# Patient Record
Sex: FEMALE | Race: WHITE | NOT HISPANIC OR LATINO | Employment: FULL TIME | URBAN - METROPOLITAN AREA
[De-identification: names, ages, dates, MRNs, and addresses within clinical notes are randomized per-mention and may not be internally consistent; named-entity substitution may affect disease eponyms.]

---

## 2017-02-15 ENCOUNTER — TELEPHONE (OUTPATIENT)
Dept: OBSTETRICS AND GYNECOLOGY | Facility: CLINIC | Age: 22
End: 2017-02-15

## 2017-02-15 NOTE — TELEPHONE ENCOUNTER
----- Message from Alycia Cervantes sent at 2/15/2017 10:04 AM CST -----  Patient states she would liked to be worked in for an appt on today for pregnancy testing. Patient was advised of availability. Please adv/call [phone].//thanks. cw

## 2017-02-21 ENCOUNTER — TELEPHONE (OUTPATIENT)
Dept: OBSTETRICS AND GYNECOLOGY | Facility: CLINIC | Age: 22
End: 2017-02-21

## 2017-02-21 NOTE — TELEPHONE ENCOUNTER
----- Message from Mason Cervantes sent at 2/21/2017  1:51 PM CST -----  Contact: Pt   Pt is returning a missed call./ States she needs to know can she have her an ultrasound when she comes in for her office visit./ She can be reached at [phone]

## 2017-02-21 NOTE — TELEPHONE ENCOUNTER
----- Message from Ragini Naranjo sent at 2/21/2017  1:03 PM CST -----  Contact: Patient  Patient is asking if she is going to have an ultrasound done on her first appointment, please call her back at [phone]. Thank you

## 2017-02-21 NOTE — TELEPHONE ENCOUNTER
Spoke with patient, she is scheduled for a Risk Assessment.  Patient informed that the sonogram is usually done at the Initial OB appointment.  Patient voices understanding.

## 2017-03-07 ENCOUNTER — OFFICE VISIT (OUTPATIENT)
Dept: OBSTETRICS AND GYNECOLOGY | Facility: CLINIC | Age: 22
End: 2017-03-07
Payer: MEDICAID

## 2017-03-07 ENCOUNTER — PROCEDURE VISIT (OUTPATIENT)
Dept: OBSTETRICS AND GYNECOLOGY | Facility: CLINIC | Age: 22
End: 2017-03-07
Payer: MEDICAID

## 2017-03-07 VITALS
BODY MASS INDEX: 24.06 KG/M2 | HEIGHT: 63 IN | SYSTOLIC BLOOD PRESSURE: 121 MMHG | DIASTOLIC BLOOD PRESSURE: 70 MMHG | WEIGHT: 135.81 LBS

## 2017-03-07 DIAGNOSIS — Z32.01 PREGNANCY EXAMINATION OR TEST, POSITIVE RESULT: Primary | ICD-10-CM

## 2017-03-07 DIAGNOSIS — Z32.01 PREGNANCY EXAMINATION OR TEST, POSITIVE RESULT: ICD-10-CM

## 2017-03-07 PROCEDURE — 76801 OB US < 14 WKS SINGLE FETUS: CPT | Mod: PBBFAC | Performed by: OBSTETRICS & GYNECOLOGY

## 2017-03-07 PROCEDURE — 76801 OB US < 14 WKS SINGLE FETUS: CPT | Mod: 26,S$PBB,, | Performed by: OBSTETRICS & GYNECOLOGY

## 2017-03-07 PROCEDURE — 99213 OFFICE O/P EST LOW 20 MIN: CPT | Mod: S$PBB,,, | Performed by: OBSTETRICS & GYNECOLOGY

## 2017-03-07 PROCEDURE — 99999 PR PBB SHADOW E&M-EST. PATIENT-LVL III: CPT | Mod: PBBFAC,,, | Performed by: OBSTETRICS & GYNECOLOGY

## 2017-03-07 NOTE — MR AVS SNAPSHOT
"    [facility]  [address] [facility] [address]  [place] [address]  Phone: [phone]  Fax: [phone]                  April Pearl Khoury   3/7/2017 5:15 PM   Office Visit    Description:  Female : 1995   Provider:  ROSHAN Whalen MD   Department:  [facility]           Reason for Visit     Possible Pregnancy           Diagnoses this Visit        Comments    Pregnancy examination or test, positive result    -  Primary            To Do List           Future Appointments        Provider Department Dept Phone    3/28/2017 1:00 PM Danielle Arguello CNM [facility]/ -[phone]      Goals (5 Years of Data)     None      [facility]     [facility] Line -  Assistance  Registered nurses in the [facility] On Call Center provide clinical advisement, health education, appointment booking, and other advisory services.  Call for this free service at [phone].             Medications           Message regarding Medications     Verify the changes and/or additions to your medication regime listed below are the same as discussed with your clinician today.  If any of these changes or additions are incorrect, please notify your healthcare provider.             Verify that the below list of medications is an accurate representation of the medications you are currently taking.  If none reported, the list may be blank. If incorrect, please contact your healthcare provider. Carry this list with you in case of emergency.           Current Medications     fluticasone (FLONASE) 50 mcg/actuation nasal spray 2 sprays by Each Nare route 2 (two) times daily as needed for Rhinitis.           Clinical Reference Information           Your Vitals Were     BP Height Weight Last Period BMI    121/70 5' 3" (1.6 m) 61.6 kg (135 lb 12.9 oz) 2017 24.06 kg/m2      Blood Pressure          Most Recent Value    BP  121/70      Allergies as of 3/7/2017     No Known Allergies      Immunizations " Administered on Date of Encounter - 3/7/2017     None      Orders Placed During Today's Visit     Future Labs/Procedures Expected by Expires    US OB/GYN Procedure ([facility])  As directed 3/7/2018      Smoking Cessation     If you would like to quit smoking:   You may be eligible for free services if you are a [place] resident and started smoking cigarettes before September 1, 1988.  Call the Smoking Cessation Trust ([facility]) toll free at (240) 053-9057 or (623) 926-3768.   Call 1-800-QUIT-NOW if you do not meet the above criteria.            Language Assistance Services     ATTENTION: Language assistance services are available, free of charge. Please call [phone].      ATENCIÓN: Si habla español, tiene a garcía disposición servicios gratuitos de asistencia lingüística. Llame al [phone].     CHÚ Ý: N?u b?n nói Ti?ng Vi?t, có các d?ch v? h? tr? ngôn ng? mi?n phí dành cho b?n. G?i s? [phone].         [facility] complies with applicable Federal civil rights laws and does not discriminate on the basis of race, color, national origin, age, disability, or sex.

## 2017-03-08 NOTE — PROGRESS NOTES
Subjective:       Patient ID: April Pearl Khoury is a 21 y.o. female.    Chief Complaint:  Possible Pregnancy      History of Present Illness  HPI  Presents today for risk assessment for possible pregnancy.  UPT is positive in office.   Medical history reviewed with patient.   Risk evaluation discussed with patient  Reviewed plan of care and [facility] practice style.   Low risk  Followed by CNM with last pregnancy     GYN & OB History  Patient's last menstrual period was 2017.   Date of Last Pap: 2015    OB History    Para Term  AB SAB TAB Ectopic Multiple Living   1 1 1       1      # Outcome Date GA Lbr Naseem/2nd Weight Sex Delivery Anes PTL Lv   1 Term 14 40w0d  3.43 kg (7 lb 9 oz) M Vag-Spont   Y          Review of Systems  Review of Systems   Constitutional: Negative for appetite change, chills, fatigue, fever and unexpected weight change.   HENT: Negative.    Eyes: Negative for visual disturbance.   Respiratory: Negative for shortness of breath and wheezing.    Cardiovascular: Negative for chest pain, palpitations and leg swelling.   Gastrointestinal: Negative for abdominal pain, bloating, blood in stool, constipation, diarrhea, nausea and vomiting.   Endocrine: Negative for hair loss, hot flashes and hypothyroidism.   Genitourinary: Negative for dyspareunia, dysuria, flank pain, frequency, genital sores, hematuria, pelvic pain, urgency, vaginal bleeding, vaginal discharge, dysmenorrhea, urinary incontinence and vaginal odor.   Musculoskeletal: Negative for back pain, joint swelling and myalgias.   Skin:  Negative for rash and hair changes.   Neurological: Negative for syncope and headaches.   Hematological: Negative for adenopathy. Does not bruise/bleed easily.   Psychiatric/Behavioral: Negative for depression and sleep disturbance. The patient is not nervous/anxious.    Breast: Negative for breast mass, breast pain, nipple discharge and skin changes         Ultrasound:  IUP 7w 3 d with positive FHR, bobby.     Objective:    Physical Exam       Assessment:        1. Pregnancy examination or test, positive result                Plan:      New Ob appt in 3 weeks

## 2017-03-28 ENCOUNTER — INITIAL PRENATAL (OUTPATIENT)
Dept: OBSTETRICS AND GYNECOLOGY | Facility: CLINIC | Age: 22
End: 2017-03-28
Payer: MEDICAID

## 2017-03-28 ENCOUNTER — LAB VISIT (OUTPATIENT)
Dept: LAB | Facility: HOSPITAL | Age: 22
End: 2017-03-28
Attending: ADVANCED PRACTICE MIDWIFE
Payer: MEDICAID

## 2017-03-28 VITALS
WEIGHT: 137.56 LBS | BODY MASS INDEX: 24.37 KG/M2 | SYSTOLIC BLOOD PRESSURE: 106 MMHG | DIASTOLIC BLOOD PRESSURE: 72 MMHG

## 2017-03-28 DIAGNOSIS — Z34.80 SUPERVISION OF OTHER NORMAL PREGNANCY: Primary | ICD-10-CM

## 2017-03-28 DIAGNOSIS — Z34.80 SUPERVISION OF OTHER NORMAL PREGNANCY: ICD-10-CM

## 2017-03-28 LAB
BASOPHILS # BLD AUTO: 0.02 K/UL
BASOPHILS NFR BLD: 0.2 %
DIFFERENTIAL METHOD: ABNORMAL
EOSINOPHIL # BLD AUTO: 0.1 K/UL
EOSINOPHIL NFR BLD: 0.6 %
ERYTHROCYTE [DISTWIDTH] IN BLOOD BY AUTOMATED COUNT: 12.9 %
HCT VFR BLD AUTO: 34.9 %
HGB BLD-MCNC: 12 G/DL
LYMPHOCYTES # BLD AUTO: 2.1 K/UL
LYMPHOCYTES NFR BLD: 24.2 %
MCH RBC QN AUTO: 30.1 PG
MCHC RBC AUTO-ENTMCNC: 34.4 %
MCV RBC AUTO: 88 FL
MONOCYTES # BLD AUTO: 0.7 K/UL
MONOCYTES NFR BLD: 7.9 %
NEUTROPHILS # BLD AUTO: 5.7 K/UL
NEUTROPHILS NFR BLD: 66.9 %
PLATELET # BLD AUTO: 262 K/UL
PMV BLD AUTO: 10.3 FL
RBC # BLD AUTO: 3.99 M/UL
WBC # BLD AUTO: 8.46 K/UL

## 2017-03-28 PROCEDURE — 99999 PR PBB SHADOW E&M-EST. PATIENT-LVL II: CPT | Mod: PBBFAC,,, | Performed by: ADVANCED PRACTICE MIDWIFE

## 2017-03-28 PROCEDURE — 36415 COLL VENOUS BLD VENIPUNCTURE: CPT

## 2017-03-28 PROCEDURE — 86703 HIV-1/HIV-2 1 RESULT ANTBDY: CPT

## 2017-03-28 PROCEDURE — 86592 SYPHILIS TEST NON-TREP QUAL: CPT

## 2017-03-28 PROCEDURE — 85025 COMPLETE CBC W/AUTO DIFF WBC: CPT

## 2017-03-28 PROCEDURE — 86901 BLOOD TYPING SEROLOGIC RH(D): CPT

## 2017-03-28 PROCEDURE — 99203 OFFICE O/P NEW LOW 30 MIN: CPT | Mod: TH,S$PBB,, | Performed by: ADVANCED PRACTICE MIDWIFE

## 2017-03-28 PROCEDURE — 87340 HEPATITIS B SURFACE AG IA: CPT

## 2017-03-28 PROCEDURE — 86762 RUBELLA ANTIBODY: CPT

## 2017-03-28 PROCEDURE — 86900 BLOOD TYPING SEROLOGIC ABO: CPT

## 2017-03-28 NOTE — MR AVS SNAPSHOT
[facility]  [address] [facility]  [place] [address]  Phone: [phone]  Fax: [phone]                  April Pearl Khoury   3/28/2017 1:00 PM   Initial Prenatal    Description:  Female : 1995   Provider:  Danielle Arguello CNM   Department:  [facility]           Reason for Visit     Initial Prenatal Visit           Diagnoses this Visit        Comments    Supervision of other normal pregnancy    -  Primary            To Do List           Goals (5 Years of Data)     None      [facility]     [facility] Call Nurse Care Line -  Assistance  Registered nurses in the [facility] On Call Center provide clinical advisement, health education, appointment booking, and other advisory services.  Call for this free service at [phone].             Medications           Message regarding Medications     Verify the changes and/or additions to your medication regime listed below are the same as discussed with your clinician today.  If any of these changes or additions are incorrect, please notify your healthcare provider.             Verify that the below list of medications is an accurate representation of the medications you are currently taking.  If none reported, the list may be blank. If incorrect, please contact your healthcare provider. Carry this list with you in case of emergency.           Current Medications     fluticasone (FLONASE) 50 mcg/actuation nasal spray 2 sprays by Each Nare route 2 (two) times daily as needed for Rhinitis.           Clinical Reference Information           Prenatal Vitals     Enc. Date GA Prenatal Vitals Prenatal Pulse Pain Level Urine Albumin/Glucose Edema Presentation Dilation/Effacement/Station    3/28/17 10w3d 106/72 / 62.4 kg (137 lb 9.1 oz)  / 162 / Absent  0          Your Vitals Were     BP Weight Last Period BMI       106/72 62.4 kg (137 lb 9.1 oz) 2017 24.37 kg/m2       Allergies as of 3/28/2017     No Known Allergies       Immunizations Administered on Date of Encounter - 3/28/2017     None      Orders Placed During Today's Visit      Normal Orders This Visit    C. trachomatis/N. gonorrhoeae by AMP DNA Vagina     Future Labs/Procedures Expected by Expires    CBC auto differential  3/28/2017 3/28/2018    Hepatitis B surface antigen  3/28/2017 3/28/2018    HIV-1 and HIV-2 antibodies  3/28/2017 3/28/2018    RPR  3/28/2017 3/28/2018    Rubella antibody, IgG  3/28/2017 3/28/2018    Type & Screen - Ob Profile  3/28/2017 3/28/2018      Smoking Cessation     If you would like to quit smoking:   You may be eligible for free services if you are a [place] resident and started smoking cigarettes before September 1, 1988.  Call the Smoking Cessation Trust ([employer]) toll free at (074) 068-4612 or (520) 086-4069.   Call 7-795-QUIT-NOW if you do not meet the above criteria.            Language Assistance Services     ATTENTION: Language assistance services are available, free of charge. Please call [phone].      ATENCIÓN: Si habla español, tiene a garcía disposición servicios gratuitos de asistencia lingüística. Llame al [phone].     CHÚ Ý: N?u b?n nói Ti?ng Vi?t, có các d?ch v? h? tr? ngôn ng? mi?n phí dành cho b?n. G?i s? [phone].         [facility] complies with applicable Federal civil rights laws and does not discriminate on the basis of race, color, national origin, age, disability, or sex.

## 2017-03-28 NOTE — PROGRESS NOTES
Familiar with our practice, delivered last child here. Reoriented, blue bag info reviewed. No [place] books in office, will provide next OV. Consent signed. No c/o nausea. Discussed zika and dietary recommendations. Labs today, culture sent, wet prep + BV exp will treat after 14 wks. al

## 2017-03-29 LAB
ABO + RH BLD: NORMAL
BLD GP AB SCN CELLS X3 SERPL QL: NORMAL
C TRACH DNA SPEC QL NAA+PROBE: NOT DETECTED
HBV SURFACE AG SERPL QL IA: NEGATIVE
HIV 1+2 AB+HIV1 P24 AG SERPL QL IA: NEGATIVE
N GONORRHOEA DNA SPEC QL NAA+PROBE: NOT DETECTED
RPR SER QL: NORMAL
RUBV IGG SER-ACNC: 30.4 IU/ML
RUBV IGG SER-IMP: REACTIVE

## 2017-04-05 ENCOUNTER — TELEPHONE (OUTPATIENT)
Dept: OBSTETRICS AND GYNECOLOGY | Facility: CLINIC | Age: 22
End: 2017-04-05

## 2017-04-05 ENCOUNTER — HOSPITAL ENCOUNTER (EMERGENCY)
Facility: HOSPITAL | Age: 22
Discharge: HOME | End: 2017-04-05
Payer: MEDICAID

## 2017-04-05 VITALS
HEART RATE: 101 BPM | BODY MASS INDEX: 24.1 KG/M2 | SYSTOLIC BLOOD PRESSURE: 143 MMHG | DIASTOLIC BLOOD PRESSURE: 66 MMHG | RESPIRATION RATE: 16 BRPM | OXYGEN SATURATION: 97 % | TEMPERATURE: 98 F | WEIGHT: 136 LBS | HEIGHT: 63 IN

## 2017-04-05 DIAGNOSIS — N39.0 URINARY TRACT INFECTION WITHOUT HEMATURIA, SITE UNSPECIFIED: ICD-10-CM

## 2017-04-05 DIAGNOSIS — O21.9 NAUSEA/VOMITING IN PREGNANCY: Primary | ICD-10-CM

## 2017-04-05 DIAGNOSIS — R03.0 ELEVATED BLOOD PRESSURE READING: ICD-10-CM

## 2017-04-05 DIAGNOSIS — O26.891 HEADACHE IN PREGNANCY, FIRST TRIMESTER: ICD-10-CM

## 2017-04-05 DIAGNOSIS — O12.11 GESTATIONAL PROTEINURIA IN FIRST TRIMESTER: ICD-10-CM

## 2017-04-05 DIAGNOSIS — R51.9 HEADACHE IN PREGNANCY, FIRST TRIMESTER: ICD-10-CM

## 2017-04-05 LAB
BACTERIA #/AREA URNS HPF: ABNORMAL /HPF
BILIRUB UR QL STRIP: NEGATIVE
CLARITY UR: CLEAR
COLOR UR: YELLOW
GLUCOSE UR QL STRIP: NEGATIVE
HGB UR QL STRIP: ABNORMAL
HYALINE CASTS #/AREA URNS LPF: 0 /LPF
KETONES UR QL STRIP: NEGATIVE
LEUKOCYTE ESTERASE UR QL STRIP: ABNORMAL
MICROSCOPIC COMMENT: ABNORMAL
NITRITE UR QL STRIP: POSITIVE
PH UR STRIP: 7 [PH] (ref 5–8)
PROT UR QL STRIP: ABNORMAL
RBC #/AREA URNS HPF: 5 /HPF (ref 0–4)
SP GR UR STRIP: 1.02 (ref 1–1.03)
URN SPEC COLLECT METH UR: ABNORMAL
UROBILINOGEN UR STRIP-ACNC: 1 EU/DL
WBC #/AREA URNS HPF: 50 /HPF (ref 0–5)
WBC CLUMPS URNS QL MICRO: ABNORMAL

## 2017-04-05 PROCEDURE — 81000 URINALYSIS NONAUTO W/SCOPE: CPT

## 2017-04-05 PROCEDURE — 25000003 PHARM REV CODE 250: Performed by: PHYSICIAN ASSISTANT

## 2017-04-05 PROCEDURE — 99284 EMERGENCY DEPT VISIT MOD MDM: CPT

## 2017-04-05 RX ORDER — PROMETHAZINE HYDROCHLORIDE 25 MG/1
12.5 TABLET ORAL EVERY 6 HOURS PRN
Qty: 15 TABLET | Refills: 0 | Status: SHIPPED | OUTPATIENT
Start: 2017-04-05 | End: 2019-02-21

## 2017-04-05 RX ORDER — ACETAMINOPHEN 500 MG
1000 TABLET ORAL
Status: COMPLETED | OUTPATIENT
Start: 2017-04-05 | End: 2017-04-05

## 2017-04-05 RX ORDER — NITROFURANTOIN 25; 75 MG/1; MG/1
100 CAPSULE ORAL 2 TIMES DAILY
Qty: 14 CAPSULE | Refills: 0 | Status: SHIPPED | OUTPATIENT
Start: 2017-04-05 | End: 2017-04-09

## 2017-04-05 RX ORDER — NITROFURANTOIN 25; 75 MG/1; MG/1
100 CAPSULE ORAL
Status: COMPLETED | OUTPATIENT
Start: 2017-04-05 | End: 2017-04-05

## 2017-04-05 RX ADMIN — ACETAMINOPHEN 1000 MG: 500 TABLET ORAL at 03:04

## 2017-04-05 RX ADMIN — NITROFURANTOIN MONOHYDRATE/MACROCRYSTALLINE 100 MG: 25; 75 CAPSULE ORAL at 04:04

## 2017-04-05 NOTE — TELEPHONE ENCOUNTER
----- Message from Malika Fernandez sent at 4/5/2017  3:31 PM CDT -----  Contact: pt   Pt called this morning and spoke with nurse and explained hwo she was feeling and was told to go to ER because it could be dehydration,, pt is at ER now and they are telling her that there is nothing wrong with her because she can uriinate and drink,, and really want to know what to tell them to test her,,, please call pt back at [phone]

## 2017-04-05 NOTE — DISCHARGE INSTRUCTIONS
"  Self-Care for Headaches  Most headaches aren't serious and can be relieved with self-care. But some headaches may be a sign of another health problem like eye trouble or high blood pressure. To find the best treatment, learn what kind of headaches you get. For tension headaches, self-care will usually help. To treat migraines, ask your healthcare provider for advice. It is also possible to get both tension and migraine headaches. Self-care involves relieving the pain and avoiding headache triggers if you can.    Ways to reduce pain and tension  Try these steps:  · Apply a cold compress or ice pack to the pain site.  · Drink fluids. If nausea makes it hard to drink, try sucking on ice.  · Rest. Protect yourself from bright light and loud noises.  · Calm your emotions by imagining a peaceful scene.  · Massage tight neck, shoulder, and head muscles.  · To relax muscles, soak in a hot bath or use a hot shower.  Use medicines  Aspirin or aspirin substitutes, such as ibuprofen and acetaminophen, can relieve headache. Remember: Never give aspirin to anyone 18 years old or younger because of the risk of developing Reye syndrome. Use pain medicines only when necessary.  Track your headaches  Keeping a headache diary can help you and your healthcare provider identify what's causing your headaches:  · Note when each headache happens.  · Identify your activities and the foods you've eaten 6 to 8 hours before the headache began.  · Look for any trends or "triggers."  Signs of tension headache  Any of the following can be signs:  · Dull pain or feeling of pressure in a tight band around your head  · Pain in your neck or shoulders  · Headache without a definite beginning or end  · Headache after an activity such as driving or working on a computer  Signs of migraine  Any of the following can be signs:  · Throbbing pain on one or both sides of your head  · Nausea or vomiting  · Extreme sensitivity to light, sound, and " "smells  · Bright spots, flashes, or other visual changes  · Pain or nausea so severe that you can't continue your daily activities  Call your healthcare provider   If you have any of the following symptoms, contact your healthcare provider:  · A headache that lingers after a recent injury or bump to the head.  · A fever with a stiff neck or pain when you bend your head toward your chest.  · A headache along with slurred speech, changes in your vision, or numbness or weakness in your arms or legs.  · A headache for longer than 3 days.  · Frequent headaches, especially in the morning.  · Headaches with seizures   · Seek immediate medical attention if you have a headache that you would call "the worst headache you have ever had."   Date Last Reviewed: 10/4/2015  © 5391-9593 [employer]. [address] [place] [address], [place] [address]. All rights reserved. This information is not intended as a substitute for professional medical care. Always follow your healthcare professional's instructions.          How to Control Nausea and Vomiting     Taken before meals, medicines can help ease nausea.    Nausea is feeling that you need to throw up. Throwing up occurs when your body forces food that is in your stomach out through your mouth. Nausea and vomiting are symptoms that are caused by many things. They can happen when a condition or disease, medicine, medical treatment, or a poisonous substance affects the area in your brain that controls vomiting. Some conditions or diseases can cause nausea, abdominal pain or cramps, and vomiting. The symptoms can be mild and go away by themselves. Other symptoms can be serious. You will need to see your healthcare provider for these.  Nausea and vomiting are common. They can be caused by many things. These include:  · "Stomach flu" (gastroenteritis)  · Food poisoning  · Stomach pain (gastritis)  · Blockages  They can also be caused by a head injury, an infection in the brain or " inside the ear, or migraines. Other common causes of nausea and vomiting include:  · Brain tumor  · Brain bruise  · Motion sickness  · Drugs. These include alcohol, pain medicines such as morphine, and cancer medicines.  · Toxins. These are poisonous things like plants or liquids that are swallowed by accident.  · Advanced types of cancer  · Movement problems (psychogenic problems)  · Extra pressure in the fluid that surrounds the brain and spinal cord (elevated intracranial pressure)     Nausea and vomiting are also common side effects of chemotherapy and radiation therapy. Side effects happen when treatment changes some normal cells as well as cancer cells. In this case, the cells lining your stomach and the part of your brain that controls vomiting are affected. Other more serious causes of vomiting may be hard to find early in the illness.     When to seek medical advice  Call your healthcare provider right away if you have the following:  · Nausea or vomiting that lasts 24 hours or more  · Trouble keeping fluids down   Medicines can help  Nausea or vomiting can often be prevented or controlled with medicines (antiemetics). Your doctor may give you antiemetics before or after treatment if you are getting chemotherapy or other medical treatments that cause nausea or vomiting.  Eating tips  · If you have medicines to control nausea, take them before meals as directed.  · Avoid fatty or greasy foods while nauseated.  · Eat small meals slowly throughout the day.  · Ask someone to sit with you while you eat to keep you from thinking about feeling nauseated.  · Eat foods at room temperature or colder to avoid strong smells.  · Eat dry foods, such as toast, crackers, or pretzels. Also eat cool, light foods, such as applesauce, and bland foods, such as oatmeal or skinned chicken.   Other ways to feel better  · Get a little fresh air. Take a short walk.  · Talk to a friend, listen to music, or watch TV.  · Take a few  deep, slow breaths.  · Eat by candlelight or in surroundings that you find relaxing.  · Use a technique, such as guided imagery, to help you relax. Imagine yourself in a beautiful, restful scene. Or daydream about the place youd most like to be.  Date Last Reviewed: 1/6/2016 © 2000-2016 [employer]. [address] [place] [address], [place] [address]. All rights reserved. This information is not intended as a substitute for professional medical care. Always follow your healthcare professional's instructions.          Protein in the Urine (Proteinuria)  The kidney filters waste products and unwanted substances from the blood. These waste products end up in the urine. Protein is an important part of the blood and is not filtered out. Normally, there is no protein in the urine.  Proteinuria occurs when some of the normal protein in the bloodstream ends up in the urine. Protein in the urine will show up on a standard urine test.   Protein in the urine can be a sign of serious disease or a harmless temporary condition. For example, heavy exercise or fever can cause temporary proteinuria. This is normal and will go away if the urine test is repeated.  If urine tests continue to show protein in the urine, chronic kidney disease may be present. Common causes of kidney disease include diabetes, high blood pressure, and conditions that cause inflammation in the kidneys.  Protein in the blood helps keep fluids from leaking out of the blood vessels and into the surrounding tissues. Mild or temporary proteinuria doesn't cause any symptoms. However, if too much protein is lost from the body, there may be swelling as fluid leaks from the blood vessels. Swelling may appear in legs, feet, and ankles or elsewhere such as lower back, face and eyelids.  If proteinuria persists on repeat urine testing, more tests will be needed to figure out the cause. If the cause is still unclear, you may be told to see a kidney  specialist.  Home care  No special home care is needed until the cause of your proteinuria is known.  Follow-up care  Follow up with your doctor, or as advised.  Call 911   Call 911 or get immediate medical care if any of the following occur:   · Severe weakness, dizziness, fainting, drowsiness, or confusion  · Chest pain or shortness of breath   When to seek medical advice  Call your healthcare provider right away if any of these occur:   · Nausea or vomiting  · Unexpected weight gain or swelling in the legs, ankles, or around the eyes  · Dark colored urine  · Decreased or absent urine output  Date Last Reviewed: 6/1/2016 © 2000-2016 [employer]. [address] [place] [address], [place] [address]. All rights reserved. This information is not intended as a substitute for professional medical care. Always follow your healthcare professional's instructions.          Urinary Tract Infections in Women    Urinary tract infections (UTIs) are most often caused by bacteria (germs). These bacteria enter the urinary tract. The bacteria may come from outside the body. Or they may travel from the skin outside the rectum or vagina into the urethra. Female anatomy makes it easier for bacteria from the bowel to enter a womans urinary tract, which is the most common source of UTI. This means women develop UTIs more often than men. Pain in or around the urinary tract is a common UTI symptom. But the only way to know for sure if you have a UTI for the health care provider to test your urine. The two tests that may be done are the urinalysis and urine culture.  Types of UTIs  · Cystitis: A bladder infection (cystitis) is the most common UTI in women. You may have urgent or frequent urination. You may also have pain, burning when you urinate, and bloody urine.  · Urethritis: This is an inflamed urethra, which is the tube that carries urine from the bladder to outside the body. You may have lower stomach or back pain. You may  also have urgent or frequent urination.  · Pyelonephritis: This is a kidney infection. If not treated, it can be serious and damage your kidneys. In severe cases, you may be hospitalized. You may have a fever and lower back pain.  Medications to treat a UTI  Most UTIs are treated with antibiotics. These kill the bacteria. The length of time you need to take them depends on the type of infection. It may be as short as 3 days. If you have repeated UTIs, a low-dose antibiotic may be needed for several months. Take antibiotics exactly as directed. Dont stop taking them until all of the medication is gone. If you stop taking the antibiotic too soon, the infection may not go away, and you may develop a resistance to the antibiotic. This can make it much harder to treat.  Lifestyle changes to treat and prevent UTIs  The lifestyle changes below will help get rid of your UTI. They may also help prevent future UTIs.  · Drink plenty of fluids. This includes water, juice, or other caffeine-free drinks. Fluids help flush bacteria out of your body.  · Empty your bladder. Always empty your bladder when you feel the urge to urinate. And always urinate before going to sleep. Urine that stays in your bladder can lead to infection. Try to urinate before and after sex as well.  · Practice good personal hygiene. Wipe yourself from front to back after using the toilet. This helps keep bacteria from getting into the urethra.  · Use condoms during sex. These help prevent UTIs caused by sexually transmitted bacteria. Also, avoid using spermicides during sex. These can increase the risk of UTIs. Choose other forms of birth control instead. For women who tend to get UTIs after sex, a low-dose of a preventive antibiotic may be used. Be sure to discuss this option with your health care provider.  · Follow up with your health care provider as directed. He or she may test to make sure the infection has cleared. If necessary, additional treatment  may be started.  Date Last Reviewed: 9/8/2014  © 6461-1551 [employer]. [address] [place] [address], [place] [address]. All rights reserved. This information is not intended as a substitute for professional medical care. Always follow your healthcare professional's instructions.

## 2017-04-05 NOTE — TELEPHONE ENCOUNTER
Spoke with patient, she is currently being treated @ [facility] ED.  Patient expressed concerns that her complaints were not being addressed.  She was advised to follow the orders of the ED physician, and that we would not do anything different in the hospital.  Tylenol 1gram has been advised Q6-8 hours prn for headaches.

## 2017-04-05 NOTE — ED AVS SNAPSHOT
[facility]  [id number] [place] [id number]               April Pearl Khoury   2017  2:38 PM   ED    Description:  Female : 1995   Department:  [facility]           Your Care was Coordinated By:     Provider Role From To    Daniel Blanchard PA-C Physician Assistant 17 [phone] --      Reason for Visit     Emesis During Pregnancy           Diagnoses this Visit        Comments    Nausea/vomiting in pregnancy    -  Primary     Headache in pregnancy, first trimester         Elevated blood pressure reading         Gestational proteinuria in first trimester         Urinary tract infection without hematuria, site unspecified           ED Disposition     ED Disposition Condition Comment    Discharge             To Do List           Follow-up Information     Schedule an appointment as soon as possible for a visit with [facility] - OB/ [facility]YN.    Specialty:  Obstetrics and Gynecology    Why:  Follow up with [facility] in the next 2-3 days for re-evaluation and further management.     Contact information:    [address] [facility] [address] [phone]    Additional information:    (off [facility]) 4th floor, Please check in for your appointment in the Women's Services Department located through the doorway to the left of the elevators.        Follow up with [facility].    Specialty:  Emergency Medicine    Why:  If symptoms worsen in any way.     Contact information:    [address] [place] [id number] [phone]       These Medications        Disp Refills Start End    nitrofurantoin, macrocrystal-monohydrate, (MACROBID) 100 MG capsule 14 capsule 0 2017    Take 1 capsule (100 mg total) by mouth 2 (two) times daily. - Oral    promethazine (PHENERGAN) 25 MG tablet 15 tablet 0 2017     Take 0.5 tablets (12.5 mg total) by mouth every 6 (six) hours as  needed for Nausea. - Oral      [facility]     [facility] On Call Nurse Care Line - 24/7 Assistance  Unless otherwise directed by your provider, please contact [facility], our nurse care line that is available for 24/7 assistance.     Registered nurses in the [facility] On Call Center provide: appointment scheduling, clinical advisement, health education, and other advisory services.  Call: [phone] (toll free)               Medications           Message regarding Medications     Verify the changes and/or additions to your medication regime listed below are the same as discussed with your clinician today.  If any of these changes or additions are incorrect, please notify your healthcare provider.        START taking these NEW medications        Refills    nitrofurantoin, macrocrystal-monohydrate, (MACROBID) 100 MG capsule 0    Sig: Take 1 capsule (100 mg total) by mouth 2 (two) times daily.    Class: Print    Route: Oral    promethazine (PHENERGAN) 25 MG tablet 0    Sig: Take 0.5 tablets (12.5 mg total) by mouth every 6 (six) hours as needed for Nausea.    Class: Print    Route: Oral      These medications were administered today        Dose Freq    acetaminophen tablet 1,000 mg 1,000 mg ED 1 Time    Sig: Take 2 tablets (1,000 mg total) by mouth ED 1 Time.    Class: Normal    Route: Oral    Cosign for Ordering: Accepted by Lis Mcgraw MD on 4/5/2017  3:43 PM    nitrofurantoin (macrocrystal-monohydrate) 100 MG capsule 100 mg 100 mg ED 1 Time    Sig: Take 1 capsule (100 mg total) by mouth ED 1 Time.    Class: Normal    Route: Oral    Cosign for Ordering: Accepted by Lis Mcgraw MD on 4/5/2017  4:11 PM      STOP taking these medications     fluticasone (FLONASE) 50 mcg/actuation nasal spray 2 sprays by Each Nare route 2 (two) times daily as needed for Rhinitis.           Verify that the below list of medications is an accurate representation of the medications you are currently taking.  If  "none reported, the list may be blank. If incorrect, please contact your healthcare provider. Carry this list with you in case of emergency.           Current Medications     nitrofurantoin (macrocrystal-monohydrate) 100 MG capsule 100 mg Take 1 capsule (100 mg total) by mouth ED 1 Time.    nitrofurantoin, macrocrystal-monohydrate, (MACROBID) 100 MG capsule Take 1 capsule (100 mg total) by mouth 2 (two) times daily.    promethazine (PHENERGAN) 25 MG tablet Take 0.5 tablets (12.5 mg total) by mouth every 6 (six) hours as needed for Nausea.           Clinical Reference Information           Your Vitals Were     BP Pulse Temp Resp Height Weight    143/66 (BP Location: Right arm, Patient Position: Sitting) 101 98.4 °F (36.9 °C) 16 5' 3" (1.6 m) 61.7 kg (136 lb)    Last Period SpO2 BMI          01/14/2017 97% 24.09 kg/m2        Allergies as of 4/5/2017     No Known Allergies      Immunizations Administered on Date of Encounter - 4/5/2017     None      ED Micro, Lab, POCT     Start Ordered       Status Ordering Provider    04/05/17 1602 04/05/17 1601  Urine culture **CANNOT BE ORDERED STAT**  Once      Acknowledged     04/05/17 1448 04/05/17 1447  Urinalysis  STAT      Final result     04/05/17 1447 04/05/17 1447  Urinalysis Microscopic  Once      Final result       ED Imaging Orders     None        Discharge Instructions         Self-Care for Headaches  Most headaches aren't serious and can be relieved with self-care. But some headaches may be a sign of another health problem like eye trouble or high blood pressure. To find the best treatment, learn what kind of headaches you get. For tension headaches, self-care will usually help. To treat migraines, ask your healthcare provider for advice. It is also possible to get both tension and migraine headaches. Self-care involves relieving the pain and avoiding headache triggers if you can.    Ways to reduce pain and tension  Try these steps:  · Apply a cold compress or ice " "pack to the pain site.  · Drink fluids. If nausea makes it hard to drink, try sucking on ice.  · Rest. Protect yourself from bright light and loud noises.  · Calm your emotions by imagining a peaceful scene.  · Massage tight neck, shoulder, and head muscles.  · To relax muscles, soak in a hot bath or use a hot shower.  Use medicines  Aspirin or aspirin substitutes, such as ibuprofen and acetaminophen, can relieve headache. Remember: Never give aspirin to anyone 18 years old or younger because of the risk of developing Reye syndrome. Use pain medicines only when necessary.  Track your headaches  Keeping a headache diary can help you and your healthcare provider identify what's causing your headaches:  · Note when each headache happens.  · Identify your activities and the foods you've eaten 6 to 8 hours before the headache began.  · Look for any trends or "triggers."  Signs of tension headache  Any of the following can be signs:  · Dull pain or feeling of pressure in a tight band around your head  · Pain in your neck or shoulders  · Headache without a definite beginning or end  · Headache after an activity such as driving or working on a computer  Signs of migraine  Any of the following can be signs:  · Throbbing pain on one or both sides of your head  · Nausea or vomiting  · Extreme sensitivity to light, sound, and smells  · Bright spots, flashes, or other visual changes  · Pain or nausea so severe that you can't continue your daily activities  Call your healthcare provider   If you have any of the following symptoms, contact your healthcare provider:  · A headache that lingers after a recent injury or bump to the head.  · A fever with a stiff neck or pain when you bend your head toward your chest.  · A headache along with slurred speech, changes in your vision, or numbness or weakness in your arms or legs.  · A headache for longer than 3 days.  · Frequent headaches, especially in the morning.  · Headaches with " "seizures   · Seek immediate medical attention if you have a headache that you would call "the worst headache you have ever had."   Date Last Reviewed: 10/4/2015  © 7489-1918 [employer]. [address] [place] [address], [place] [address]. All rights reserved. This information is not intended as a substitute for professional medical care. Always follow your healthcare professional's instructions.          How to Control Nausea and Vomiting     Taken before meals, medicines can help ease nausea.    Nausea is feeling that you need to throw up. Throwing up occurs when your body forces food that is in your stomach out through your mouth. Nausea and vomiting are symptoms that are caused by many things. They can happen when a condition or disease, medicine, medical treatment, or a poisonous substance affects the area in your brain that controls vomiting. Some conditions or diseases can cause nausea, abdominal pain or cramps, and vomiting. The symptoms can be mild and go away by themselves. Other symptoms can be serious. You will need to see your healthcare provider for these.  Nausea and vomiting are common. They can be caused by many things. These include:  · "Stomach flu" (gastroenteritis)  · Food poisoning  · Stomach pain (gastritis)  · Blockages  They can also be caused by a head injury, an infection in the brain or inside the ear, or migraines. Other common causes of nausea and vomiting include:  · Brain tumor  · Brain bruise  · Motion sickness  · Drugs. These include alcohol, pain medicines such as morphine, and cancer medicines.  · Toxins. These are poisonous things like plants or liquids that are swallowed by accident.  · Advanced types of cancer  · Movement problems (psychogenic problems)  · Extra pressure in the fluid that surrounds the brain and spinal cord (elevated intracranial pressure)     Nausea and vomiting are also common side effects of chemotherapy and radiation therapy. Side effects happen when " treatment changes some normal cells as well as cancer cells. In this case, the cells lining your stomach and the part of your brain that controls vomiting are affected. Other more serious causes of vomiting may be hard to find early in the illness.     When to seek medical advice  Call your healthcare provider right away if you have the following:  · Nausea or vomiting that lasts 24 hours or more  · Trouble keeping fluids down   Medicines can help  Nausea or vomiting can often be prevented or controlled with medicines (antiemetics). Your doctor may give you antiemetics before or after treatment if you are getting chemotherapy or other medical treatments that cause nausea or vomiting.  Eating tips  · If you have medicines to control nausea, take them before meals as directed.  · Avoid fatty or greasy foods while nauseated.  · Eat small meals slowly throughout the day.  · Ask someone to sit with you while you eat to keep you from thinking about feeling nauseated.  · Eat foods at room temperature or colder to avoid strong smells.  · Eat dry foods, such as toast, crackers, or pretzels. Also eat cool, light foods, such as applesauce, and bland foods, such as oatmeal or skinned chicken.   Other ways to feel better  · Get a little fresh air. Take a short walk.  · Talk to a friend, listen to music, or watch TV.  · Take a few deep, slow breaths.  · Eat by candlelight or in surroundings that you find relaxing.  · Use a technique, such as guided imagery, to help you relax. Imagine yourself in a beautiful, restful scene. Or daydream about the place youd most like to be.  Date Last Reviewed: 1/6/2016  © 5096-8376 [employer]. [address] [place] [address]. All rights reserved. This information is not intended as a substitute for professional medical care. Always follow your healthcare professional's instructions.          Protein in the Urine (Proteinuria)  The kidney filters waste products and  unwanted substances from the blood. These waste products end up in the urine. Protein is an important part of the blood and is not filtered out. Normally, there is no protein in the urine.  Proteinuria occurs when some of the normal protein in the bloodstream ends up in the urine. Protein in the urine will show up on a standard urine test.   Protein in the urine can be a sign of serious disease or a harmless temporary condition. For example, heavy exercise or fever can cause temporary proteinuria. This is normal and will go away if the urine test is repeated.  If urine tests continue to show protein in the urine, chronic kidney disease may be present. Common causes of kidney disease include diabetes, high blood pressure, and conditions that cause inflammation in the kidneys.  Protein in the blood helps keep fluids from leaking out of the blood vessels and into the surrounding tissues. Mild or temporary proteinuria doesn't cause any symptoms. However, if too much protein is lost from the body, there may be swelling as fluid leaks from the blood vessels. Swelling may appear in legs, feet, and ankles or elsewhere such as lower back, face and eyelids.  If proteinuria persists on repeat urine testing, more tests will be needed to figure out the cause. If the cause is still unclear, you may be told to see a kidney specialist.  Home care  No special home care is needed until the cause of your proteinuria is known.  Follow-up care  Follow up with your doctor, or as advised.  Call 911   Call 911 or get immediate medical care if any of the following occur:   · Severe weakness, dizziness, fainting, drowsiness, or confusion  · Chest pain or shortness of breath   When to seek medical advice  Call your healthcare provider right away if any of these occur:   · Nausea or vomiting  · Unexpected weight gain or swelling in the legs, ankles, or around the eyes  · Dark colored urine  · Decreased or absent urine output  Date Last  Reviewed: 6/1/2016 © 2000-2016 [employer]. [address] [place] [address], [place] [address]. All rights reserved. This information is not intended as a substitute for professional medical care. Always follow your healthcare professional's instructions.          Urinary Tract Infections in Women    Urinary tract infections (UTIs) are most often caused by bacteria (germs). These bacteria enter the urinary tract. The bacteria may come from outside the body. Or they may travel from the skin outside the rectum or vagina into the urethra. Female anatomy makes it easier for bacteria from the bowel to enter a womans urinary tract, which is the most common source of UTI. This means women develop UTIs more often than men. Pain in or around the urinary tract is a common UTI symptom. But the only way to know for sure if you have a UTI for the health care provider to test your urine. The two tests that may be done are the urinalysis and urine culture.  Types of UTIs  · Cystitis: A bladder infection (cystitis) is the most common UTI in women. You may have urgent or frequent urination. You may also have pain, burning when you urinate, and bloody urine.  · Urethritis: This is an inflamed urethra, which is the tube that carries urine from the bladder to outside the body. You may have lower stomach or back pain. You may also have urgent or frequent urination.  · Pyelonephritis: This is a kidney infection. If not treated, it can be serious and damage your kidneys. In severe cases, you may be hospitalized. You may have a fever and lower back pain.  Medications to treat a UTI  Most UTIs are treated with antibiotics. These kill the bacteria. The length of time you need to take them depends on the type of infection. It may be as short as 3 days. If you have repeated UTIs, a low-dose antibiotic may be needed for several months. Take antibiotics exactly as directed. Dont stop taking them until all of the medication is gone. If  you stop taking the antibiotic too soon, the infection may not go away, and you may develop a resistance to the antibiotic. This can make it much harder to treat.  Lifestyle changes to treat and prevent UTIs  The lifestyle changes below will help get rid of your UTI. They may also help prevent future UTIs.  · Drink plenty of fluids. This includes water, juice, or other caffeine-free drinks. Fluids help flush bacteria out of your body.  · Empty your bladder. Always empty your bladder when you feel the urge to urinate. And always urinate before going to sleep. Urine that stays in your bladder can lead to infection. Try to urinate before and after sex as well.  · Practice good personal hygiene. Wipe yourself from front to back after using the toilet. This helps keep bacteria from getting into the urethra.  · Use condoms during sex. These help prevent UTIs caused by sexually transmitted bacteria. Also, avoid using spermicides during sex. These can increase the risk of UTIs. Choose other forms of birth control instead. For women who tend to get UTIs after sex, a low-dose of a preventive antibiotic may be used. Be sure to discuss this option with your health care provider.  · Follow up with your health care provider as directed. He or she may test to make sure the infection has cleared. If necessary, additional treatment may be started.  Date Last Reviewed: 9/8/2014 © 2000-2016 [employer]. [address] [place] [address]. All rights reserved. This information is not intended as a substitute for professional medical care. Always follow your healthcare professional's instructions.          Discharge References/Attachments     HYPERTENSION, TO BE CONFIRMED (ENGLISH)      Smoking Cessation     If you would like to quit smoking:   You may be eligible for free services if you are a [place] resident and started smoking cigarettes before September 1, 1988.  Call the Smoking Cessation Trust (SCT) toll  free at (102) 171-2901 or (491) 622-6859.   Call 1-800-QUIT-NOW if you do not meet the above criteria.   Contact us via email: [email or web address]   View our website for more information: [email or web address]         [facility] complies with applicable Federal civil rights laws and does not discriminate on the basis of race, color, national origin, age, disability, or sex.        Language Assistance Services     ATTENTION: Language assistance services are available, free of charge. Please call [phone].      ATENCIÓN: Si habla español, tiene a garcía disposición servicios gratuitos de asistencia lingüística. Llame al [phone].     CHÚ Ý: N?u b?n nói Ti?ng Vi?t, có các d?ch v? h? tr? ngôn ng? mi?n phí dành cho b?n. G?i s? [phone].

## 2017-04-05 NOTE — TELEPHONE ENCOUNTER
----- Message from Alycia Cervantes sent at 4/5/2017  1:30 PM CDT -----  States she is having some kidney pain and she have some other things going on and need to know if she needs to go the ER. Please adv/call [phone].//thanks. cw

## 2017-04-05 NOTE — ED PROVIDER NOTES
"Encounter Date: 2017       History     Chief Complaint   Patient presents with    Emesis During Pregnancy     headaches for past few weeks, vomiting yesterday, and bilateral kidney pain; pt is 11 wks pregnant     Review of patient's allergies indicates:  No Known Allergies  HPI Comments: The patient is a  who is currently 11 weeks pregnant. She has had pre- care, including an US showing a viable IUP during this gestation.     She presents to the ER with multiple complaints.     She states that she has episodes of headaches. She states that she is having 3-4 headache per week on average, for the past 3 weeks. She describes the headaches as gradual onset, frontal, bilateral, dull and throbbing. She she has tried Tylenol for the headaches, taking 325 mg at a time, but denies much relief. She denies hitting her head. She denies any related symptoms.     She also reports having episodes of nausea for the past week. She states that she vomited once yesterday, none today. She states that she is not prescribed anything for prn nausea. She states that the nausea is better so far today. She states that she just ate Can[employer] for lunch with no difficulty.     She is also c/o bilateral flank pain. She states "my kidneys hurt". She states that the flank pain is equal bilaterally, moderate in degree and waxing and waning in course. She denies any additional associated symptoms.           Past Medical History:   Diagnosis Date    ADD (attention deficit disorder)     OCD (obsessive compulsive disorder)     Vaginal laceration 2014     History reviewed. No pertinent surgical history.  Family History   Problem Relation Age of Onset    Diabetes Paternal Grandmother     Hypertension Paternal Grandmother     Diabetes Maternal Grandmother     Hypertension Maternal Grandmother      Social History   Substance Use Topics    Smoking status: Current Every Day Smoker     Packs/day: 0.25    Smokeless tobacco: Never Used "      Comment: 6-10 cigarettes    Alcohol use No     Review of Systems   Constitutional: Negative for activity change, chills, diaphoresis and fever.   HENT: Negative for congestion and sore throat.    Eyes: Negative for photophobia, pain, redness and visual disturbance.   Respiratory: Negative for cough, chest tightness and shortness of breath.    Cardiovascular: Negative for chest pain, palpitations and leg swelling.   Gastrointestinal: Positive for nausea and vomiting. Negative for abdominal pain, constipation and diarrhea.   Genitourinary: Positive for flank pain. Negative for decreased urine volume, difficulty urinating, dysuria, frequency, menstrual problem, pelvic pain, urgency, vaginal bleeding and vaginal discharge.   Musculoskeletal: Positive for back pain. Negative for arthralgias, gait problem and myalgias.   Skin: Negative for rash.   Neurological: Positive for headaches. Negative for dizziness, tremors, seizures, syncope, facial asymmetry, speech difficulty, weakness, light-headedness and numbness.   Psychiatric/Behavioral: Negative for confusion.       Physical Exam   Initial Vitals   BP Pulse Resp Temp SpO2   04/05/17 1350 04/05/17 1350 04/05/17 1350 04/05/17 1350 04/05/17 1350   143/66 101 16 98.4 °F (36.9 °C) 97 %     Physical Exam    Nursing note and vitals reviewed.  Constitutional: She appears well-developed and well-nourished. She is not diaphoretic.   Eating french fries and drinking Lemonade. NAD presently.    HENT:   Mouth/Throat: Oropharynx is clear and moist.   Eyes: Conjunctivae are normal. Pupils are equal, round, and reactive to light.   Neck: Normal range of motion. Neck supple.   Non-tender.    Cardiovascular: Normal rate and intact distal pulses.   Pulmonary/Chest: Breath sounds normal. No respiratory distress.   Abdominal: Soft. There is no tenderness. There is no guarding.   Genitourinary:   Genitourinary Comments: Declined.    Musculoskeletal: Normal range of motion. She exhibits  no edema.   Neurological: She is alert and oriented to person, place, and time. She has normal strength. No cranial nerve deficit or sensory deficit.   Normal speech. Normal gait. No focal deficit.    Skin: Skin is warm and dry. No rash noted.   Psychiatric: She has a normal mood and affect. Her behavior is normal.         ED Course   Procedures  Labs Reviewed   URINALYSIS - Abnormal; Notable for the following:        Result Value    Protein, UA 1+ (*)     Occult Blood UA Trace (*)     Nitrite, UA Positive (*)     Leukocytes, UA 3+ (*)     All other components within normal limits   URINALYSIS MICROSCOPIC - Abnormal; Notable for the following:     RBC, UA 5 (*)     WBC, UA 50 (*)     WBC Clumps, UA Few (*)     Bacteria, UA Many (*)     All other components within normal limits   CULTURE, URINE     Results for orders placed or performed during the hospital encounter of 17   Urinalysis   Result Value Ref Range    Specimen UA Urine, Clean Catch     Color, UA Yellow Yellow, Straw, Gavi    Appearance, UA Clear Clear    pH, UA 7.0 5.0 - 8.0    Specific Gravity, UA 1.025 1.005 - 1.030    Protein, UA 1+ (A) Negative    Glucose, UA Negative Negative    Ketones, UA Negative Negative    Bilirubin (UA) Negative Negative    Occult Blood UA Trace (A) Negative    Nitrite, UA Positive (A) Negative    Urobilinogen, UA 1.0 <2.0 EU/dL    Leukocytes, UA 3+ (A) Negative   Urinalysis Microscopic   Result Value Ref Range    RBC, UA 5 (H) 0 - 4 /hpf    WBC, UA 50 (H) 0 - 5 /hpf    WBC Clumps, UA Few (A) None-Rare    Bacteria, UA Many (A) None-Occ /hpf    Hyaline Casts, UA 0 0-1/lpf /lpf    Microscopic Comment SEE COMMENT                Medical Decision Making:   Initial Assessment:    currently 11 weeks pregnant, here c/o episodes of headaches, episodic nausea, for the past 2-3 weeks. Now with bilateral flank pain.   Clinical Tests:   Lab Tests: Ordered and Reviewed  ED Management:  Elevated blood pressure and 1+ proteinuria  noted.     No true CVA tenderness on exam. No dysuria complaints. She does have bilateral flank pain subjectively and UA shows significant UTI. Culture sent. Macrobid started in the ER.     Tylenol dose explained to the patient - she was only taking 325. Return if headache worse in any way.     I discussed the case in detail with Dr Montaño, on-call OB/GYN. He states to give Rx for Phenergan prn and Macrobid. He requests outpatient follow up.                    ED Course     Clinical Impression:   The primary encounter diagnosis was Nausea/vomiting in pregnancy. Diagnoses of Headache in pregnancy, first trimester, Elevated blood pressure reading, Gestational proteinuria in first trimester, and Urinary tract infection without hematuria, site unspecified were also pertinent to this visit.    Disposition:   Disposition: Discharged  Condition: Stable       Daniel Blanchard PA-C  04/05/17 3507

## 2017-04-05 NOTE — TELEPHONE ENCOUNTER
Spoke with patient, she will go to the ER for observation.  She has c/o constant N/V.  Can't tolerate food or liquids.  She states that she has constant headaches and dizziness.  Patient voices understanding.

## 2017-04-17 ENCOUNTER — ROUTINE PRENATAL (OUTPATIENT)
Dept: OBSTETRICS AND GYNECOLOGY | Facility: CLINIC | Age: 22
End: 2017-04-17
Payer: MEDICAID

## 2017-04-17 ENCOUNTER — PROCEDURE VISIT (OUTPATIENT)
Dept: OBSTETRICS AND GYNECOLOGY | Facility: CLINIC | Age: 22
End: 2017-04-17
Payer: MEDICAID

## 2017-04-17 VITALS
WEIGHT: 138.44 LBS | BODY MASS INDEX: 24.53 KG/M2 | SYSTOLIC BLOOD PRESSURE: 108 MMHG | DIASTOLIC BLOOD PRESSURE: 64 MMHG

## 2017-04-17 DIAGNOSIS — O20.9 VAGINAL BLEEDING BEFORE 22 WEEKS GESTATION: ICD-10-CM

## 2017-04-17 DIAGNOSIS — O20.9 VAGINAL BLEEDING BEFORE 22 WEEKS GESTATION: Primary | ICD-10-CM

## 2017-04-17 PROCEDURE — 99999 PR PBB SHADOW E&M-EST. PATIENT-LVL II: CPT | Mod: PBBFAC,,, | Performed by: NURSE PRACTITIONER

## 2017-04-17 PROCEDURE — 76801 OB US < 14 WKS SINGLE FETUS: CPT | Mod: PBBFAC | Performed by: OBSTETRICS & GYNECOLOGY

## 2017-04-17 PROCEDURE — 76801 OB US < 14 WKS SINGLE FETUS: CPT | Mod: 26,S$PBB,, | Performed by: OBSTETRICS & GYNECOLOGY

## 2017-04-17 PROCEDURE — 99213 OFFICE O/P EST LOW 20 MIN: CPT | Mod: S$PBB,TH,, | Performed by: NURSE PRACTITIONER

## 2017-04-17 RX ORDER — BUTALBITAL, ACETAMINOPHEN AND CAFFEINE 50; 325; 40 MG/1; MG/1; MG/1
1 TABLET ORAL EVERY 6 HOURS PRN
Qty: 30 TABLET | Refills: 0 | Status: SHIPPED | OUTPATIENT
Start: 2017-04-17 | End: 2017-05-17

## 2017-04-17 NOTE — PROGRESS NOTES
Patient is presenting 13 w 2 d. Patient reports ED visit for vaginal bleeding. No ultrasound obtained. Patient reports no pelvic pain and bleeding has resolved. States H/A are not resolved with tylenol. Meds sent in. Ultrasound obtained today, normal.Pelvic rest.

## 2017-04-17 NOTE — MR AVS SNAPSHOT
[facility]  [address]  [place] [address]  Phone: [phone]  Fax: [phone]                  April Pearl Khoury   2017 10:30 AM   Routine Prenatal    Description:  Female : 1995   Provider:  Sharona Perdomo NP   Department:  [facility]           Reason for Visit     Routine Prenatal Visit           Diagnoses this Visit        Comments    Vaginal bleeding before 22 weeks gestation    -  Primary            To Do List           Future Appointments        Provider Department Dept Phone    2017 12:30 PM ULTRASOUND, OB-GYN [facility] -[phone]    2017 9:30 AM Danielle Arguello CNM [facility]/ -[phone]      Goals (5 Years of Data)     None       These Medications        Disp Refills Start End    butalbital-acetaminophen-caffeine -40 mg (FIORICET, ESGIC) -40 mg per tablet 30 tablet 0 2017    Take 1 tablet by mouth every 6 (six) hours as needed for Pain. - Oral    Pharmacy: [address]  #: [phone]         [facility] On [facility]     [facility] Nurse Care Line -  Assistance  Unless otherwise directed by your provider, please contact [facility], our nurse care line that is available for  assistance.     Registered nurses in the [facility] provide: appointment scheduling, clinical advisement, health education, and other advisory services.  Call: [phone] (toll free)               Medications           Message regarding Medications     Verify the changes and/or additions to your medication regime listed below are the same as discussed with your clinician today.  If any of these changes or additions are incorrect, please notify your healthcare provider.        START taking these NEW medications        Refills    butalbital-acetaminophen-caffeine -40 mg (FIORICET, ESGIC) -40 mg per tablet 0    Sig: Take 1 tablet by mouth  every 6 (six) hours as needed for Pain.    Class: Normal    Route: Oral           Verify that the below list of medications is an accurate representation of the medications you are currently taking.  If none reported, the list may be blank. If incorrect, please contact your healthcare provider. Carry this list with you in case of emergency.           Current Medications     PNV62/FA/OM3/DHA/EPA/FISH OIL (PRENATAL GUMMY ORAL) Take by mouth once daily.    promethazine (PHENERGAN) 25 MG tablet Take 0.5 tablets (12.5 mg total) by mouth every 6 (six) hours as needed for Nausea.    butalbital-acetaminophen-caffeine -40 mg (FIORICET, ESGIC) -40 mg per tablet Take 1 tablet by mouth every 6 (six) hours as needed for Pain.           Clinical Reference Information           Prenatal Vitals     Enc. Date GA Prenatal Vitals Prenatal Pulse Pain Level Urine Albumin/Glucose Edema Presentation Dilation/Effacement/Station    4/17/17 13w2d 108/64 / 62.8 kg (138 lb 7.2 oz)  / 158 / Absent  0        3/28/17 10w3d 106/72 / 62.4 kg (137 lb 9.1 oz)  / 162 / Absent  0          Your Vitals Were     BP Weight Last Period BMI       108/64 62.8 kg (138 lb 7.2 oz) 01/14/2017 24.53 kg/m2       Allergies as of 4/17/2017     No Known Allergies      Immunizations Administered on Date of Encounter - 4/17/2017     None      Orders Placed During Today's Visit     Future Labs/Procedures Expected by Expires    US OB/GYN Procedure ([facility])  As directed 4/17/2018      Smoking Cessation     If you would like to quit smoking:   You may be eligible for free services if you are a [place] resident and started smoking cigarettes before September 1, 1988.  Call the Smoking Cessation Trust (SCT) toll free at (212) 045-2673 or (344) 398-6504.   Call -800-QUIT-NOW if you do not meet the above criteria.   Contact us via email: [email or web address]   View our website for more information: [email or web address]        Language Assistance  Services     ATTENTION: Language assistance services are available, free of charge. Please call [phone].      ATENCIÓN: Si habla ema, tiene a garcía disposición servicios gratuitos de asistencia lingüística. Llame al [phone].     CHÚ Ý: N?u b?n nói Ti?ng Vi?t, có các d?ch v? h? tr? ngôn ng? mi?n phí dành cho b?n. G?i s? [phone].         [facility] complies with applicable Federal civil rights laws and does not discriminate on the basis of race, color, national origin, age, disability, or sex.

## 2017-05-19 ENCOUNTER — HOSPITAL ENCOUNTER (EMERGENCY)
Facility: HOSPITAL | Age: 22
Discharge: HOME | End: 2017-05-19
Attending: EMERGENCY MEDICINE
Payer: MEDICAID

## 2017-05-19 ENCOUNTER — NURSE TRIAGE (OUTPATIENT)
Dept: ADMINISTRATIVE | Facility: CLINIC | Age: 22
End: 2017-05-19

## 2017-05-19 VITALS
HEIGHT: 63 IN | WEIGHT: 136 LBS | TEMPERATURE: 100 F | RESPIRATION RATE: 17 BRPM | HEART RATE: 118 BPM | SYSTOLIC BLOOD PRESSURE: 108 MMHG | OXYGEN SATURATION: 99 % | DIASTOLIC BLOOD PRESSURE: 59 MMHG | BODY MASS INDEX: 24.1 KG/M2

## 2017-05-19 DIAGNOSIS — J06.9 UPPER RESPIRATORY TRACT INFECTION, UNSPECIFIED TYPE: ICD-10-CM

## 2017-05-19 DIAGNOSIS — J32.9 SINUSITIS, UNSPECIFIED CHRONICITY, UNSPECIFIED LOCATION: Primary | ICD-10-CM

## 2017-05-19 DIAGNOSIS — Z3A.18 18 WEEKS GESTATION OF PREGNANCY: ICD-10-CM

## 2017-05-19 PROCEDURE — 63600175 PHARM REV CODE 636 W HCPCS: Performed by: EMERGENCY MEDICINE

## 2017-05-19 PROCEDURE — 99283 EMERGENCY DEPT VISIT LOW MDM: CPT | Mod: 25

## 2017-05-19 PROCEDURE — 96374 THER/PROPH/DIAG INJ IV PUSH: CPT

## 2017-05-19 PROCEDURE — 25000003 PHARM REV CODE 250: Performed by: EMERGENCY MEDICINE

## 2017-05-19 RX ORDER — ACETAMINOPHEN 500 MG
1000 TABLET ORAL
Status: COMPLETED | OUTPATIENT
Start: 2017-05-19 | End: 2017-05-19

## 2017-05-19 RX ORDER — DEXAMETHASONE SODIUM PHOSPHATE 4 MG/ML
4 INJECTION, SOLUTION INTRA-ARTICULAR; INTRALESIONAL; INTRAMUSCULAR; INTRAVENOUS; SOFT TISSUE
Status: COMPLETED | OUTPATIENT
Start: 2017-05-19 | End: 2017-05-19

## 2017-05-19 RX ORDER — AMOXICILLIN 875 MG/1
875 TABLET, FILM COATED ORAL 2 TIMES DAILY
Qty: 14 TABLET | Refills: 0 | Status: SHIPPED | OUTPATIENT
Start: 2017-05-19 | End: 2017-07-24

## 2017-05-19 RX ORDER — SODIUM CHLORIDE 9 MG/ML
1000 INJECTION, SOLUTION INTRAVENOUS
Status: COMPLETED | OUTPATIENT
Start: 2017-05-19 | End: 2017-05-19

## 2017-05-19 RX ADMIN — SODIUM CHLORIDE 1000 ML: 0.9 INJECTION, SOLUTION INTRAVENOUS at 11:05

## 2017-05-19 RX ADMIN — DEXAMETHASONE SODIUM PHOSPHATE 4 MG: 4 INJECTION, SOLUTION INTRAMUSCULAR; INTRAVENOUS at 11:05

## 2017-05-19 RX ADMIN — ACETAMINOPHEN 1000 MG: 500 TABLET ORAL at 11:05

## 2017-05-19 NOTE — ED AVS SNAPSHOT
[facility]  [address] [facility] [address]               April Pearl Khoury   2017 10:20 PM   ED    Description:  Female : 1995   Department:  [facility]            Your Care was Coordinated By:     Provider Role From To    Herrera Oneil Jr., MD Attending Provider 17 [phone] --      Reason for Visit     Fever     Nasal Congestion           Diagnoses this Visit        Comments    Sinusitis, unspecified chronicity, unspecified location    -  Primary     Upper respiratory tract infection, unspecified type         18 weeks gestation of pregnancy           ED Disposition     None           To Do List           Follow-up Information     Follow up with NORMA Lane. Schedule an appointment as soon as possible for a visit in 3 days.    Specialty:  Family Medicine    Why:  As needed    Contact information:    [id number] [facility]  [facility] [address] [phone]         These Medications        Disp Refills Start End    amoxicillin (AMOXIL) 875 MG tablet 14 tablet 0 2017     Take 1 tablet (875 mg total) by mouth 2 (two) times daily. - Oral    Pharmacy: [place] [id number] Ph #: [phone]         [facility]     [facility] Care [facility] -  Assistance  Unless otherwise directed by your provider, please contact [facility], our nurse care line that is available for  assistance.     Registered nurses in the [facility] provide: appointment scheduling, clinical advisement, health education, and other advisory services.  Call: [phone] (toll free)               Medications           Message regarding Medications     Verify the changes and/or additions to your medication regime listed below are the same as discussed with your clinician today.  If any of these changes or additions are incorrect, please notify  "your healthcare provider.        START taking these NEW medications        Refills    amoxicillin (AMOXIL) 875 MG tablet 0    Sig: Take 1 tablet (875 mg total) by mouth 2 (two) times daily.    Class: Normal    Route: Oral      These medications were administered today        Dose Freq    0.9%  NaCl infusion 1,000 mL ED 1 Time    Sig: Inject 1,000 mLs into the vein ED 1 Time.    Class: Normal    Route: Intravenous    dexamethasone injection 4 mg 4 mg ED 1 Time    Sig: Inject 1 mL (4 mg total) into the vein ED 1 Time.    Class: Normal    Route: Intravenous    acetaminophen tablet 1,000 mg 1,000 mg ED 1 Time    Sig: Take 2 tablets (1,000 mg total) by mouth ED 1 Time.    Class: Normal    Route: Oral           Verify that the below list of medications is an accurate representation of the medications you are currently taking.  If none reported, the list may be blank. If incorrect, please contact your healthcare provider. Carry this list with you in case of emergency.           Current Medications     PNV62/FA/OM3/DHA/EPA/FISH OIL (PRENATAL GUMMY ORAL) Take by mouth once daily.    promethazine (PHENERGAN) 25 MG tablet Take 0.5 tablets (12.5 mg total) by mouth every 6 (six) hours as needed for Nausea.    amoxicillin (AMOXIL) 875 MG tablet Take 1 tablet (875 mg total) by mouth 2 (two) times daily.           Clinical Reference Information           Your Vitals Were     BP Pulse Temp Resp Height Weight    108/59 118 100.3 °F (37.9 °C) (Oral) 17 5' 3" (1.6 m) 61.7 kg (136 lb)    Last Period SpO2 BMI          01/14/2017 99% 24.09 kg/m2        Allergies as of 5/19/2017     No Known Allergies      Immunizations Administered on Date of Encounter - 5/19/2017     None      ED Micro, Lab, POCT     None      ED Imaging Orders     None        Discharge Instructions         Pregnancy: Your Second Trimester Changes    Each day, you and your baby are changing and growing together. Heres a quick look at whats happening to both of " you.  How You Are Changing  Even when you dont notice it, your body is adapting to meet the needs of your growing baby. The changes in your body might also affect your moods.  Your body  Your uterus expands as baby grows. As the weeks go by, you will feel more pressure on your bladder, stomach, and other organs. You may notice some skin color changes on your forehead, nose, or cheeks. Freckles may darken, and moles may grow. You may notice a darker line on your abdomen between your belly button and pubic bone in the midline.  Your moods  The second trimester is often easier than the first. Still, be prepared for mood swings. These are due to the increase in hormones (chemicals that affect the way organs work) produced by your body. These mood swings are a normal part of pregnancy.  How your baby is growing       Month 4  Babys heartbeat may be heard with a Doppler (hand-held ultrasound device) by 9 to 10 weeks. Eyebrows, eyelashes and fingernails begin to form.  Month 5  You may feel your baby move. After a growth spurt, your baby nears 10 inches. Month 6  Babys fingerprints have formed. Your baby weighs about 1  to 2 pounds and is about 12 inches long.   Date Last Reviewed: 8/16/2015 © 2000-2016 [employer]. [address] [place] [address], [place] [address]. All rights reserved. This information is not intended as a substitute for professional medical care. Always follow your healthcare professional's instructions.          Sinusitis (Antibiotic Treatment)    The sinuses are air-filled spaces within the bones of the face. They connect to the inside of the nose. Sinusitis is an inflammation of the tissue lining the sinus cavity. Sinus inflammation can occur during a cold. It can also be due to allergies to pollens and other particles in the air. Sinusitis can cause symptoms of sinus congestion and fullness. A sinus infection causes fever, headache and facial pain. There is often green or yellow drainage  from the nose or into the back of the throat (post-nasal drip). You have been given antibiotics to treat this condition.  Home care:  · Take the full course of antibiotics as instructed. Do not stop taking them, even if you feel better.  · Drink plenty of water, hot tea, and other liquids. This may help thin mucus. It also may promote sinus drainage.  · Heat may help soothe painful areas of the face. Use a towel soaked in hot water. Or,  the shower and direct the hot spray onto your face. Using a vaporizer along with a menthol rub at night may also help.   · An expectorant containing guaifenesin may help thin the mucus and promote drainage from the sinuses.  · Over-the-counter decongestants may be used unless a similar medicine was prescribed. Nasal sprays work the fastest. Use one that contains phenylephrine or oxymetazoline. First blow the nose gently. Then use the spray. Do not use these medicines more often than directed on the label or symptoms may get worse. You may also use tablets containing pseudoephedrine. Avoid products that combine ingredients, because side effects may be increased. Read labels. You can also ask the pharmacist for help. (NOTE: Persons with high blood pressure should not use decongestants. They can raise blood pressure.)  · Over-the-counter antihistamines may help if allergies contributed to your sinusitis.    · Do not use nasal rinses or irrigation during an acute sinus infection, unless told to by your health care provider. Rinsing may spread the infection to other sinuses.  · Use acetaminophen or ibuprofen to control pain, unless another pain medicine was prescribed. (If you have chronic liver or kidney disease or ever had a stomach ulcer, talk with your doctor before using these medicines. Aspirin should never be used in anyone under 18 years of age who is ill with a fever. It may cause severe liver damage.)  · Don't smoke. This can worsen symptoms.  Follow-up care  Follow up  with your healthcare provider or our staff if you are not improving within the next week.  When to seek medical advice  Call your healthcare provider if any of these occur:  · Facial pain or headache becoming more severe  · Stiff neck  · Unusual drowsiness or confusion  · Swelling of the forehead or eyelids  · Vision problems, including blurred or double vision  · Fever of 100.4ºF (38ºC) or higher, or as directed by your healthcare provider  · Seizure  · Breathing problems  · Symptoms not resolving within 10 days  Date Last Reviewed: 4/13/2015 © 2000-2016 [employer]. [address] [place] [address]. All rights reserved. This information is not intended as a substitute for professional medical care. Always follow your healthcare professional's instructions.          Smoking Cessation     If you would like to quit smoking:   You may be eligible for free services if you are a [place] resident and started smoking cigarettes before September 1, 1988.  Call the Smoking Cessation Trust (SCT) toll free at (300) 876-7454 or (573) 006-8464.   Call 1-800-QUIT-NOW if you do not meet the above criteria.   Contact us via email: [email or web address]   View our website for more information: [email or web address]         [facility]  complies with applicable Federal civil rights laws and does not discriminate on the basis of race, color, national origin, age, disability, or sex.        Language Assistance Services     ATTENTION: Language assistance services are available, free of charge. Please call [phone].      ATENCIÓN: Si habla español, tiene a garcía disposición servicios gratuitos de asistencia lingüística. Llame al [phone].     CHÚ Ý: N?u b?n nói Ti?ng Vi?t, có các d?ch v? h? tr? ngôn ng? mi?n phí dành cho b?n. G?i s? [phone].

## 2017-05-20 NOTE — TELEPHONE ENCOUNTER
"  Reason for Disposition   [1] Fever > 100.5 F (38.1 C) with cold symptoms AND [2] lasts > 3 days    Answer Assessment - Initial Assessment Questions  1. TEMPERATURE: "What is the most recent temperature?"  "How was it measured?"       100. oral  2. ONSET: "When did the fever start?"       today  3. SYMPTOMS: "Do you have any other symptoms besides the fever?"   (e.g., cough, cold symptoms, sore throat, rash, diarrhea, vomiting, abdominal pain, urine problems)      Headache,dizzy,nasal stuffiness  4. CAUSE: If there are no symptoms, ask: "What do you think is causing the fever?"       unsure  5. CONTACTS: "Does anyone else in the family have an infection?"      no  6. TREATMENT: "What have you done so far to treat this fever?" (e.g., medications)      Tylenol 2 hours  7. IMMUNOCOMPROMISE: "Do you have of the following: diabetes, HIV positive, splenectomy, chronic steroid treatment, transplant patient, etc."      no  8. OTHER SYMPTOMS: "Do you have any other symptoms?" (e.g., abdominal pain, fever, vaginal   bleeding, decreased fetal movement)      100.0  9. CAIN: "What date are you expecting to deliver?      10/21/2017  10. TRAVEL: "Have you traveled out of the country in the last month?" (e.g., travel history, exposures)        no    Protocols used: ST PREGNANCY - FEVER-A-AH    "

## 2017-05-20 NOTE — DISCHARGE INSTRUCTIONS
Pregnancy: Your Second Trimester Changes    Each day, you and your baby are changing and growing together. Heres a quick look at whats happening to both of you.  How You Are Changing  Even when you dont notice it, your body is adapting to meet the needs of your growing baby. The changes in your body might also affect your moods.  Your body  Your uterus expands as baby grows. As the weeks go by, you will feel more pressure on your bladder, stomach, and other organs. You may notice some skin color changes on your forehead, nose, or cheeks. Freckles may darken, and moles may grow. You may notice a darker line on your abdomen between your belly button and pubic bone in the midline.  Your moods  The second trimester is often easier than the first. Still, be prepared for mood swings. These are due to the increase in hormones (chemicals that affect the way organs work) produced by your body. These mood swings are a normal part of pregnancy.  How your baby is growing       Month 4  Babys heartbeat may be heard with a Doppler (hand-held ultrasound device) by 9 to 10 weeks. Eyebrows, eyelashes and fingernails begin to form.  Month 5  You may feel your baby move. After a growth spurt, your baby nears 10 inches. Month 6  Babys fingerprints have formed. Your baby weighs about 1  to 2 pounds and is about 12 inches long.   Date Last Reviewed: 8/16/2015  © 9749-1815 [employer]. [address] [place] [address]. All rights reserved. This information is not intended as a substitute for professional medical care. Always follow your healthcare professional's instructions.          Sinusitis (Antibiotic Treatment)    The sinuses are air-filled spaces within the bones of the face. They connect to the inside of the nose. Sinusitis is an inflammation of the tissue lining the sinus cavity. Sinus inflammation can occur during a cold. It can also be due to allergies to pollens and other particles in the air.  Sinusitis can cause symptoms of sinus congestion and fullness. A sinus infection causes fever, headache and facial pain. There is often green or yellow drainage from the nose or into the back of the throat (post-nasal drip). You have been given antibiotics to treat this condition.  Home care:  · Take the full course of antibiotics as instructed. Do not stop taking them, even if you feel better.  · Drink plenty of water, hot tea, and other liquids. This may help thin mucus. It also may promote sinus drainage.  · Heat may help soothe painful areas of the face. Use a towel soaked in hot water. Or,  the shower and direct the hot spray onto your face. Using a vaporizer along with a menthol rub at night may also help.   · An expectorant containing guaifenesin may help thin the mucus and promote drainage from the sinuses.  · Over-the-counter decongestants may be used unless a similar medicine was prescribed. Nasal sprays work the fastest. Use one that contains phenylephrine or oxymetazoline. First blow the nose gently. Then use the spray. Do not use these medicines more often than directed on the label or symptoms may get worse. You may also use tablets containing pseudoephedrine. Avoid products that combine ingredients, because side effects may be increased. Read labels. You can also ask the pharmacist for help. (NOTE: Persons with high blood pressure should not use decongestants. They can raise blood pressure.)  · Over-the-counter antihistamines may help if allergies contributed to your sinusitis.    · Do not use nasal rinses or irrigation during an acute sinus infection, unless told to by your health care provider. Rinsing may spread the infection to other sinuses.  · Use acetaminophen or ibuprofen to control pain, unless another pain medicine was prescribed. (If you have chronic liver or kidney disease or ever had a stomach ulcer, talk with your doctor before using these medicines. Aspirin should never be used  in anyone under 18 years of age who is ill with a fever. It may cause severe liver damage.)  · Don't smoke. This can worsen symptoms.  Follow-up care  Follow up with your healthcare provider or our staff if you are not improving within the next week.  When to seek medical advice  Call your healthcare provider if any of these occur:  · Facial pain or headache becoming more severe  · Stiff neck  · Unusual drowsiness or confusion  · Swelling of the forehead or eyelids  · Vision problems, including blurred or double vision  · Fever of 100.4ºF (38ºC) or higher, or as directed by your healthcare provider  · Seizure  · Breathing problems  · Symptoms not resolving within 10 days  Date Last Reviewed: 4/13/2015  © 3891-8765 [employer]. [address] [place] [address], [place] [address]. All rights reserved. This information is not intended as a substitute for professional medical care. Always follow your healthcare professional's instructions.

## 2017-05-20 NOTE — ED PROVIDER NOTES
SCRIBE #1 NOTE: I, Zaid Villasenor, am scribing for, and in the presence of, Herrera Oneil Jr., MD. I have scribed the entire note.      History      Chief Complaint   Patient presents with    Fever     pt is 18weeks pregnant.  fever, chills, nasal congestion, runny nose for few days.      Nasal Congestion       Review of patient's allergies indicates:  No Known Allergies     HPI   HPI    5/19/2017, 10:35 PM   History obtained from the patient      History of Present Illness: April Pearl Khoury is a 22 y.o. female patient, who is 18 weeks pregnant, presents to the Emergency Department for nasal congestion which onset gradually yesterday. Symptoms are constant and moderate in severity.  No mitigating or exacerbating factors reported. Associated sxs include subjective fever, chills, lightheadedness, and vaginal discharge. Patient denies any CP, SOB, HA, vaginal bleeding, pelvic pain, abdominal pain, dysuria, and all other sxs at this time. No further complaints or concerns at this time.       Arrival mode: Personal vehicle      PCP: NORMA Lane       Past Medical History:  Past Medical History:   Diagnosis Date    ADD (attention deficit disorder)     OCD (obsessive compulsive disorder)     Vaginal laceration 12/23/2014       Past Surgical History:  History reviewed. No pertinent surgical history.    Family History:  Family History   Problem Relation Age of Onset    Diabetes Paternal Grandmother     Hypertension Paternal Grandmother     Diabetes Maternal Grandmother     Hypertension Maternal Grandmother        Social History:  Social History     Social History Main Topics    Smoking status: Current Every Day Smoker     Packs/day: 0.25    Smokeless tobacco: Never Used      Comment: 6-10 cigarettes    Alcohol use No    Drug use: No    Sexual activity: Yes     Partners: Male       ROS   Review of Systems   Constitutional: Positive for chills and fever (subjective).   HENT: Positive for congestion.  Negative for rhinorrhea and sore throat.    Respiratory: Negative for shortness of breath.    Cardiovascular: Negative for chest pain.   Gastrointestinal: Negative for abdominal pain and nausea.   Genitourinary: Positive for vaginal discharge. Negative for dysuria, pelvic pain and vaginal bleeding.   Musculoskeletal: Negative for back pain.   Skin: Negative for rash.   Neurological: Positive for light-headedness. Negative for weakness and headaches.   Hematological: Does not bruise/bleed easily.   All other systems reviewed and are negative.    Physical Exam    Initial Vitals   BP Pulse Resp Temp SpO2   05/19/17 2149 05/19/17 2149 05/19/17 2149 05/19/17 2149 05/19/17 2149   133/68 120 18 100.3 °F (37.9 °C) 99 %      Physical Exam  Nursing Notes and Vital Signs Reviewed.  Constitutional: Patient is in no acute distress. Well-developed and well-nourished.  Head: Atraumatic. Normocephalic.  Eyes: PERRL. EOM intact. Conjunctivae are not pale. No scleral icterus  Ears: Right TM normal. Left TM normal. No erythema. No bulging. No effusion or air-fluid levels. No perforation.   Nose: Patent nares. Turbinates are normal. No drainage.   Throat: Moist mucous membranes. Posterior oropharynx is symmetric with erythema. Tonsillar exudate is  present. No trismus. Normal handling of secretions. No stridor.  Neck: Supple. Full ROM. No lymphadenopathy.  Cardiovascular: Tachycardic. Regular rhythm. No murmurs, rubs, or gallops. Distal pulses are 2+ and symmetric.  Pulmonary/Chest: No respiratory distress. Bilateral sinus congestion. No wheezing, rales, or rhonchi.  Abdominal: Soft and non-distended.  There is no tenderness.  No rebound, guarding, or rigidity. Good bowel sounds.  Musculoskeletal: Moves all extremities. No obvious deformities. No edema. No calf tenderness.  Skin: Warm and dry.  Neurological:  Alert, awake, and appropriate.  Normal speech.  No acute focal neurological deficits are appreciated.  Psychiatric: Normal  "affect. Good eye contact. Appropriate in content.      ED Course    Procedures  ED Vital Signs:  Vitals:    05/19/17 2149 05/19/17 2308   BP: 133/68 (!) 108/59   Pulse: (!) 120 (!) 118   Resp: 18 17   Temp: 100.3 °F (37.9 °C)    TempSrc: Oral    SpO2: 99%    Weight: 61.7 kg (136 lb)    Height: 5' 3" (1.6 m)              The Emergency Provider reviewed the vital signs and test results, which are outlined above.    ED Discussion     11:58 PM: Reassessed pt at this time. Pt reports she is feeling much better and is ready to go home. Discussed with pt all pertinent ED information and results. Discussed pt dx and plan of tx. Gave pt all f/u and return to the ED instructions. All questions and concerns were addressed at this time. Pt expresses understanding of information and instructions, and is comfortable with plan to discharge. Pt is stable for discharge.    Patient presents with upper respiratory and flulike symptoms. Based on my assessment in the ED, I do not suspect any respiratory, airway, pulmonary, cardiovascular (including myocarditis), metabolic, CNS, medical, or surgical emergency medical condition. I have discussed with the patient and/or caregiver signs and symptoms for secondary bacterial infections, such as pneumonia. I believe that the patient's symptoms are most consistent with a viral illness, possibly influenza. Patient is safe for discharge home with conservative therapy.      ED Medication(s):  Medications   0.9%  NaCl infusion (0 mLs Intravenous Stopped 5/19/17 2326)   dexamethasone injection 4 mg (4 mg Intravenous Given 5/19/17 2300)   acetaminophen tablet 1,000 mg (1,000 mg Oral Given 5/19/17 2300)       New Prescriptions    No medications on file       Follow-up Information     Follow up with NORMA Lane. Schedule an appointment as soon as possible for a visit in 3 days.    Specialty:  Family Medicine    Why:  As needed    Contact information:    [address]  [address]  [facility]  [facility] [address]  [phone]              Medical Decision Making              Scribe Attestation:   Scribe #1: I performed the above scribed service and the documentation accurately describes the services I performed. I attest to the accuracy of the note.    Attending:   Physician Attestation Statement for Scribe #1: I, Herrera Oneil Jr., MD, personally performed the services described in this documentation, as scribed by Zaid Villasenor, in my presence, and it is both accurate and complete.          Clinical Impression       ICD-10-CM ICD-9-CM   1. Sinusitis, unspecified chronicity, unspecified location J32.9 473.9   2. Upper respiratory tract infection, unspecified type J06.9 465.9   3. 18 weeks gestation of pregnancy Z3A.18 V22.2       Disposition:   Disposition: Discharged  Condition: Stable         Herrera Oneil Jr., MD  05/20/17 0007

## 2017-07-20 ENCOUNTER — HOSPITAL ENCOUNTER (EMERGENCY)
Facility: HOSPITAL | Age: 22
Discharge: HOME | End: 2017-07-20
Payer: MEDICAID

## 2017-07-20 ENCOUNTER — TELEPHONE (OUTPATIENT)
Dept: OBSTETRICS AND GYNECOLOGY | Facility: CLINIC | Age: 22
End: 2017-07-20

## 2017-07-20 VITALS
OXYGEN SATURATION: 100 % | BODY MASS INDEX: 24.45 KG/M2 | HEIGHT: 63 IN | TEMPERATURE: 98 F | WEIGHT: 138 LBS | RESPIRATION RATE: 16 BRPM | DIASTOLIC BLOOD PRESSURE: 64 MMHG | SYSTOLIC BLOOD PRESSURE: 114 MMHG | HEART RATE: 87 BPM

## 2017-07-20 DIAGNOSIS — N39.0 URINARY TRACT INFECTION WITHOUT HEMATURIA, SITE UNSPECIFIED: Primary | ICD-10-CM

## 2017-07-20 LAB
BACTERIA #/AREA URNS HPF: ABNORMAL /HPF
BILIRUB UR QL STRIP: NEGATIVE
CLARITY UR: ABNORMAL
COLOR UR: YELLOW
GLUCOSE UR QL STRIP: NEGATIVE
HGB UR QL STRIP: ABNORMAL
HYALINE CASTS #/AREA URNS LPF: 0 /LPF
KETONES UR QL STRIP: NEGATIVE
LEUKOCYTE ESTERASE UR QL STRIP: ABNORMAL
MICROSCOPIC COMMENT: ABNORMAL
NITRITE UR QL STRIP: NEGATIVE
PH UR STRIP: 6 [PH] (ref 5–8)
PROT UR QL STRIP: ABNORMAL
RBC #/AREA URNS HPF: 20 /HPF (ref 0–4)
SP GR UR STRIP: 1.02 (ref 1–1.03)
SQUAMOUS #/AREA URNS HPF: 5 /HPF
URN SPEC COLLECT METH UR: ABNORMAL
UROBILINOGEN UR STRIP-ACNC: NEGATIVE EU/DL
WBC #/AREA URNS HPF: >100 /HPF (ref 0–5)
WBC CLUMPS URNS QL MICRO: ABNORMAL

## 2017-07-20 PROCEDURE — 63600175 PHARM REV CODE 636 W HCPCS: Performed by: PHYSICIAN ASSISTANT

## 2017-07-20 PROCEDURE — 81000 URINALYSIS NONAUTO W/SCOPE: CPT

## 2017-07-20 PROCEDURE — 96372 THER/PROPH/DIAG INJ SC/IM: CPT

## 2017-07-20 PROCEDURE — 99283 EMERGENCY DEPT VISIT LOW MDM: CPT | Mod: 25

## 2017-07-20 RX ORDER — CEFTRIAXONE 250 MG/1
1 INJECTION, POWDER, FOR SOLUTION INTRAMUSCULAR; INTRAVENOUS ONCE
Status: DISCONTINUED | OUTPATIENT
Start: 2017-07-20 | End: 2017-07-20

## 2017-07-20 RX ORDER — NITROFURANTOIN 25; 75 MG/1; MG/1
100 CAPSULE ORAL 2 TIMES DAILY
Qty: 20 CAPSULE | Refills: 0 | Status: SHIPPED | OUTPATIENT
Start: 2017-07-20 | End: 2017-07-30

## 2017-07-20 RX ORDER — CEFTRIAXONE 1 G/1
1 INJECTION, POWDER, FOR SOLUTION INTRAMUSCULAR; INTRAVENOUS ONCE
Status: COMPLETED | OUTPATIENT
Start: 2017-07-20 | End: 2017-07-20

## 2017-07-20 RX ADMIN — CEFTRIAXONE 1 G: 1 INJECTION, POWDER, FOR SOLUTION INTRAMUSCULAR; INTRAVENOUS at 05:07

## 2017-07-20 NOTE — TELEPHONE ENCOUNTER
----- Message from Gill Azar sent at 7/20/2017 11:37 AM CDT -----  Contact: Pt  Pt called and stated she needed to speak to the nurse. She stated she has kidney pain and needs to be seen today and is requesting to be squeezed into the schedule. She can be reached at [phone].    Thanks,  TF

## 2017-07-20 NOTE — ED PROVIDER NOTES
"SCRIBE #1 NOTE: I, Silvia Lana, am scribing for, and in the presence of, PERRY Roberts. I have scribed the entire note.      History      Chief Complaint   Patient presents with    Urinary Pressure     26 weeks preg and "feels like I have another kidney infection"       Review of patient's allergies indicates:  No Known Allergies     HPI   HPI    7/20/2017, 4:02 PM   History obtained from the patient      History of Present Illness: April Pearl Khoury is a 22 y.o. female patient who presents to the Emergency Department for lower back pain which onset gradually yesterday. Symptoms are constant and moderate in severity. Pt reports being 26 weeks pregnant. Pt states, "it feels like I have another kidney infection". No mitigating or exacerbating factors reported. Associated sxs include dysuria  and abd pain. Patient denies any CP, SOB, fever, chills, emesis, diarrhea, and all other sxs at this time. No prior Tx. No further complaints or concerns at this time.         Arrival mode: Personal vehicle    PCP: NORMA Lane       Past Medical History:  Past Medical History:   Diagnosis Date    ADD (attention deficit disorder)     OCD (obsessive compulsive disorder)     Vaginal laceration 12/23/2014       Past Surgical History:  Past surgical history reviewed not relevant      Family History:  Family History   Problem Relation Age of Onset    Diabetes Paternal Grandmother     Hypertension Paternal Grandmother     Diabetes Maternal Grandmother     Hypertension Maternal Grandmother        Social History:  Social History     Social History Main Topics    Smoking status: Current Every Day Smoker     Packs/day: 0.25    Smokeless tobacco: Never Used      Comment: 5-6 cigarettes daily     Alcohol use No    Drug use: No    Sexual activity: Yes     Partners: Male       ROS   Review of Systems   Constitutional: Negative for chills and fever.   HENT: Negative for sore throat.    Respiratory: Negative for " "shortness of breath.    Cardiovascular: Negative for chest pain.   Gastrointestinal: Positive for abdominal pain. Negative for diarrhea, nausea and vomiting.   Genitourinary: Positive for dysuria.   Musculoskeletal: Positive for back pain. Gait problem: lower.   Skin: Negative for rash.   Neurological: Negative for weakness.   Hematological: Does not bruise/bleed easily.   All other systems reviewed and are negative.      Physical Exam      Initial Vitals [07/20/17 1530]   BP Pulse Resp Temp SpO2   111/62 91 16 98.2 °F (36.8 °C) 99 %      MAP       78.33          Physical Exam  Nursing Notes and Vital Signs Reviewed.  Constitutional: Patient is in no acute distress. Well-developed and well-nourished.  Head: Atraumatic. Normocephalic.  Eyes: PERRL. EOM intact. Conjunctivae are not pale. No scleral icterus.  ENT: Mucous membranes are moist. Oropharynx is clear and symmetric.    Neck: Supple. Full ROM. No lymphadenopathy.  Cardiovascular: Regular rate. Regular rhythm. No murmurs, rubs, or gallops. Distal pulses are 2+ and symmetric.  Pulmonary/Chest: No respiratory distress. Clear to auscultation bilaterally. No wheezing, rales, or rhonchi.  Abdominal: Soft and non-distended.  There is no tenderness.  No rebound, guarding, or rigidity. Good bowel sounds.  Genitourinary: CVA tenderness  Musculoskeletal: Moves all extremities. No obvious deformities. No edema. No calf tenderness.  Skin: Warm and dry.  Neurological:  Alert, awake, and appropriate.  Normal speech.  No acute focal neurological deficits are appreciated.  Psychiatric: Normal affect. Good eye contact. Appropriate in content.    ED Course    Procedures  ED Vital Signs:  Vitals:    07/20/17 1530 07/20/17 1808   BP: 111/62 114/64   Pulse: 91 87   Resp: 16 16   Temp: 98.2 °F (36.8 °C) 98 °F (36.7 °C)   TempSrc: Oral Oral   SpO2: 99% 100%   Weight: 62.6 kg (138 lb)    Height: 5' 3" (1.6 m)        Abnormal Lab Results:  Labs Reviewed   URINALYSIS - Abnormal; Notable " for the following:        Result Value    Appearance, UA Cloudy (*)     Protein, UA 2+ (*)     Occult Blood UA 2+ (*)     Leukocytes, UA 2+ (*)     All other components within normal limits   URINALYSIS MICROSCOPIC - Abnormal; Notable for the following:     RBC, UA 20 (*)     WBC, UA >100 (*)     WBC Clumps, UA Occasional (*)     Bacteria, UA Many (*)     All other components within normal limits        All Lab Results:  Results for orders placed or performed during the hospital encounter of 07/20/17   Urinalysis   Result Value Ref Range    Specimen UA Urine, Clean Catch     Color, UA Yellow Yellow, Straw, Gavi    Appearance, UA Cloudy (A) Clear    pH, UA 6.0 5.0 - 8.0    Specific Gravity, UA 1.025 1.005 - 1.030    Protein, UA 2+ (A) Negative    Glucose, UA Negative Negative    Ketones, UA Negative Negative    Bilirubin (UA) Negative Negative    Occult Blood UA 2+ (A) Negative    Nitrite, UA Negative Negative    Urobilinogen, UA Negative <2.0 EU/dL    Leukocytes, UA 2+ (A) Negative   Urinalysis Microscopic   Result Value Ref Range    RBC, UA 20 (H) 0 - 4 /hpf    WBC, UA >100 (H) 0 - 5 /hpf    WBC Clumps, UA Occasional (A) None-Rare    Bacteria, UA Many (A) None-Occ /hpf    Squam Epithel, UA 5 /hpf    Hyaline Casts, UA 0 0-1/lpf /lpf    Microscopic Comment SEE COMMENT                 The Emergency Provider reviewed the vital signs and test results, which are outlined above.    ED Discussion     5:20 PM: Discussed with pt all pertinent ED information and results. Discussed pt dx and plan of tx. Gave pt all f/u and return to the ED instructions. All questions and concerns were addressed at this time. Pt expresses understanding of information and instructions, and is comfortable with plan to discharge. Pt is stable for discharge.        ED Medication(s):  Medications   cefTRIAXone injection 1 g (1 g Intramuscular Given 7/20/17 9189)       Discharge Medication List as of 7/20/2017  5:23 PM      START taking these  medications    Details   nitrofurantoin, macrocrystal-monohydrate, (MACROBID) 100 MG capsule Take 1 capsule (100 mg total) by mouth 2 (two) times daily., Starting u 7/20/2017, Until Sun 7/30/2017, Print             Follow-up Information     OBGYN In 1 day.           [facility]    Specialty:  Emergency Medicine  Why:  If symptoms worsen  Contact information:  [address]  [facility] [address] [phone]                   Medical Decision Making    Medical Decision Making:   Clinical Tests:   Lab Tests: Ordered and Reviewed           Scribe Attestation:   Scribe #1: I performed the above scribed service and the documentation accurately describes the services I performed. I attest to the accuracy of the note.    Attending:   Physician Attestation Statement for Scribe #1: I, PERRY Roberts, personally performed the services described in this documentation, as scribed by Silvia Schwartz, in my presence, and it is both accurate and complete.          Clinical Impression       ICD-10-CM ICD-9-CM   1. Urinary tract infection without hematuria, site unspecified N39.0 599.0       Disposition:   Disposition: Discharged  Condition: Stable         PERRY Green  07/26/17 0822       PERRY Green  07/26/17 0823

## 2017-07-20 NOTE — TELEPHONE ENCOUNTER
Spoke with patient;  She is complaining of kidney pain.  Advised patient to see her primary care doctor or [facility] of evaluation of kidney pain.  Also, advised patient that she is overdue for her OB visit appointments.  She has not been seen in three months.  Patient verbalized understanding and states she will make an appointment.  She is transferred to the appointment desk to make appointment asap.

## 2017-07-20 NOTE — ED NOTES
Pt denies any SOB, breath sounds are equal and unlabored. No redness or swelling noted to injection site. Pt is AAOx3, in NAD, ambulatory with steady gait. Pt ready for dc per provider.

## 2017-07-24 ENCOUNTER — ROUTINE PRENATAL (OUTPATIENT)
Dept: OBSTETRICS AND GYNECOLOGY | Facility: CLINIC | Age: 22
End: 2017-07-24
Payer: MEDICAID

## 2017-07-24 ENCOUNTER — PATIENT MESSAGE (OUTPATIENT)
Dept: OBSTETRICS AND GYNECOLOGY | Facility: CLINIC | Age: 22
End: 2017-07-24

## 2017-07-24 VITALS
WEIGHT: 141.13 LBS | SYSTOLIC BLOOD PRESSURE: 114 MMHG | DIASTOLIC BLOOD PRESSURE: 72 MMHG | BODY MASS INDEX: 24.99 KG/M2

## 2017-07-24 DIAGNOSIS — Z34.80 SUPERVISION OF OTHER NORMAL PREGNANCY: ICD-10-CM

## 2017-07-24 DIAGNOSIS — O09.32 INSUFFICIENT PRENATAL CARE IN SECOND TRIMESTER: ICD-10-CM

## 2017-07-24 DIAGNOSIS — Z36.89 ENCOUNTER FOR FETAL ANATOMIC SURVEY: ICD-10-CM

## 2017-07-24 DIAGNOSIS — O23.43 RECURRENT UTI (URINARY TRACT INFECTION) COMPLICATING PREGNANCY, THIRD TRIMESTER: Primary | ICD-10-CM

## 2017-07-24 PROCEDURE — 99213 OFFICE O/P EST LOW 20 MIN: CPT | Mod: TH,S$PBB,, | Performed by: ADVANCED PRACTICE MIDWIFE

## 2017-07-24 PROCEDURE — 99213 OFFICE O/P EST LOW 20 MIN: CPT | Mod: PBBFAC | Performed by: ADVANCED PRACTICE MIDWIFE

## 2017-07-24 PROCEDURE — 99999 PR PBB SHADOW E&M-EST. PATIENT-LVL III: CPT | Mod: PBBFAC,,, | Performed by: ADVANCED PRACTICE MIDWIFE

## 2017-07-24 RX ORDER — NITROFURANTOIN 25; 75 MG/1; MG/1
100 CAPSULE ORAL DAILY
Qty: 30 CAPSULE | Refills: 0 | Status: SHIPPED | OUTPATIENT
Start: 2017-07-24 | End: 2017-07-31

## 2017-07-24 NOTE — PROGRESS NOTES
Pt missed 3 appts due to moving,  lost job, car troubles. Went to ED X 2 for UTIs, on macrobid presently. Will start suppressive tx. Had gender US it's a girl. Anatomy scan sched, 28 wk labs, tdap handout provided. States good FM, denies UC. [employer] counseling sheet Keep Baby Close discussed with mother. Reinforced rooming in practices, continued skin to skin, and quiet hours as requested by mother.  Encouraged mother to download [employer] mobile vera if she has not already done so. Mother verbalizes understanding. al

## 2017-08-22 ENCOUNTER — PROCEDURE VISIT (OUTPATIENT)
Dept: OBSTETRICS AND GYNECOLOGY | Facility: CLINIC | Age: 22
End: 2017-08-22
Payer: MEDICAID

## 2017-08-22 ENCOUNTER — ROUTINE PRENATAL (OUTPATIENT)
Dept: OBSTETRICS AND GYNECOLOGY | Facility: CLINIC | Age: 22
End: 2017-08-22
Payer: MEDICAID

## 2017-08-22 VITALS — DIASTOLIC BLOOD PRESSURE: 60 MMHG | WEIGHT: 143.5 LBS | SYSTOLIC BLOOD PRESSURE: 100 MMHG | BODY MASS INDEX: 25.42 KG/M2

## 2017-08-22 DIAGNOSIS — Z36.89 ENCOUNTER FOR FETAL ANATOMIC SURVEY: ICD-10-CM

## 2017-08-22 DIAGNOSIS — Z3A.31 31 WEEKS GESTATION OF PREGNANCY: ICD-10-CM

## 2017-08-22 PROCEDURE — 99999 PR PBB SHADOW E&M-EST. PATIENT-LVL III: CPT | Mod: PBBFAC,,, | Performed by: ADVANCED PRACTICE MIDWIFE

## 2017-08-22 PROCEDURE — 76805 OB US >/= 14 WKS SNGL FETUS: CPT | Mod: 26,S$PBB,, | Performed by: OBSTETRICS & GYNECOLOGY

## 2017-08-22 PROCEDURE — 99213 OFFICE O/P EST LOW 20 MIN: CPT | Mod: TH,S$PBB,25, | Performed by: ADVANCED PRACTICE MIDWIFE

## 2017-08-22 PROCEDURE — 3008F BODY MASS INDEX DOCD: CPT | Mod: ,,, | Performed by: ADVANCED PRACTICE MIDWIFE

## 2017-08-22 RX ORDER — NITROFURANTOIN 25; 75 MG/1; MG/1
100 CAPSULE ORAL
Status: ON HOLD | COMMUNITY
End: 2017-08-29 | Stop reason: HOSPADM

## 2017-08-23 ENCOUNTER — ANESTHESIA (OUTPATIENT)
Dept: OBSTETRICS AND GYNECOLOGY | Facility: HOSPITAL | Age: 22
End: 2017-08-23
Payer: MEDICAID

## 2017-08-23 ENCOUNTER — HOSPITAL ENCOUNTER (INPATIENT)
Facility: HOSPITAL | Age: 22
LOS: 6 days | Discharge: HOME | End: 2017-08-29
Attending: OBSTETRICS & GYNECOLOGY | Admitting: OBSTETRICS & GYNECOLOGY
Payer: MEDICAID

## 2017-08-23 ENCOUNTER — ANESTHESIA EVENT (OUTPATIENT)
Dept: OBSTETRICS AND GYNECOLOGY | Facility: HOSPITAL | Age: 22
End: 2017-08-23
Payer: MEDICAID

## 2017-08-23 DIAGNOSIS — O42.90 PREMATURE RUPTURE OF MEMBRANES: ICD-10-CM

## 2017-08-23 LAB
ABO + RH BLD: NORMAL
BASOPHILS # BLD AUTO: ABNORMAL K/UL
BASOPHILS NFR BLD: 0 %
BILIRUB UR QL STRIP: NEGATIVE
BLD GP AB SCN CELLS X3 SERPL QL: NORMAL
CLARITY UR: CLEAR
COLOR UR: YELLOW
DIFFERENTIAL METHOD: ABNORMAL
EOSINOPHIL # BLD AUTO: ABNORMAL K/UL
EOSINOPHIL NFR BLD: 2 %
ERYTHROCYTE [DISTWIDTH] IN BLOOD BY AUTOMATED COUNT: 12.6 %
GLUCOSE UR QL STRIP: NEGATIVE
HCT VFR BLD AUTO: 28 %
HGB BLD-MCNC: 9.5 G/DL
HGB UR QL STRIP: NEGATIVE
KETONES UR QL STRIP: ABNORMAL
LEUKOCYTE ESTERASE UR QL STRIP: NEGATIVE
LYMPHOCYTES # BLD AUTO: ABNORMAL K/UL
LYMPHOCYTES NFR BLD: 11 %
MCH RBC QN AUTO: 31.1 PG
MCHC RBC AUTO-ENTMCNC: 33.9 G/DL
MCV RBC AUTO: 92 FL
MONOCYTES # BLD AUTO: ABNORMAL K/UL
MONOCYTES NFR BLD: 2 %
NEUTROPHILS NFR BLD: 80 %
NEUTS BAND NFR BLD MANUAL: 5 %
NITRITE UR QL STRIP: NEGATIVE
PH UR STRIP: 6 [PH] (ref 5–8)
PLATELET # BLD AUTO: 173 K/UL
PLATELET BLD QL SMEAR: ABNORMAL
PMV BLD AUTO: 10.6 FL
PROT UR QL STRIP: NEGATIVE
RBC # BLD AUTO: 3.05 M/UL
SMUDGE CELLS BLD QL SMEAR: PRESENT
SP GR UR STRIP: 1.02 (ref 1–1.03)
URN SPEC COLLECT METH UR: ABNORMAL
UROBILINOGEN UR STRIP-ACNC: NEGATIVE EU/DL
WBC # BLD AUTO: 16.8 K/UL

## 2017-08-23 PROCEDURE — 63600175 PHARM REV CODE 636 W HCPCS: Performed by: ADVANCED PRACTICE MIDWIFE

## 2017-08-23 PROCEDURE — 11000001 HC ACUTE MED/SURG PRIVATE ROOM

## 2017-08-23 PROCEDURE — 87081 CULTURE SCREEN ONLY: CPT

## 2017-08-23 PROCEDURE — 25000003 PHARM REV CODE 250: Performed by: ADVANCED PRACTICE MIDWIFE

## 2017-08-23 PROCEDURE — 85027 COMPLETE CBC AUTOMATED: CPT

## 2017-08-23 PROCEDURE — 85007 BL SMEAR W/DIFF WBC COUNT: CPT

## 2017-08-23 PROCEDURE — 81003 URINALYSIS AUTO W/O SCOPE: CPT

## 2017-08-23 PROCEDURE — 87086 URINE CULTURE/COLONY COUNT: CPT

## 2017-08-23 PROCEDURE — 86850 RBC ANTIBODY SCREEN: CPT

## 2017-08-23 PROCEDURE — 87591 N.GONORRHOEAE DNA AMP PROB: CPT

## 2017-08-23 PROCEDURE — 59025 FETAL NON-STRESS TEST: CPT

## 2017-08-23 PROCEDURE — 86900 BLOOD TYPING SEROLOGIC ABO: CPT

## 2017-08-23 PROCEDURE — 51702 INSERT TEMP BLADDER CATH: CPT

## 2017-08-23 PROCEDURE — 72100002 HC LABOR CARE, 1ST 8 HOURS

## 2017-08-23 RX ORDER — MAGNESIUM SULFATE HEPTAHYDRATE 40 MG/ML
2 INJECTION, SOLUTION INTRAVENOUS ONCE
Status: COMPLETED | OUTPATIENT
Start: 2017-08-23 | End: 2017-08-23

## 2017-08-23 RX ORDER — AZITHROMYCIN 250 MG/1
1000 TABLET, FILM COATED ORAL ONCE
Status: COMPLETED | OUTPATIENT
Start: 2017-08-23 | End: 2017-08-23

## 2017-08-23 RX ORDER — SODIUM CHLORIDE 0.9 % (FLUSH) 0.9 %
3 SYRINGE (ML) INJECTION EVERY 8 HOURS
Status: DISCONTINUED | OUTPATIENT
Start: 2017-08-23 | End: 2017-08-27

## 2017-08-23 RX ORDER — MAGNESIUM SULFATE HEPTAHYDRATE 40 MG/ML
2 INJECTION, SOLUTION INTRAVENOUS CONTINUOUS
Status: DISCONTINUED | OUTPATIENT
Start: 2017-08-23 | End: 2017-08-25

## 2017-08-23 RX ORDER — AMOXICILLIN 500 MG/1
500 CAPSULE ORAL EVERY 8 HOURS
Status: DISCONTINUED | OUTPATIENT
Start: 2017-08-25 | End: 2017-08-27

## 2017-08-23 RX ORDER — CALCIUM GLUCONATE 98 MG/ML
1 INJECTION, SOLUTION INTRAVENOUS
Status: DISCONTINUED | OUTPATIENT
Start: 2017-08-23 | End: 2017-08-25

## 2017-08-23 RX ORDER — BETAMETHASONE SODIUM PHOSPHATE AND BETAMETHASONE ACETATE 3; 3 MG/ML; MG/ML
12 INJECTION, SUSPENSION INTRA-ARTICULAR; INTRALESIONAL; INTRAMUSCULAR; SOFT TISSUE EVERY 24 HOURS
Status: DISCONTINUED | OUTPATIENT
Start: 2017-08-24 | End: 2017-08-24

## 2017-08-23 RX ORDER — SODIUM CHLORIDE, SODIUM LACTATE, POTASSIUM CHLORIDE, CALCIUM CHLORIDE 600; 310; 30; 20 MG/100ML; MG/100ML; MG/100ML; MG/100ML
1000 INJECTION, SOLUTION INTRAVENOUS CONTINUOUS
Status: ACTIVE | OUTPATIENT
Start: 2017-08-23 | End: 2017-08-24

## 2017-08-23 RX ADMIN — MAGNESIUM SULFATE IN WATER 2 G/HR: 40 INJECTION, SOLUTION INTRAVENOUS at 08:08

## 2017-08-23 RX ADMIN — MAGNESIUM SULFATE IN WATER 2 G: 40 INJECTION, SOLUTION INTRAVENOUS at 08:08

## 2017-08-23 RX ADMIN — BETAMETHASONE SODIUM PHOSPHATE AND BETAMETHASONE ACETATE 12 MG: 3; 3 INJECTION, SUSPENSION INTRA-ARTICULAR; INTRALESIONAL; INTRAMUSCULAR at 07:08

## 2017-08-23 RX ADMIN — SODIUM CHLORIDE, SODIUM LACTATE, POTASSIUM CHLORIDE, AND CALCIUM CHLORIDE 1000 ML: .6; .31; .03; .02 INJECTION, SOLUTION INTRAVENOUS at 09:08

## 2017-08-23 RX ADMIN — AMPICILLIN 2 G: 2 INJECTION, POWDER, FOR SOLUTION INTRAVENOUS at 06:08

## 2017-08-23 RX ADMIN — AZITHROMYCIN 1000 MG: 250 TABLET, FILM COATED ORAL at 06:08

## 2017-08-23 NOTE — H&P
[facility] -   Obstetrics  History & Physical    Patient Name: Kristina Khoury  MRN: [id number]  Admission Date: 2017  Primary Care Provider: NORMA Lane    Subjective:     Principal Problem:Premature rupture of membranes  History of Present Illness:  Pt presents complaining of SROM at 1630    Obstetric HPI:  Patient reports complaining of SROM at 1630, active fetal movement, No vaginal bleeding , Yes loss of fluid.  Denies uc's.     This pregnancy has been complicated by insufficient care, UTI, suppressive therapy    Obstetric History       T1      L1     SAB0   TAB0   Ectopic0   Multiple0   Live Births1       # Outcome Date GA Lbr Naseem/2nd Weight Sex Delivery Anes PTL Lv   2 Current            1 Term 14 40w0d  3.43 kg (7 lb 9 oz) M Vag-Spont   DAKOTA        Past Medical History:   Diagnosis Date    ADD (attention deficit disorder)     OCD (obsessive compulsive disorder)     Vaginal laceration 2014     No past surgical history on file.    PTA Medications   Medication Sig    nitrofurantoin, macrocrystal-monohydrate, (MACROBID) 100 MG capsule Take 100 mg by mouth every 12 (twelve) hours.    PNV62/FA/OM3/DHA/EPA/FISH OIL (PRENATAL GUMMY ORAL) Take by mouth once daily.    promethazine (PHENERGAN) 25 MG tablet Take 0.5 tablets (12.5 mg total) by mouth every 6 (six) hours as needed for Nausea.       Review of patient's allergies indicates:  No Known Allergies     Family History     Problem Relation (Age of Onset)    Diabetes Paternal Grandmother, Maternal Grandmother    Hypertension Paternal Grandmother, Maternal Grandmother        Social History Main Topics    Smoking status: Current Every Day Smoker     Packs/day: 0.25    Smokeless tobacco: Never Used      Comment: 5-6 cigarettes daily     Alcohol use No    Drug use: No    Sexual activity: Yes     Partners: Male     Review of Systems   Constitutional: Negative.    Respiratory: Negative.    Gastrointestinal:  Negative.    Endocrine: Negative.    Skin:  Negative.   Neurological: Negative.       Objective:     Vital Signs (Most Recent):    Vital Signs (24h Range):           There is no height or weight on file to calculate BMI.    FHT: 150's Cat 1 (reassuring)  TOCO:  Q 2-5 minutes    Physical Exam:   Constitutional: She is oriented to person, place, and time. She appears well-developed and well-nourished.      Neck: Normal range of motion.     Pulmonary/Chest: Effort normal.        Abdominal: Soft.     Genitourinary:   Genitourinary Comments: + nitrazene  Grossly ruptured           Musculoskeletal: Normal range of motion.       Neurological: She is alert and oriented to person, place, and time.    Skin: Skin is dry.    Psychiatric: She has a normal mood and affect.       Cervix:  Dilation:  3  Effacement:  75%  Station: -2  Presentation: Vertex     Significant Labs:  Lab Results   Component Value Date    GROUPTRH A POS 2017    HEPBSAG Negative 2017    STREPBCULT No Group B Streptococcus isolated 2014       I have personallly reviewed all pertinent lab results from the last 24 hours.    Assessment/Plan:     22 y.o. female  at 31w4d for:    Premature rupture of membranes    + PROM   standing orders initiated  Dr Jones notified of admission              Charlene Baumann CNM  Obstetrics  [facility] -

## 2017-08-23 NOTE — HOSPITAL COURSE
Patient admitted at 31 weeks EGA for PPROM.  S/P BMZ series.  PPROM latency antibiotics given.  17 @ 630a-pt c/o uterine contractions. Has been on monitor all last night. Reports worse over past few hours. (+) labor. Progressed to .  17 1st day S/P  - Baby NICU- 32 weeks- Pumping  17 2nd day pp baby remains in NICU, still pumping

## 2017-08-23 NOTE — PROGRESS NOTES
Doing well  Anatomy scan suboptimal  PTL talk  Questions answered    Coffective counseling sheet Learn Your Baby discussed with mother. Instructed regarding feeding cues and methods to calm baby. Encouraged mother to download Coffective mobile vera if she has not already done so.  Mother verbalized understanding.

## 2017-08-23 NOTE — SUBJECTIVE & OBJECTIVE
Obstetric HPI:  Patient reports complaining of SROM at 1630, active fetal movement, No vaginal bleeding , Yes loss of fluid.  Denies uc's.     This pregnancy has been complicated by insufficient care, UTI, suppressive therapy    Obstetric History       T1      L1     SAB0   TAB0   Ectopic0   Multiple0   Live Births1       # Outcome Date GA Lbr Naseem/2nd Weight Sex Delivery Anes PTL Lv   2 Current            1 Term 14 40w0d  3.43 kg (7 lb 9 oz) M Vag-Spont   DAKOTA        Past Medical History:   Diagnosis Date    ADD (attention deficit disorder)     OCD (obsessive compulsive disorder)     Vaginal laceration 2014     No past surgical history on file.    PTA Medications   Medication Sig    nitrofurantoin, macrocrystal-monohydrate, (MACROBID) 100 MG capsule Take 100 mg by mouth every 12 (twelve) hours.    PNV62/FA/OM3/DHA/EPA/FISH OIL (PRENATAL GUMMY ORAL) Take by mouth once daily.    promethazine (PHENERGAN) 25 MG tablet Take 0.5 tablets (12.5 mg total) by mouth every 6 (six) hours as needed for Nausea.       Review of patient's allergies indicates:  No Known Allergies     Family History     Problem Relation (Age of Onset)    Diabetes Paternal Grandmother, Maternal Grandmother    Hypertension Paternal Grandmother, Maternal Grandmother        Social History Main Topics    Smoking status: Current Every Day Smoker     Packs/day: 0.25    Smokeless tobacco: Never Used      Comment: 5-6 cigarettes daily     Alcohol use No    Drug use: No    Sexual activity: Yes     Partners: Male     Review of Systems   Constitutional: Negative.    Respiratory: Negative.    Gastrointestinal: Negative.    Endocrine: Negative.    Skin:  Negative.   Neurological: Negative.       Objective:     Vital Signs (Most Recent):    Vital Signs (24h Range):           There is no height or weight on file to calculate BMI.    FHT: 150's Cat 1 (reassuring)  TOCO:  Q 2-5 minutes    Physical Exam:   Constitutional: She is  oriented to person, place, and time. She appears well-developed and well-nourished.      Neck: Normal range of motion.     Pulmonary/Chest: Effort normal.        Abdominal: Soft.     Genitourinary:   Genitourinary Comments: + nitrazene  Grossly ruptured           Musculoskeletal: Normal range of motion.       Neurological: She is alert and oriented to person, place, and time.    Skin: Skin is dry.    Psychiatric: She has a normal mood and affect.       Cervix:  Dilation:  3  Effacement:  75%  Station: -2  Presentation: Vertex     Significant Labs:  Lab Results   Component Value Date    GROUPTRH A POS 03/28/2017    HEPBSAG Negative 03/28/2017    STREPBCULT No Group B Streptococcus isolated 11/19/2014       I have personallly reviewed all pertinent lab results from the last 24 hours.

## 2017-08-24 LAB
C TRACH DNA SPEC QL NAA+PROBE: NOT DETECTED
MAGNESIUM SERPL-MCNC: 4.2 MG/DL
N GONORRHOEA DNA SPEC QL NAA+PROBE: NOT DETECTED

## 2017-08-24 PROCEDURE — 11000001 HC ACUTE MED/SURG PRIVATE ROOM

## 2017-08-24 PROCEDURE — 25000003 PHARM REV CODE 250: Performed by: ADVANCED PRACTICE MIDWIFE

## 2017-08-24 PROCEDURE — 72100003 HC LABOR CARE, EA. ADDL. 8 HRS

## 2017-08-24 PROCEDURE — 63600175 PHARM REV CODE 636 W HCPCS: Performed by: ADVANCED PRACTICE MIDWIFE

## 2017-08-24 PROCEDURE — 83735 ASSAY OF MAGNESIUM: CPT

## 2017-08-24 RX ORDER — SODIUM CHLORIDE, SODIUM LACTATE, POTASSIUM CHLORIDE, CALCIUM CHLORIDE 600; 310; 30; 20 MG/100ML; MG/100ML; MG/100ML; MG/100ML
INJECTION, SOLUTION INTRAVENOUS CONTINUOUS
Status: DISCONTINUED | OUTPATIENT
Start: 2017-08-24 | End: 2017-08-25

## 2017-08-24 RX ORDER — BETAMETHASONE SODIUM PHOSPHATE AND BETAMETHASONE ACETATE 3; 3 MG/ML; MG/ML
12 INJECTION, SUSPENSION INTRA-ARTICULAR; INTRALESIONAL; INTRAMUSCULAR; SOFT TISSUE ONCE
Status: COMPLETED | OUTPATIENT
Start: 2017-08-24 | End: 2017-08-24

## 2017-08-24 RX ORDER — BETAMETHASONE SODIUM PHOSPHATE AND BETAMETHASONE ACETATE 3; 3 MG/ML; MG/ML
12 INJECTION, SUSPENSION INTRA-ARTICULAR; INTRALESIONAL; INTRAMUSCULAR; SOFT TISSUE EVERY 24 HOURS
Status: DISCONTINUED | OUTPATIENT
Start: 2017-08-23 | End: 2017-08-24

## 2017-08-24 RX ADMIN — AMPICILLIN 2 G: 2 INJECTION, POWDER, FOR SOLUTION INTRAVENOUS at 06:08

## 2017-08-24 RX ADMIN — BETAMETHASONE SODIUM PHOSPHATE AND BETAMETHASONE ACETATE 12 MG: 3; 3 INJECTION, SUSPENSION INTRA-ARTICULAR; INTRALESIONAL; INTRAMUSCULAR at 07:08

## 2017-08-24 RX ADMIN — AMPICILLIN 2 G: 2 INJECTION, POWDER, FOR SOLUTION INTRAVENOUS at 12:08

## 2017-08-24 RX ADMIN — SODIUM CHLORIDE, SODIUM LACTATE, POTASSIUM CHLORIDE, AND CALCIUM CHLORIDE 1000 ML: .6; .31; .03; .02 INJECTION, SOLUTION INTRAVENOUS at 05:08

## 2017-08-24 NOTE — PROGRESS NOTES
Patient c/o light headedness, chest heavy and left arm hurting. MAgnesium stopped. CNM notified and stat Magnesium lab ordered.

## 2017-08-24 NOTE — PLAN OF CARE
Problem: Patient Care Overview  Goal: Individualization & Mutuality  Outcome: Ongoing (interventions implemented as appropriate)  Patient doing well;  at bedside; VS stable; afebrile; moderate leakage of fluids noted;

## 2017-08-24 NOTE — ANESTHESIA PREPROCEDURE EVALUATION
08/23/2017 April Pearl Khoury is a 22 y.o., female.    Anesthesia Evaluation    I have reviewed the Patient Summary Reports.    I have reviewed the Nursing Notes.   I have reviewed the Medications.     Review of Systems  Anesthesia Hx:  No problems with previous Anesthesia  History of prior surgery of interest to airway management or planning: Previous anesthesia: General PE tubes childhood with general anesthesia.  Denies Family Hx of Anesthesia complications.   Denies Personal Hx of Anesthesia complications.   Social:  Smoker  Tobacco Use: Active smoker of cigarette   Hematology/Oncology:  Hematology Normal   Oncology Normal     EENT/Dental:EENT/Dental Normal   Cardiovascular:  Cardiovascular Normal     Pulmonary:  Pulmonary Normal    Renal/:  Renal/ Normal     Hepatic/GI:  Hepatic/GI Normal    Musculoskeletal:  Musculoskeletal Normal    Neurological:   Scoliosis  Sciatica   Psych:   ADD/OCD Attention Deficit Disorder. Obsessive Compulsive Disorder.         Physical Exam  General:  Well nourished    Airway/Jaw/Neck:  Airway Findings: Mouth Opening: Small, but > 3cm Tongue: Normal, Piercing  General Airway Assessment: Adult  Mallampati: II  Improves to II with phonation.  TM Distance: Normal, at least 6 cm      Dental:  Dental Findings: In tact        Mental Status:  Mental Status Findings:  Cooperative         Anesthesia Plan  Type of Anesthesia, risks & benefits discussed:  Anesthesia Type:  epidural  Patient's Preference:   Intra-op Monitoring Plan:   Intra-op Monitoring Plan Comments:   Post Op Pain Control Plan:   Post Op Pain Control Plan Comments:   Induction:    Beta Blocker:  Patient is not currently on a Beta-Blocker (No further documentation required).       Informed Consent: Patient understands risks and agrees with Anesthesia plan.  Questions answered. Anesthesia consent signed with  patient.  ASA Score: 2     Day of Surgery Review of History & Physical: I have interviewed and examined the patient. I have reviewed the patient's H&P dated:  There are no significant changes.          Ready For Surgery From Anesthesia Perspective.

## 2017-08-24 NOTE — PROGRESS NOTES
Speculum exam done. Pooling of large amount of clear fluid  GC and GBS collected  Cervix 3-4, 70%, -2  Mag order placed. UC's q 2-4 min  U/S ordered

## 2017-08-24 NOTE — PROGRESS NOTES
S: Resting quietly    O:  VS reviewed, afebrile  FHTs 130's, reassuring  UC's q 10-12 min  SVE      A: RODRIGO @ 31w5d    P: Continue care

## 2017-08-25 LAB — BACTERIA UR CULT: NO GROWTH

## 2017-08-25 PROCEDURE — A4216 STERILE WATER/SALINE, 10 ML: HCPCS | Performed by: ADVANCED PRACTICE MIDWIFE

## 2017-08-25 PROCEDURE — 25000003 PHARM REV CODE 250: Performed by: ADVANCED PRACTICE MIDWIFE

## 2017-08-25 PROCEDURE — 72100003 HC LABOR CARE, EA. ADDL. 8 HRS

## 2017-08-25 PROCEDURE — 99232 SBSQ HOSP IP/OBS MODERATE 35: CPT | Mod: ,,, | Performed by: OBSTETRICS & GYNECOLOGY

## 2017-08-25 PROCEDURE — 11000001 HC ACUTE MED/SURG PRIVATE ROOM

## 2017-08-25 PROCEDURE — 63600175 PHARM REV CODE 636 W HCPCS: Performed by: ADVANCED PRACTICE MIDWIFE

## 2017-08-25 RX ORDER — MAG HYDROX/ALUMINUM HYD/SIMETH 200-200-20
30 SUSPENSION, ORAL (FINAL DOSE FORM) ORAL
Status: DISCONTINUED | OUTPATIENT
Start: 2017-08-25 | End: 2017-08-27

## 2017-08-25 RX ADMIN — AMOXICILLIN 500 MG: 500 CAPSULE ORAL at 06:08

## 2017-08-25 RX ADMIN — ALUMINUM HYDROXIDE, MAGNESIUM HYDROXIDE, AND SIMETHICONE 30 ML: 200; 200; 20 SUSPENSION ORAL at 05:08

## 2017-08-25 RX ADMIN — AMPICILLIN 2 G: 2 INJECTION, POWDER, FOR SOLUTION INTRAVENOUS at 01:08

## 2017-08-25 RX ADMIN — AMOXICILLIN 500 MG: 500 CAPSULE ORAL at 03:08

## 2017-08-25 RX ADMIN — AMPICILLIN 2 G: 2 INJECTION, POWDER, FOR SOLUTION INTRAVENOUS at 06:08

## 2017-08-25 RX ADMIN — SODIUM CHLORIDE, PRESERVATIVE FREE 3 ML: 5 INJECTION INTRAVENOUS at 09:08

## 2017-08-25 RX ADMIN — AMOXICILLIN 500 MG: 500 CAPSULE ORAL at 11:08

## 2017-08-25 RX ADMIN — AMPICILLIN 2 G: 2 INJECTION, POWDER, FOR SOLUTION INTRAVENOUS at 12:08

## 2017-08-25 NOTE — PROGRESS NOTES
[facility] -   Obstetrics  Antepartum Progress Note    Patient Name: Kirstina Khoury  MRN: [id number]  Admission Date: 2017  Hospital Length of Stay: 2 days  Attending Physician: Linda Jones MD  Primary Care Provider: NORMA Lane    Subjective:     Principal Problem:<principal problem not specified>    HPI:  Pt presents complaining of SROM at 1630    Hospital Course:  Patient admitted at 31 weeks EGA for PPROM.  S/P BMZ series.  PPROM latency antibiotics given.    Obstetric HPI:  Patient reports None contractions, active fetal movement, absent vaginal bleeding , no loss of fluid      Objective:     Vital Signs (Most Recent):  Temp: 98.5 °F (36.9 °C) (17)  Pulse: 76 (17)  Resp: 20 (17)  BP: (!) 104/44 (17)  SpO2: 100 % (17) Vital Signs (24h Range):  Temp:  [98.5 °F (36.9 °C)-99 °F (37.2 °C)] 98.5 °F (36.9 °C)  Pulse:  [] 76  Resp:  [16-20] 20  SpO2:  [100 %] 100 %  BP: ()/(34-66) 104/44     Weight: 64.9 kg (143 lb)  Body mass index is 25.33 kg/m².    FHT:     Cat 1 (reassuring)  TOCO:  None      Intake/Output Summary (Last 24 hours) at 17 184  Last data filed at 17 1515   Gross per 24 hour   Intake              175 ml   Output             1385 ml   Net            -1210 ml            Significant Labs:  Recent Lab Results     None          Physical Exam:   Constitutional: She is oriented to person, place, and time. No distress.    HENT:   Head: Normocephalic and atraumatic.     Neck: Neck supple.    Cardiovascular: Normal rate.     Pulmonary/Chest: Effort normal.        Abdominal: Soft. There is no tenderness.             Musculoskeletal: She exhibits no edema or tenderness.       Neurological: She is alert and oriented to person, place, and time.     Psychiatric: She has a normal mood and affect.       Assessment/Plan:     22 y.o. female  at 31w6d for:    Premature rupture of membranes    Patient  stable.  Fetal monitoring is reassuring.  Continue expectant management for now.  NST twice daily.                Helen Sena MD  Obstetrics  [facility]

## 2017-08-25 NOTE — ASSESSMENT & PLAN NOTE
Patient stable.  Fetal monitoring is reassuring.  Continue expectant management for now.  NST twice daily.

## 2017-08-25 NOTE — CONSULTS
Neonatology Consultation 2017    Patient Name:  RAMAKRISHNA WISEMAN  Account #:  [id number]  MRN:  [id number]  Gender:  Female  YOB: 1995 12:00 AM    Date/Time of Consult:  2017 09:22 AM  Referring Physician:    Chief Complaint:      MATERNAL HISTORY  Prenatal Care:  Yes  EDC:       /Parity:   0 Parity 0 Term 0 Premature 0  0 Living   Children 0     Pregnancy Complications:   Attention-deficit hyperactivity disorder, unspecified   type, Encounter for childcare instruction, Obsessive-Compulsive, Premature   rupture of membranes, Urinary tract infection    Pregnancy Complication Comments:      Labor Complications:    Labor Medications:  -Macrobid  -ampicillin  -betamethasone acet,sod phos  -magnesium sulfate  -Prenatal Vitamin  -erythromycin    DIAGNOSES  1. Encounter for childcare instruction (Z32.3)  Onset:2017  Comments:  Consent obtained from mother for  therapy.  Overall  mortality   and morbidity due to maternal/fetal/ conditions were discussed.    Anticipated  therapy with the risks/benefits from the following   categories were discussed if clinically indicated:  (1)  resuscitation   (intubation, placement of arterial and venous catheters), (2) cardiac   resuscitation, (3) surfactant therapy (4) antibiotic therapy (5)  blood   transfusions (6) assisted ventilation (7) oxygen therapy, (8) provision of   neutral thermal environment (9) peripheral IV therapy - both fluids and   medications (10) phototherapy and (11) vasopressor therapy.  We also discussed   long term risks for neurodevelopment impairment (low) and survival (high, but   small risk for mortality).  Questions answered as appropriate.  Mother consented   to therapy.    Attending:BEST: Thad Munoz MD 2017 9:28 AM

## 2017-08-25 NOTE — NURSING
Neonatologist at bedside for consult.   Explained to pt that she will be NPO at this time due to decels of fetus. Pt verbalized understanding.

## 2017-08-25 NOTE — SUBJECTIVE & OBJECTIVE
Obstetric HPI:  Patient reports None contractions, active fetal movement, absent vaginal bleeding , no loss of fluid      Objective:     Vital Signs (Most Recent):  Temp: 98.5 °F (36.9 °C) (08/25/17 1747)  Pulse: 76 (08/25/17 1747)  Resp: 20 (08/25/17 1747)  BP: (!) 104/44 (08/25/17 1747)  SpO2: 100 % (08/24/17 1843) Vital Signs (24h Range):  Temp:  [98.5 °F (36.9 °C)-99 °F (37.2 °C)] 98.5 °F (36.9 °C)  Pulse:  [] 76  Resp:  [16-20] 20  SpO2:  [100 %] 100 %  BP: ()/(34-66) 104/44     Weight: 64.9 kg (143 lb)  Body mass index is 25.33 kg/m².    FHT:     Cat 1 (reassuring)  TOCO:  None      Intake/Output Summary (Last 24 hours) at 08/25/17 1841  Last data filed at 08/25/17 1515   Gross per 24 hour   Intake              175 ml   Output             1385 ml   Net            -1210 ml            Significant Labs:  Recent Lab Results     None          Physical Exam:   Constitutional: She is oriented to person, place, and time. No distress.    HENT:   Head: Normocephalic and atraumatic.     Neck: Neck supple.    Cardiovascular: Normal rate.     Pulmonary/Chest: Effort normal.        Abdominal: Soft. There is no tenderness.             Musculoskeletal: She exhibits no edema or tenderness.       Neurological: She is alert and oriented to person, place, and time.     Psychiatric: She has a normal mood and affect.

## 2017-08-26 LAB — BACTERIA SPEC AEROBE CULT: NORMAL

## 2017-08-26 PROCEDURE — 99232 SBSQ HOSP IP/OBS MODERATE 35: CPT | Mod: ,,, | Performed by: OBSTETRICS & GYNECOLOGY

## 2017-08-26 PROCEDURE — A4216 STERILE WATER/SALINE, 10 ML: HCPCS | Performed by: ADVANCED PRACTICE MIDWIFE

## 2017-08-26 PROCEDURE — 25000003 PHARM REV CODE 250: Performed by: ADVANCED PRACTICE MIDWIFE

## 2017-08-26 PROCEDURE — 11000001 HC ACUTE MED/SURG PRIVATE ROOM

## 2017-08-26 RX ADMIN — SODIUM CHLORIDE, PRESERVATIVE FREE 3 ML: 5 INJECTION INTRAVENOUS at 02:08

## 2017-08-26 RX ADMIN — AMOXICILLIN 500 MG: 500 CAPSULE ORAL at 09:08

## 2017-08-26 RX ADMIN — SODIUM CHLORIDE, PRESERVATIVE FREE 3 ML: 5 INJECTION INTRAVENOUS at 09:08

## 2017-08-26 RX ADMIN — SODIUM CHLORIDE, PRESERVATIVE FREE 3 ML: 5 INJECTION INTRAVENOUS at 06:08

## 2017-08-26 RX ADMIN — AMOXICILLIN 500 MG: 500 CAPSULE ORAL at 02:08

## 2017-08-26 NOTE — PROGRESS NOTES
[facility]  Obstetrics  Antepartum Progress Note    Patient Name: Kristina Khoury  MRN: [id number]  Admission Date: 8/23/2017  Hospital Length of Stay: 3 days  Attending Physician: Linda Jones MD  Primary Care Provider: NORMA Lane    Subjective:     Principal Problem:<principal problem not specified>    HPI:  Pt presents complaining of SROM at 1630    Hospital Course:  Patient admitted at 31 weeks EGA for PPROM.  S/P BMZ series.  PPROM latency antibiotics given.    Obstetric HPI:  Patient reports None contractions, active fetal movement, absent vaginal bleeding , present loss of fluid      Objective:     Vital Signs (Most Recent):  Temp: 98.2 °F (36.8 °C) (08/26/17 0000)  Pulse: 78 (08/26/17 0410)  Resp: 18 (08/26/17 0000)  BP: (!) 86/52 (states she feels fine;asymptomatic after walking to bathroom) (08/26/17 0410)  SpO2: 100 % (08/24/17 1843) Vital Signs (24h Range):  Temp:  [98.2 °F (36.8 °C)-98.7 °F (37.1 °C)] 98.2 °F (36.8 °C)  Pulse:  [] 78  Resp:  [18-20] 18  BP: ()/(34-55) 86/52     Weight: 64.9 kg (143 lb)  Body mass index is 25.33 kg/m².    FHT: reassuring (pt has had episodic fetal decels last pm that resolved)  TOCO:  Q 0 minutes      Intake/Output Summary (Last 24 hours) at 08/26/17 0755  Last data filed at 08/25/17 1515   Gross per 24 hour   Intake                0 ml   Output              650 ml   Net             -650 ml       Significant Labs:  Recent Lab Results     None          Physical Exam:   Constitutional: She is oriented to person, place, and time. No distress.        Pulmonary/Chest: Effort normal.        Abdominal: Soft. There is no tenderness.   Gravid     Genitourinary:   Genitourinary Comments: No bleeding           Musculoskeletal: Moves all extremeties.       Neurological: She is alert and oriented to person, place, and time.    Skin: Skin is warm and dry.    Psychiatric: She has a normal mood and affect. Her behavior is normal.        Assessment/Plan:     22 y.o. female  at 32w0d for:    Premature rupture of membranes    Patient stable.  Fetal monitoring is reassuring.  Continue expectant management for now.  NST twice daily.                Verna Rosa MD  Obstetrics  [facility]

## 2017-08-26 NOTE — PROCEDURES
Continues on BID NST's    NST    8/26/2017  32w0d  Indication for Test:  PPROM      Fetal Heart Rate (PeriCALM)  Fetal Monitor Mode 1: Ultrasound  Baseline FHR 1: 150  Variability 1: Moderate (6-25)  Accels 1: Yes  Decels 1: Late  Category 1: NST reactive

## 2017-08-26 NOTE — PLAN OF CARE
Problem: Patient Care Overview  Goal: Discharge Needs Assessment  Outcome: Ongoing (interventions implemented as appropriate)  The patient was alert and oriented. VSS. The patient complains of no pain. NST was done on this shift and charted by midwife Maria Guadalupe. The patient has bed in low position, call light is within reach. Will continue to monitor the patient.

## 2017-08-26 NOTE — SUBJECTIVE & OBJECTIVE
Obstetric HPI:  Patient reports None contractions, active fetal movement, absent vaginal bleeding , present loss of fluid      Objective:     Vital Signs (Most Recent):  Temp: 98.2 °F (36.8 °C) (08/26/17 0000)  Pulse: 78 (08/26/17 0410)  Resp: 18 (08/26/17 0000)  BP: (!) 86/52 (states she feels fine;asymptomatic after walking to bathroom) (08/26/17 0410)  SpO2: 100 % (08/24/17 1843) Vital Signs (24h Range):  Temp:  [98.2 °F (36.8 °C)-98.7 °F (37.1 °C)] 98.2 °F (36.8 °C)  Pulse:  [] 78  Resp:  [18-20] 18  BP: ()/(34-55) 86/52     Weight: 64.9 kg (143 lb)  Body mass index is 25.33 kg/m².    FHT: reassuring (pt has had episodic fetal decels last pm that resolved)  TOCO:  Q 0 minutes      Intake/Output Summary (Last 24 hours) at 08/26/17 0755  Last data filed at 08/25/17 1515   Gross per 24 hour   Intake                0 ml   Output              650 ml   Net             -650 ml       Significant Labs:  Recent Lab Results     None          Physical Exam:   Constitutional: She is oriented to person, place, and time. No distress.        Pulmonary/Chest: Effort normal.        Abdominal: Soft. There is no tenderness.   Gravid     Genitourinary:   Genitourinary Comments: No bleeding           Musculoskeletal: Moves all extremeties.       Neurological: She is alert and oriented to person, place, and time.    Skin: Skin is warm and dry.    Psychiatric: She has a normal mood and affect. Her behavior is normal.

## 2017-08-26 NOTE — PLAN OF CARE
Problem: Patient Care Overview  Goal: Plan of Care Review  Outcome: Ongoing (interventions implemented as appropriate)  Pt doing well. Denies contractions or bleeding. Taking scheduled Amoxicillin. NST BID to begin today for fetal monitoring.

## 2017-08-26 NOTE — PROGRESS NOTES
[facility]  Obstetrics  Antepartum Progress Note    Patient Name: Kristina Khoury  MRN: [id number]  Admission Date: 8/23/2017  Hospital Length of Stay: 3 days  Attending Physician: Linda Jones MD  Primary Care Provider: NORMA Lane    Subjective:     Principal Problem:<principal problem not specified>    HPI:  Pt presents complaining of SROM at 1630    Hospital Course:  Patient admitted at 31 weeks EGA for PPROM.  S/P BMZ series.  PPROM latency antibiotics given.      Obstetric HPI:  Patient reports None contractions, active fetal movement, light red vaginal liquid on pad, present loss of fluid    PPROM, antepartum, expectant management continues with BID NST's. Being placed on monitoring now.   Objective:     Vital Signs (Most Recent):  Temp: 98.1 °F (36.7 °C) (08/26/17 0800)  Pulse: 91 (08/26/17 0800)  Resp: 18 (08/26/17 0800)  BP: (!) 81/47 (08/26/17 0800)  SpO2: 100 % (08/24/17 1843) Vital Signs (24h Range):  Temp:  [98.1 °F (36.7 °C)-98.5 °F (36.9 °C)] 98.1 °F (36.7 °C)  Pulse:  [] 91  Resp:  [18-20] 18  BP: ()/(44-55) 81/47     Weight: 64.9 kg (143 lb)  Body mass index is 25.33 kg/m².  FHT: Cat 1 (reassuring) baseline 140, moderate variability with accel noted.   TOCO: acontractile at present.       Intake/Output Summary (Last 24 hours) at 08/26/17 1209  Last data filed at 08/25/17 1515   Gross per 24 hour   Intake                0 ml   Output              400 ml   Net             -400 ml       Cervical Exam:  Deferred as no indication for VE at this time.      Significant Labs:  Recent Lab Results     None          Physical Exam:   Constitutional: She is oriented to person, place, and time. She appears well-developed and well-nourished.    HENT:   Head: Atraumatic.    Eyes: EOM are normal.    Neck: Neck supple.    Cardiovascular: Normal rate, regular rhythm and normal heart sounds.     Pulmonary/Chest: Effort normal and breath sounds normal.        Abdominal: Soft.  Bowel sounds are normal.     Genitourinary: Uterus normal.   Genitourinary Comments: Uterus soft and non-tender.  No noted ctx.   Pink discharge noted in fluid, but no kareen bleeding.              Musculoskeletal: Normal range of motion and moves all extremeties.       Neurological: She is alert and oriented to person, place, and time. She has normal reflexes.    Skin: Skin is warm and dry.    Psychiatric: She has a normal mood and affect.       Assessment/Plan:     22 y.o. female  at 32w0d for:    Premature rupture of membranes    Patient stable.  Fetal monitoring is reassuring.  Continue expectant management for now.  NST twice daily.            Will move to l & d if any increased bleeding noted or s/s of PTL.     Maria Guadalupe León CNM  Obstetrics  [facility]

## 2017-08-26 NOTE — SUBJECTIVE & OBJECTIVE
Obstetric HPI:  Patient reports None contractions, active fetal movement, light red vaginal liquid on pad, present loss of fluid    PPROM, antepartum, expectant management continues with BID NST's. Being placed on monitoring now.   Objective:     Vital Signs (Most Recent):  Temp: 98.1 °F (36.7 °C) (08/26/17 0800)  Pulse: 91 (08/26/17 0800)  Resp: 18 (08/26/17 0800)  BP: (!) 81/47 (08/26/17 0800)  SpO2: 100 % (08/24/17 1843) Vital Signs (24h Range):  Temp:  [98.1 °F (36.7 °C)-98.5 °F (36.9 °C)] 98.1 °F (36.7 °C)  Pulse:  [] 91  Resp:  [18-20] 18  BP: ()/(44-55) 81/47     Weight: 64.9 kg (143 lb)  Body mass index is 25.33 kg/m².  FHT: Cat 1 (reassuring) baseline 140, moderate variability with accel noted.   TOCO: acontractile at present.       Intake/Output Summary (Last 24 hours) at 08/26/17 1209  Last data filed at 08/25/17 1515   Gross per 24 hour   Intake                0 ml   Output              400 ml   Net             -400 ml       Cervical Exam:  Deferred as no indication for VE at this time.      Significant Labs:  Recent Lab Results     None          Physical Exam:   Constitutional: She is oriented to person, place, and time. She appears well-developed and well-nourished.    HENT:   Head: Atraumatic.    Eyes: EOM are normal.    Neck: Neck supple.    Cardiovascular: Normal rate, regular rhythm and normal heart sounds.     Pulmonary/Chest: Effort normal and breath sounds normal.        Abdominal: Soft. Bowel sounds are normal.     Genitourinary: Uterus normal.   Genitourinary Comments: Uterus soft and non-tender.  No noted ctx.   Pink discharge noted in fluid, but no kareen bleeding.              Musculoskeletal: Normal range of motion and moves all extremeties.       Neurological: She is alert and oriented to person, place, and time. She has normal reflexes.    Skin: Skin is warm and dry.    Psychiatric: She has a normal mood and affect.

## 2017-08-27 PROBLEM — O23.43 RECURRENT URINARY TRACT INFECTION AFFECTING PREGNANCY IN THIRD TRIMESTER: Status: RESOLVED | Noted: 2017-07-24 | Resolved: 2017-08-27

## 2017-08-27 PROBLEM — O42.90 PREMATURE RUPTURE OF MEMBRANES: Status: RESOLVED | Noted: 2017-08-23 | Resolved: 2017-08-27

## 2017-08-27 PROBLEM — O60.03 PRETERM LABOR IN THIRD TRIMESTER: Status: ACTIVE | Noted: 2017-08-27

## 2017-08-27 PROBLEM — O09.32 INSUFFICIENT PRENATAL CARE IN SECOND TRIMESTER: Status: RESOLVED | Noted: 2017-07-24 | Resolved: 2017-08-27

## 2017-08-27 PROBLEM — O60.03 PRETERM LABOR IN THIRD TRIMESTER: Status: RESOLVED | Noted: 2017-08-27 | Resolved: 2017-08-27

## 2017-08-27 LAB
ABO + RH BLD: NORMAL
BASOPHILS NFR BLD: 0 %
BLD GP AB SCN CELLS X3 SERPL QL: NORMAL
DIFFERENTIAL METHOD: ABNORMAL
EOSINOPHIL NFR BLD: 0 %
ERYTHROCYTE [DISTWIDTH] IN BLOOD BY AUTOMATED COUNT: 12.8 %
HCT VFR BLD AUTO: 27.8 %
HGB BLD-MCNC: 9.4 G/DL
LYMPHOCYTES NFR BLD: 14 %
MCH RBC QN AUTO: 31.3 PG
MCHC RBC AUTO-ENTMCNC: 33.8 G/DL
MCV RBC AUTO: 93 FL
MONOCYTES NFR BLD: 2 %
NEUTROPHILS NFR BLD: 81 %
NEUTS BAND NFR BLD MANUAL: 3 %
PLATELET # BLD AUTO: 189 K/UL
PLATELET BLD QL SMEAR: ABNORMAL
PMV BLD AUTO: 10.2 FL
RBC # BLD AUTO: 3 M/UL
WBC # BLD AUTO: 22.18 K/UL

## 2017-08-27 PROCEDURE — 72100003 HC LABOR CARE, EA. ADDL. 8 HRS

## 2017-08-27 PROCEDURE — 63600175 PHARM REV CODE 636 W HCPCS: Performed by: NURSE ANESTHETIST, CERTIFIED REGISTERED

## 2017-08-27 PROCEDURE — A4216 STERILE WATER/SALINE, 10 ML: HCPCS | Performed by: ADVANCED PRACTICE MIDWIFE

## 2017-08-27 PROCEDURE — 72200004 HC VAGINAL DELIVERY LEVEL I

## 2017-08-27 PROCEDURE — 59409 OBSTETRICAL CARE: CPT | Mod: AT,,, | Performed by: MIDWIFE

## 2017-08-27 PROCEDURE — 27800517 HC TRAY,EPIDURAL-CONTINUOUS: Performed by: NURSE ANESTHETIST, CERTIFIED REGISTERED

## 2017-08-27 PROCEDURE — 62326 NJX INTERLAMINAR LMBR/SAC: CPT | Performed by: ANESTHESIOLOGY

## 2017-08-27 PROCEDURE — 25000003 PHARM REV CODE 250: Performed by: NURSE ANESTHETIST, CERTIFIED REGISTERED

## 2017-08-27 PROCEDURE — 86900 BLOOD TYPING SEROLOGIC ABO: CPT

## 2017-08-27 PROCEDURE — 51701 INSERT BLADDER CATHETER: CPT

## 2017-08-27 PROCEDURE — 25000003 PHARM REV CODE 250: Performed by: ADVANCED PRACTICE MIDWIFE

## 2017-08-27 PROCEDURE — 25000003 PHARM REV CODE 250: Performed by: OBSTETRICS & GYNECOLOGY

## 2017-08-27 PROCEDURE — 25000003 PHARM REV CODE 250: Performed by: MIDWIFE

## 2017-08-27 PROCEDURE — 85007 BL SMEAR W/DIFF WBC COUNT: CPT

## 2017-08-27 PROCEDURE — 11000001 HC ACUTE MED/SURG PRIVATE ROOM

## 2017-08-27 PROCEDURE — 86850 RBC ANTIBODY SCREEN: CPT

## 2017-08-27 PROCEDURE — 85027 COMPLETE CBC AUTOMATED: CPT

## 2017-08-27 RX ORDER — IBUPROFEN 800 MG/1
800 TABLET ORAL EVERY 8 HOURS
Status: DISCONTINUED | OUTPATIENT
Start: 2017-08-28 | End: 2017-08-28

## 2017-08-27 RX ORDER — IBUPROFEN 600 MG/1
600 TABLET ORAL EVERY 6 HOURS
Status: DISCONTINUED | OUTPATIENT
Start: 2017-08-27 | End: 2017-08-29 | Stop reason: HOSPADM

## 2017-08-27 RX ORDER — KETOROLAC TROMETHAMINE 30 MG/ML
30 INJECTION, SOLUTION INTRAMUSCULAR; INTRAVENOUS EVERY 6 HOURS
Status: DISCONTINUED | OUTPATIENT
Start: 2017-08-27 | End: 2017-08-28

## 2017-08-27 RX ORDER — OXYCODONE AND ACETAMINOPHEN 5; 325 MG/1; MG/1
1 TABLET ORAL EVERY 4 HOURS PRN
Status: DISCONTINUED | OUTPATIENT
Start: 2017-08-27 | End: 2017-08-29 | Stop reason: HOSPADM

## 2017-08-27 RX ORDER — AMMONIA 15 % (W/V)
0.3 AMPUL (EA) INHALATION CONTINUOUS PRN
Status: DISCONTINUED | OUTPATIENT
Start: 2017-08-27 | End: 2017-08-29 | Stop reason: HOSPADM

## 2017-08-27 RX ORDER — HYDROCORTISONE 25 MG/G
CREAM TOPICAL 3 TIMES DAILY PRN
Status: DISCONTINUED | OUTPATIENT
Start: 2017-08-27 | End: 2017-08-29 | Stop reason: HOSPADM

## 2017-08-27 RX ORDER — SODIUM CHLORIDE, SODIUM LACTATE, POTASSIUM CHLORIDE, CALCIUM CHLORIDE 600; 310; 30; 20 MG/100ML; MG/100ML; MG/100ML; MG/100ML
INJECTION, SOLUTION INTRAVENOUS CONTINUOUS
Status: DISCONTINUED | OUTPATIENT
Start: 2017-08-27 | End: 2017-08-27

## 2017-08-27 RX ORDER — ROPIVACAINE HYDROCHLORIDE 2 MG/ML
INJECTION, SOLUTION EPIDURAL; INFILTRATION; PERINEURAL
Status: DISCONTINUED | OUTPATIENT
Start: 2017-08-27 | End: 2017-08-27

## 2017-08-27 RX ORDER — DOCUSATE SODIUM 100 MG/1
100 CAPSULE, LIQUID FILLED ORAL DAILY
Status: DISCONTINUED | OUTPATIENT
Start: 2017-08-28 | End: 2017-08-29 | Stop reason: HOSPADM

## 2017-08-27 RX ORDER — ROPIVACAINE HYDROCHLORIDE 2 MG/ML
INJECTION, SOLUTION EPIDURAL; INFILTRATION; PERINEURAL CONTINUOUS PRN
Status: DISCONTINUED | OUTPATIENT
Start: 2017-08-27 | End: 2017-08-27

## 2017-08-27 RX ORDER — OXYCODONE AND ACETAMINOPHEN 10; 325 MG/1; MG/1
1 TABLET ORAL EVERY 4 HOURS PRN
Status: DISCONTINUED | OUTPATIENT
Start: 2017-08-27 | End: 2017-08-29 | Stop reason: HOSPADM

## 2017-08-27 RX ORDER — DIPHENHYDRAMINE HCL 25 MG
25 CAPSULE ORAL EVERY 4 HOURS PRN
Status: DISCONTINUED | OUTPATIENT
Start: 2017-08-27 | End: 2017-08-29 | Stop reason: HOSPADM

## 2017-08-27 RX ORDER — ONDANSETRON 8 MG/1
8 TABLET, ORALLY DISINTEGRATING ORAL EVERY 8 HOURS PRN
Status: DISCONTINUED | OUTPATIENT
Start: 2017-08-27 | End: 2017-08-27

## 2017-08-27 RX ORDER — LIDOCAINE HYDROCHLORIDE AND EPINEPHRINE 15; 5 MG/ML; UG/ML
INJECTION, SOLUTION EPIDURAL
Status: DISCONTINUED | OUTPATIENT
Start: 2017-08-27 | End: 2017-08-27

## 2017-08-27 RX ORDER — ONDANSETRON 8 MG/1
8 TABLET, ORALLY DISINTEGRATING ORAL EVERY 8 HOURS PRN
Status: DISCONTINUED | OUTPATIENT
Start: 2017-08-27 | End: 2017-08-29 | Stop reason: HOSPADM

## 2017-08-27 RX ORDER — OXYTOCIN/RINGER'S LACTATE 20/1000 ML
90 PLASTIC BAG, INJECTION (ML) INTRAVENOUS
Status: DISCONTINUED | OUTPATIENT
Start: 2017-08-27 | End: 2017-08-28

## 2017-08-27 RX ORDER — ACETAMINOPHEN 325 MG/1
650 TABLET ORAL EVERY 6 HOURS PRN
Status: DISCONTINUED | OUTPATIENT
Start: 2017-08-27 | End: 2017-08-29 | Stop reason: HOSPADM

## 2017-08-27 RX ADMIN — IBUPROFEN 600 MG: 600 TABLET, FILM COATED ORAL at 11:08

## 2017-08-27 RX ADMIN — LIDOCAINE HYDROCHLORIDE,EPINEPHRINE BITARTRATE 5 ML: 15; .005 INJECTION, SOLUTION EPIDURAL; INFILTRATION; INTRACAUDAL; PERINEURAL at 09:08

## 2017-08-27 RX ADMIN — SODIUM CHLORIDE, SODIUM LACTATE, POTASSIUM CHLORIDE, AND CALCIUM CHLORIDE 1000 ML: .6; .31; .03; .02 INJECTION, SOLUTION INTRAVENOUS at 08:08

## 2017-08-27 RX ADMIN — ROPIVACAINE HYDROCHLORIDE 12 ML/HR: 2 INJECTION, SOLUTION EPIDURAL; INFILTRATION at 09:08

## 2017-08-27 RX ADMIN — ROPIVACAINE HYDROCHLORIDE 12 ML: 2 INJECTION, SOLUTION EPIDURAL; INFILTRATION at 09:08

## 2017-08-27 RX ADMIN — IBUPROFEN 600 MG: 600 TABLET, FILM COATED ORAL at 06:08

## 2017-08-27 RX ADMIN — AMOXICILLIN 500 MG: 500 CAPSULE ORAL at 05:08

## 2017-08-27 RX ADMIN — SODIUM CHLORIDE, PRESERVATIVE FREE 3 ML: 5 INJECTION INTRAVENOUS at 05:08

## 2017-08-27 NOTE — SUBJECTIVE & OBJECTIVE
Interval History:  April is a 22 y.o.  at 32w1d. She reports having contractions since last night becoming more uncomfortable over past few hours. Denies bleeding . Good FM    Objective:     Vital Signs (Most Recent):  Temp: 98.4 °F (36.9 °C) (17 0400)  Pulse: 85 (17 0400)  Resp: 16 (17 0400)  BP: (!) 103/58 (17 0400)  SpO2: 100 % (17 1843) Vital Signs (24h Range):  Temp:  [98 °F (36.7 °C)-98.5 °F (36.9 °C)] 98.4 °F (36.9 °C)  Pulse:  [75-92] 85  Resp:  [16-18] 16  BP: ()/(47-58) 103/58     Weight: 64.9 kg (143 lb)  Body mass index is 25.33 kg/m².    FHT: 130 Cat 1 (reassuring)  TOCO:  Q 6-20 minutes    Cervical Exam:  Dilation:  5  Effacement:  90  Station: 2  Presentation: Vertex     Significant Labs:  Lab Results   Component Value Date    GROUPTRH A POS 2017    HEPBSAG Negative 2017    STREPBCULT No Group B Streptococcus isolated 2017     Physical Exam:   Constitutional: She is oriented to person, place, and time. No distress.        Pulmonary/Chest: Effort normal.        Abdominal: Soft.   Gravid. Contractions not well palpable     Genitourinary:   Genitourinary Comments: Cervix 5/90/+2 vertex           Musculoskeletal: Normal range of motion.       Neurological: She is alert and oriented to person, place, and time.    Skin: Skin is warm and dry.    Psychiatric: She has a normal mood and affect. Her behavior is normal.

## 2017-08-27 NOTE — NURSING
[employer] pump at bedside. Instructed on proper usage and to adjust suction according to comfort level. Verified appropriate flange fit- 24mm. Educated mother on frequency and duration of pumping in order to promote and maintain full milk supply. Hands on pumping technique reviewed. Encouraged hand expression after. Instructed mother on cleaning of breast pump parts. Reviewed proper milk handling, collection, storage, and transportation. Voices understanding.

## 2017-08-27 NOTE — NURSING
Patient had complaints of pain and contractions. Patient was connected to NST. Dr. Rosa assessed patient during morning rounds. Will continue to monitor.

## 2017-08-27 NOTE — PROGRESS NOTES
[facility]  Obstetrics  Labor Progress Note    Patient Name: Kristina Khoury  MRN: [id number]  Admission Date: 2017  Hospital Length of Stay: 4 days  Attending Physician: Linda Jones MD  Primary Care Provider: NORMA Lane    Subjective:     Principal Problem:  labor & premature rupture of membranes    Hospital Course:  Patient admitted at 31 weeks EGA for PPROM.  S/P BMZ series.  PPROM latency antibiotics given.  17 @ 630a-pt c/o uterine contractions. Has been on monitor all last night. Reports worse over past few hours. (+) labor    Interval History:  April is a 22 y.o.  at 32w1d. She reports having contractions since last night becoming more uncomfortable over past few hours. Denies bleeding . Good FM    Objective:     Vital Signs (Most Recent):  Temp: 98.4 °F (36.9 °C) (17 0400)  Pulse: 85 (17 0400)  Resp: 16 (17 0400)  BP: (!) 103/58 (17 0400)  SpO2: 100 % (17 1843) Vital Signs (24h Range):  Temp:  [98 °F (36.7 °C)-98.5 °F (36.9 °C)] 98.4 °F (36.9 °C)  Pulse:  [75-92] 85  Resp:  [16-18] 16  BP: ()/(47-58) 103/58     Weight: 64.9 kg (143 lb)  Body mass index is 25.33 kg/m².    FHT: 130 Cat 1 (reassuring)  TOCO:  Q 6-20 minutes    Cervical Exam:  Dilation:  5  Effacement:  90  Station: 2  Presentation: Vertex     Significant Labs:  Lab Results   Component Value Date    GROUPTRH A POS 2017    HEPBSAG Negative 2017    STREPBCULT No Group B Streptococcus isolated 2017     Physical Exam:   Constitutional: She is oriented to person, place, and time. No distress.        Pulmonary/Chest: Effort normal.        Abdominal: Soft.   Gravid. Contractions not well palpable     Genitourinary:   Genitourinary Comments: Cervix 5/90/+2 vertex           Musculoskeletal: Normal range of motion.       Neurological: She is alert and oriented to person, place, and time.    Skin: Skin is warm and dry.    Psychiatric: She has a  normal mood and affect. Her behavior is normal.       Assessment/Plan:     22 y.o. female  at 32w1d for:    1. Premature rupture of membranes s/p steroids & latency antibiotics  2.  labor-will transfer to L&D. Counseled on risks of prematurity  3. Tobacco use  4. Insufficient prenatal care      Verna Rosa MD  Obstetrics  [facility]

## 2017-08-27 NOTE — PLAN OF CARE
Problem: Patient Care Overview  Goal: Plan of Care Review  Outcome: Ongoing (interventions implemented as appropriate)  Patient is stable. Patient has stable VS. Patient is ambulating with no assistance. NST was done on my shift and L&D was called to monitor pt while she was connected to the monitor. Patient has call light in reach. Mid wife Maria Guadalupe was called per patients request to look over patients personal sheet of logged contractions. Maria Guadalupe came over to see patient. No complaints of pain at this time. Will continue to monitor.

## 2017-08-27 NOTE — ANESTHESIA PROCEDURE NOTES
Epidural    Patient location during procedure: OB   Reason for block: primary anesthetic   Diagnosis: iup   Start time: 8/27/2017 8:59 AM  Timeout: 8/27/2017 8:54 AM  End time: 8/27/2017 9:06 AM  Staffing  Anesthesiologist: MIGUEL VILLASEÑOR  Resident/CRNA: SAULO SPENCE  Performed: anesthesiologist   Preanesthetic Checklist  Completed: patient identified, pre-op evaluation, timeout performed, IV checked, risks and benefits discussed, monitors and equipment checked, anesthesia consent given, hand hygiene performed and patient being monitored  Preparation  Patient position: sitting  Prep: Betadine  Patient monitoring: Pulse Ox and Blood Pressure  Epidural  Skin Anesthetic: lidocaine 1%  Skin Wheal: 3 mL  Administration type: continuous  Approach: midline  Interspace: L3-4  Injection technique: CLARIBEL air  Needle and Epidural Catheter  Needle type: Tuohy   Needle gauge: 18  Needle length: 3.5 inches  Needle insertion depth: 5 cm  Catheter type: multi-orifice  Catheter size: 20 G  Catheter at skin depth: 10 cm  Test dose: 5 mL of lidocaine 1.5% with Epi 1-to-200,000  Additional Documentation: incremental injection, negative aspiration for heme and CSF, no signs/symptoms of IV or SA injection, no paresthesia on injection, no significant pain on injection and no significant complaints from patient  Needle localization: anatomical landmarks  Medications:  Bolus administered: 12 mL of 0.2% ropivacaine  Volume per aspiration: 4 mL  Assessment  Ease of block: easy  Patient's tolerance of the procedure: comfortable throughout block

## 2017-08-27 NOTE — PLAN OF CARE
Problem: Patient Care Overview  Goal: Plan of Care Review  Outcome: Ongoing (interventions implemented as appropriate)  Pt delivered per  @32w&1d AOG. Epidural for pain management. Beast feeding.

## 2017-08-28 PROCEDURE — 11000001 HC ACUTE MED/SURG PRIVATE ROOM

## 2017-08-28 PROCEDURE — 25000003 PHARM REV CODE 250: Performed by: MIDWIFE

## 2017-08-28 RX ADMIN — IBUPROFEN 600 MG: 600 TABLET, FILM COATED ORAL at 06:08

## 2017-08-28 RX ADMIN — IBUPROFEN 600 MG: 600 TABLET, FILM COATED ORAL at 05:08

## 2017-08-28 RX ADMIN — IBUPROFEN 600 MG: 600 TABLET, FILM COATED ORAL at 12:08

## 2017-08-28 RX ADMIN — DOCUSATE SODIUM 100 MG: 100 CAPSULE, LIQUID FILLED ORAL at 09:08

## 2017-08-28 RX ADMIN — IBUPROFEN 600 MG: 600 TABLET, FILM COATED ORAL at 11:08

## 2017-08-28 NOTE — ANESTHESIA RELEASE NOTE
"Anesthesia Release from PACU Note    Patient: April Pearl Khoury    Procedure(s) Performed: * No procedures listed *    Anesthesia type: epidural    Post pain: Adequate analgesia    Post assessment: no apparent anesthetic complications    Last Vitals:   Visit Vitals  BP (!) 114/54   Pulse 90   Temp 36.3 °C (97.4 °F) (Axillary)   Resp 18   Ht 5' 3" (1.6 m)   Wt 64.9 kg (143 lb)   LMP 01/14/2017   SpO2 100%   Breastfeeding? Unknown   BMI 25.33 kg/m²       Post vital signs: stable    Level of consciousness: awake    Nausea/Vomiting: no nausea/no vomiting    Complications: none    Airway Patency: patent    Respiratory: unassisted    Cardiovascular: stable and blood pressure at baseline    Hydration: euvolemic  "

## 2017-08-28 NOTE — PLAN OF CARE
Problem: Patient Care Overview  Goal: Plan of Care Review  Outcome: Ongoing (interventions implemented as appropriate)  Pt progressing well. Voids and ambulates independently. Pt pumping and walking colostrum to NICU. VSS.

## 2017-08-28 NOTE — PLAN OF CARE
Problem: Patient Care Overview  Goal: Plan of Care Review  Outcome: Ongoing (interventions implemented as appropriate)  Resting in bed holding two-year-old son. Significant other brought stickers from NICU for pumped colostrum.

## 2017-08-28 NOTE — PLAN OF CARE
Problem: Patient Care Overview  Goal: Plan of Care Review  Outcome: Ongoing (interventions implemented as appropriate)  Patient is stable. Patient has stable VS. Patient is ambulating with no assistance. Pt has been using breast pump q 2-3hrs and taking milk to NICU. Will continue to monitor.

## 2017-08-28 NOTE — ANESTHESIA POSTPROCEDURE EVALUATION
"Anesthesia Post Evaluation    Patient: Kristina Khoury    Procedure(s) Performed: * No procedures listed *    Final Anesthesia Type: epidural  Patient location during evaluation: labor & delivery  Patient participation: Yes- Able to Participate  Level of consciousness: awake and alert  Post-procedure vital signs: reviewed and stable  Pain management: adequate  Airway patency: patent  PONV status at discharge: No PONV  Anesthetic complications: no      Cardiovascular status: blood pressure returned to baseline  Respiratory status: unassisted  Hydration status: euvolemic  Follow-up not needed.        Visit Vitals  BP (!) 114/54   Pulse 90   Temp 36.3 °C (97.4 °F) (Axillary)   Resp 18   Ht 5' 3" (1.6 m)   Wt 64.9 kg (143 lb)   LMP 01/14/2017   SpO2 100%   Breastfeeding? Unknown   BMI 25.33 kg/m²       Pain/Blaire Score: Pain Rating Prior to Med Admin: 3 (8/27/2017  6:30 PM)      "

## 2017-08-28 NOTE — LACTATION NOTE
Lactation rounds  Mother has been pumping frequently. Encouraged mother to call for lactation next pumping session to review hands on pumping and hand expression.

## 2017-08-28 NOTE — L&D DELIVERY NOTE
of viable female infant   Infant placed on maternal abdomen  Cord double clamped and cut, VC noted, infant handed to NICU team d/t prematurity  Placenta delivered spont and intact via [place] method  Upon inspection of perineum and vagina, noted to be intact  Mom stable in LDR    CHRISTINA Riley     Delivery Information for  Girl April Brown    Birth information:  YOB: 2018   Time of birth: 3:40 PM   Sex: female   Head Delivery Date/Time: 2017  3:40 PM   Delivery type: Vaginal, Spontaneous Delivery   Gestational Age: 32w1d    Delivery Providers    Delivering clinician:  David Lacy CNM   Other personnel:   Provider Role   Zahida Kebede, RN Registered Nurse   Radha Gaxiola, RN Registered Nurse   Abby Vicente, RN Registered Nurse   Alisha Wu Medical Student   Adriana Andrew, NP Nurse Practitioner   Thad Munoz Jr., MD Pediatrician   Partha Bean, RRT Day Respiratory Care Practitioner   Abby Mckee, RN Registered Nurse   Bee Calvo, [facility]               [place] Measurements    Weight:  1650 g Length:  41 cm   Head circum.:  30 cm Chest circum.:  26 cm   Abdominal girth:  23.5 cm          Assessment    Apgars:     1 Minute:   5 Minute:   10 Minute:   15 Minute:   20 Minute:     Skin Color:   0  1       Heart Rate:   2  2       Reflex Irritability:   2  2       Muscle Tone:   2  2       Respiratory Effort:   2  2       Total:   8  9                      Assisted Delivery Details:    Forceps attempted?:  No  Vacuum extractor attempted?:  No         Shoulder Dystocia    Shoulder dystocia present?:  No           Presentation and Position    Presentation:   Vertex   Position:   Left                Interventions/Resuscitation    Method:  Bulb Suctioning, NICU Attended, Tactile Stimulation, Deep Suctioning       Cord    Vessels:  3 vessels  Complications:  None  Delayed Cord Clamping?:  No  Cord Clamped Date/Time:  2017  3:41  PM  Cord Blood Disposition:  Lab  Gases Sent?:  No  Stem Cell Collection (by MD):  No       Placenta    Date and time:  2017  3:43 PM  Removal:  Spontaneous  Appearance:  Intact  Placenta disposition:  discarded           Labor Events:       labor: Yes     Labor Onset Date/Time:         Dilation Complete Date/Time:         Start Pushing Date/Time:       Rupture Date/Time:              Rupture type:           Fluid Amount:        Fluid Color:        Fluid Odor:        Membrane Status (PeriCalm): SRM (Spontaneous Rupture)      Rupture Date/Time (PeriCalm): 2017 16:30:00      Fluid Amount (PeriCalm):        Fluid Color (PeriCalm): Clear       steroids: Full Course     Antibiotics given for GBS: No     Induction: none     Indications for induction:        Augmentation:       Indications for augmentation:       Labor complications: None     Additional complications:          Cervical ripening:                     Delivery:      Episiotomy: None     Indication for Episiotomy:       Perineal Lacerations: None Repaired:      Periurethral Laceration: none Repaired:     Labial Laceration: none Repaired:     Sulcus Laceration: none Repaired:     Vaginal Laceration: No Repaired:     Cervical Laceration: No Repaired:     Repair suture: None     Repair # of packets:       Vaginal delivery QBL (mL): 25      QBL (mL): 0     Combined Blood Loss (mL): 25     Vaginal Sweep Performed: No     Surgicount Correct:        Other providers:       Anesthesia    Method:  Epidural          Details (if applicable):  Trial of Labor      Categorization:      Priority:     Indications for :     Incision Type:       Additional  information:  Forceps:    Vacuum:    Breech:    Observed anomalies    Other (Comments):

## 2017-08-28 NOTE — TRANSFER OF CARE
"Anesthesia Transfer of Care Note    Patient: Kristina Khoury    Procedure(s) Performed: * No procedures listed *    Patient location: Labor and Delivery    Anesthesia Type: epidural    Post pain: adequate analgesia    Post assessment: no apparent anesthetic complications    Post vital signs: stable    Level of consciousness: awake    Nausea/Vomiting: no nausea/vomiting    Complications: none    Transfer of care protocol was followed      Last vitals:   Visit Vitals  BP (!) 114/54   Pulse 90   Temp 36.3 °C (97.4 °F) (Axillary)   Resp 18   Ht 5' 3" (1.6 m)   Wt 64.9 kg (143 lb)   LMP 01/14/2017   SpO2 100%   Breastfeeding? Unknown   BMI 25.33 kg/m²     "

## 2017-08-29 VITALS
WEIGHT: 143 LBS | TEMPERATURE: 100 F | RESPIRATION RATE: 18 BRPM | SYSTOLIC BLOOD PRESSURE: 99 MMHG | DIASTOLIC BLOOD PRESSURE: 61 MMHG | BODY MASS INDEX: 25.34 KG/M2 | HEART RATE: 75 BPM | HEIGHT: 63 IN | OXYGEN SATURATION: 100 %

## 2017-08-29 PROCEDURE — 25000003 PHARM REV CODE 250: Performed by: MIDWIFE

## 2017-08-29 PROCEDURE — 99238 HOSP IP/OBS DSCHRG MGMT 30/<: CPT | Mod: ,,, | Performed by: ADVANCED PRACTICE MIDWIFE

## 2017-08-29 RX ADMIN — DOCUSATE SODIUM 100 MG: 100 CAPSULE, LIQUID FILLED ORAL at 08:08

## 2017-08-29 RX ADMIN — IBUPROFEN 600 MG: 600 TABLET, FILM COATED ORAL at 11:08

## 2017-08-29 RX ADMIN — IBUPROFEN 600 MG: 600 TABLET, FILM COATED ORAL at 05:08

## 2017-08-29 NOTE — PROGRESS NOTES
[facility]   Obstetrics  Postpartum Progress Note    Patient Name: Kristina Khoury  MRN: [id number]  Admission Date: 2017  Hospital Length of Stay: 6 days  Attending Physician: Linda Jones MD  Primary Care Provider: NORMA Lane    Subjective:     Principal Problem: delivery    Hospital course: Patient admitted at 31 weeks EGA for PPROM.  S/P BMZ series.  PPROM latency antibiotics given.  17 @ 630a-pt c/o uterine contractions. Has been on monitor all last night. Reports worse over past few hours. (+) labor. Progressed to .  17 1st day S/P  - Baby NICU- 32 weeks- Pumping  17 2nd day pp baby remains in NICU, still pumping    Interval History: Emotional today because she has to leave and baby stays in NICU, but physically doing well and understands that she has to take care of her 2 year old son also.    She is doing well this morning. She is tolerating a regular diet without nausea or vomiting. She is voiding spontaneously. She is ambulating. She has passed flatus, and has a BM. Vaginal bleeding is mild. She denies fever or chills. Abdominal pain is mild and controlled with oral medications. She is breastfeeding. She desires circumcision for her male baby: not applicable.    Objective:     Vital Signs (Most Recent):  Temp: 99.9 °F (37.7 °C) (17 0800)  Pulse: 75 (17 0800)  Resp: 18 (17 0800)  BP: 99/61 (17 0800)  SpO2: 100 % (17 0940) Vital Signs (24h Range):  Temp:  [98 °F (36.7 °C)-99.9 °F (37.7 °C)] 99.9 °F (37.7 °C)  Pulse:  [61-75] 75  Resp:  [16-18] 18  BP: ()/(53-63) 99/61     Weight: 64.9 kg (143 lb)  Body mass index is 25.33 kg/m².    No intake or output data in the 24 hours ending 17 1206    Significant Labs:  Lab Results   Component Value Date    GROUPTRH A POS 2017    HEPBSAG Negative 2017    STREPBCULT No Group B Streptococcus isolated 2017     No results for input(s): HGB, HCT in the  last 48 hours.    I have personallly reviewed all pertinent lab results from the last 24 hours.    Physical Exam:   Constitutional: She is oriented to person, place, and time. She appears well-developed and well-nourished.       Cardiovascular: Normal rate and regular rhythm.     Pulmonary/Chest: Effort normal and breath sounds normal.        Abdominal: Soft. Bowel sounds are normal.     Genitourinary: Uterus normal.               Neurological: She is alert and oriented to person, place, and time. She has normal reflexes.    Skin: Skin is warm and dry.    Psychiatric: She has a normal mood and affect. Her behavior is normal.       Assessment/Plan:     22 y.o. female  for:    Smoker             *  delivery    Routine PP care            Disposition: As patient meets milestones, will plan to discharge today.    Stacey Jones CNM  Obstetrics  [facility]

## 2017-08-29 NOTE — SUBJECTIVE & OBJECTIVE
Hospital course: Patient admitted at 31 weeks EGA for PPROM.  S/P BMZ series.  PPROM latency antibiotics given.  17 @ 630a-pt c/o uterine contractions. Has been on monitor all last night. Reports worse over past few hours. (+) labor. Progressed to .  17 1st day S/P  - Baby NICU- 32 weeks- Pumping    Interval History:     She is doing well this morning. She is tolerating a regular diet without nausea or vomiting. She is voiding spontaneously. She is ambulating. She has passed flatus, and has not a BM. Vaginal bleeding is mild. She denies fever or chills. Abdominal pain is mild and controlled with oral medications. She is pumping/breastfeeding.  Objective:     Vital Signs (Most Recent):  Temp: 98.2 °F (36.8 °C) (17 0000)  Pulse: 61 (17 0000)  Resp: 18 (17 0000)  BP: 123/61 (17 0000)  SpO2: 100 % (17 0940) Vital Signs (24h Range):  Temp:  [98 °F (36.7 °C)-98.4 °F (36.9 °C)] 98.2 °F (36.8 °C)  Pulse:  [61-70] 61  Resp:  [16-18] 18  BP: (105-123)/(53-63) 123/61     Weight: 64.9 kg (143 lb)  Body mass index is 25.33 kg/m².    No intake or output data in the 24 hours ending 17 0444    Significant Labs:  Lab Results   Component Value Date    GROUPTRH A POS 2017    HEPBSAG Negative 2017    STREPBCULT No Group B Streptococcus isolated 2017       Recent Labs  Lab 17  0725   HGB 9.4*   HCT 27.8*       I have personallly reviewed all pertinent lab results from the last 24 hours.    Physical Exam

## 2017-08-29 NOTE — SUBJECTIVE & OBJECTIVE
Hospital course: Patient admitted at 31 weeks EGA for PPROM.  S/P BMZ series.  PPROM latency antibiotics given.  17 @ 630a-pt c/o uterine contractions. Has been on monitor all last night. Reports worse over past few hours. (+) labor. Progressed to .  17 1st day S/P  - Baby NICU- 32 weeks- Pumping  17 2nd day pp baby remains in NICU, still pumping    Interval History: Emotional today because she has to leave and baby stays in NICU, but physically doing well and understands that she has to take care of her 2 year old son also.    She is doing well this morning. She is tolerating a regular diet without nausea or vomiting. She is voiding spontaneously. She is ambulating. She has passed flatus, and has a BM. Vaginal bleeding is mild. She denies fever or chills. Abdominal pain is mild and controlled with oral medications. She is breastfeeding. She desires circumcision for her male baby: not applicable.    Objective:     Vital Signs (Most Recent):  Temp: 99.9 °F (37.7 °C) (17 0800)  Pulse: 75 (17 0800)  Resp: 18 (17 0800)  BP: 99/61 (17 0800)  SpO2: 100 % (17 0940) Vital Signs (24h Range):  Temp:  [98 °F (36.7 °C)-99.9 °F (37.7 °C)] 99.9 °F (37.7 °C)  Pulse:  [61-75] 75  Resp:  [16-18] 18  BP: ()/(53-63) 99/61     Weight: 64.9 kg (143 lb)  Body mass index is 25.33 kg/m².    No intake or output data in the 24 hours ending 17 1206    Significant Labs:  Lab Results   Component Value Date    GROUPTRH A POS 2017    HEPBSAG Negative 2017    STREPBCULT No Group B Streptococcus isolated 2017     No results for input(s): HGB, HCT in the last 48 hours.    I have personallly reviewed all pertinent lab results from the last 24 hours.    Physical Exam:   Constitutional: She is oriented to person, place, and time. She appears well-developed and well-nourished.       Cardiovascular: Normal rate and regular rhythm.     Pulmonary/Chest: Effort normal and  breath sounds normal.        Abdominal: Soft. Bowel sounds are normal.     Genitourinary: Uterus normal.               Neurological: She is alert and oriented to person, place, and time. She has normal reflexes.    Skin: Skin is warm and dry.    Psychiatric: She has a normal mood and affect. Her behavior is normal.

## 2017-08-29 NOTE — PLAN OF CARE
Problem: Patient Care Overview  Goal: Plan of Care Review  Outcome: Ongoing (interventions implemented as appropriate)  Patient is stable. Patient has stable VS. Patient is ambulating with no assistance. Patient is pumping and walking milk to NICU. Will continue to monitor.

## 2017-08-29 NOTE — DISCHARGE SUMMARY
[facility]   Obstetrics  Discharge Summary      Patient Name: Kristina Khoury  MRN: [id number]  Admission Date: 2017  Hospital Length of Stay: 6 days  Discharge Date and Time:  2017 6:52 PM  Attending Physician: No att. providers found   Discharging Provider: Stacey Jones CNM  Primary Care Provider: NORMA Lane    HPI: Pt presents complaining of SROM at 1630    * No surgery found *     Hospital Course:   Patient admitted at 31 weeks EGA for PPROM.  S/P BMZ series.  PPROM latency antibiotics given.  17 @ 630a-pt c/o uterine contractions. Has been on monitor all last night. Reports worse over past few hours. (+) labor. Progressed to .  17 1st day S/P  - Baby NICU- 32 weeks- Pumping  17 2nd day pp baby remains in NICU, still pumping        Final Active Diagnoses:    Diagnosis Date Noted POA    PRINCIPAL PROBLEM:   delivery [O60.10X0] 2017 No    Delivery outcome of single liveborn infant [Z37.0] 2017 Not Applicable      Problems Resolved During this Admission:    Diagnosis Date Noted Date Resolved POA     labor in third trimester [O60.03] 2017 Unknown    Premature rupture of membranes [O42.90] 2017 Yes        Labs: All labs within the past 24 hours have been reviewed    Feeding Method: breast    Immunizations     Date Immunization Status Dose Route/Site Given by    17 0857 MMR Deferred (Other) 0.5 mL Subcutaneous/Left deltoid Nancy Fernandez RN    17 0858 Tdap Deferred 0.5 mL Intramuscular/Left deltoid Nancy Fernandez RN          Delivery:    Episiotomy: None   Lacerations: None   Repair suture: None   Repair # of packets:     Blood loss (ml): 25     Birth information:  YOB: 2018   Time of birth: 3:40 PM   Sex: female   Delivery type: Vaginal, Spontaneous Delivery   Gestational Age: 32w1d    Delivery Clinician:      Other providers:       Additional   information:  Forceps:    Vacuum:    Breech:    Observed anomalies      Living?:           APGARS  One minute Five minutes Ten minutes   Skin color:         Heart rate:         Grimace:         Muscle tone:         Breathing:         Totals: 8  9        Placenta: Delivered:       appearance    Pending Diagnostic Studies:     None          Discharged Condition: good    Disposition: Home or Self Care    Follow Up:    Patient Instructions:     Diet general       Medications:  Discharge Medication List as of 8/29/2017  1:46 PM      CONTINUE these medications which have NOT CHANGED    Details   PNV62/FA/OM3/DHA/EPA/FISH OIL (PRENATAL GUMMY ORAL) Take by mouth once daily., Until Discontinued, Historical Med      promethazine (PHENERGAN) 25 MG tablet Take 0.5 tablets (12.5 mg total) by mouth every 6 (six) hours as needed for Nausea., Starting 4/5/2017, Until Discontinued, Print         STOP taking these medications       nitrofurantoin, macrocrystal-monohydrate, (MACROBID) 100 MG capsule Comments:   Reason for Stopping:               Stacey Jones CNM  Obstetrics  [facility]

## 2017-08-29 NOTE — PROGRESS NOTES
[facility] -   Obstetrics  Postpartum Progress Note    Patient Name: Kristina Khoury  MRN: [id number]  Admission Date: 2017  Hospital Length of Stay: 6 days  Attending Physician: Linda Jones MD  Primary Care Provider: NORMA Lane    Subjective:     Principal Problem: delivery    Hospital course: Patient admitted at 31 weeks EGA for PPROM.  S/P BMZ series.  PPROM latency antibiotics given.  17 @ 630a-pt c/o uterine contractions. Has been on monitor all last night. Reports worse over past few hours. (+) labor. Progressed to .  17 1st day S/P  - Baby NICU- 32 weeks- Pumping    Interval History:     She is doing well this morning. She is tolerating a regular diet without nausea or vomiting. She is voiding spontaneously. She is ambulating. She has passed flatus, and has not a BM. Vaginal bleeding is mild. She denies fever or chills. Abdominal pain is mild and controlled with oral medications. She is pumping/breastfeeding.  Objective:     Vital Signs (Most Recent):  Temp: 98.2 °F (36.8 °C) (17 0000)  Pulse: 61 (17 0000)  Resp: 18 (17 0000)  BP: 123/61 (17 0000)  SpO2: 100 % (17 0940) Vital Signs (24h Range):  Temp:  [98 °F (36.7 °C)-98.4 °F (36.9 °C)] 98.2 °F (36.8 °C)  Pulse:  [61-70] 61  Resp:  [16-18] 18  BP: (105-123)/(53-63) 123/61     Weight: 64.9 kg (143 lb)  Body mass index is 25.33 kg/m².    No intake or output data in the 24 hours ending 17 0444    Significant Labs:  Lab Results   Component Value Date    GROUPTRH A POS 2017    HEPBSAG Negative 2017    STREPBCULT No Group B Streptococcus isolated 2017       Recent Labs  Lab 17  0725   HGB 9.4*   HCT 27.8*       I have personallly reviewed all pertinent lab results from the last 24 hours.    Physical Exam    Assessment/Plan:     22 y.o. female  for:    Smoker             *  delivery    Routine PP care            Disposition: As patient  meets milestones, will plan to discharge tomorrow.    Ragini Wilder CNM  Obstetrics  [facility]

## 2017-08-29 NOTE — DISCHARGE INSTRUCTIONS
Mother Self Care:    Activity: Avoid strenuous exercise and get adequate rest.  No driving until your physician gives you consent.  Emotional Changes: The grieving process has many different stages, be prepared to experience lots of emotional ups and downs. Identify people to be your support system, and do not hesitate to call our  if you need someone to talk to.   Breast Care: You may notice milk leaking from your breasts. Wear a support bra 24 hours a day for one week or wrap breasts in an ace bandage if needed to stop milk production.  Avoid stimulation to breasts.  You may use ice packs for discomfort.  Blane-Care/Vaginal Bleeding: Remember to use your blane-bottle after urinating.  Your flow will change from red, to pink, to yellow/white color over a period of 2 weeks.  Menstruation will return in 3-8 weeks.  Episiotomy Vaginal Delivery: Stitches will dissolve within 10 days to 3 weeks.  Warm baths, tucks, and dermoplast spray will promote healing.  Avoid bubble baths or strong soaps.  Sexual Activity/Pelvic Rest: No sexual activity, tampons, or douching until your physician gives you consent.  Diet: Continue to eat from the five basic food groups, including plenty of protein, fruits, vegetables, and whole grains.  Limit empty calories and high fat foods.  Drink enough fluids to satisfy thirst.  Constipation/Hemorrhoids: Drink plenty of water.  You may take a stool softener or natural laxative (Metamucil). You may use tucks or hemorrhoid ointment and soak in a warm tub.    CALL YOUR OB DOCTOR IF ANY OF THE FOLLOWING OCCURS:  *Heavy bleeding - saturating a pad an hour or passing any large (2-3 inches in size) blood clots.  *Any pain, redness, or tenderness in lower leg.  *You cannot care for yourself  *Any signs of infection-      - Temperature greater than 100.5 degrees F      - Foul smelling vaginal discharge and/or incisional drainage      - Increased episiotomy or incisional pain      - Hot, hard,  red or sore area on breast      - Flu-like symptoms      - Any urgency, frequency or burning with urination

## 2017-08-29 NOTE — LACTATION NOTE
Lactation rounds: Medela Symphony pump at bedside. Instructed on proper usage and to adjust suction according to comfort level. Educated mother on frequency and duration of pumping in order to promote and maintain full milk supply. Hands on pumping technique reviewed. Encouraged hand expression after. Instructed mother on cleaning of breast pump parts. Reviewed proper milk handling, collection, storage, and transportation. Voices understanding. Mother pumping one breast at a time, she states its more comfortable. Supa pump rental form given. Mother educated on [facility] program. Mother states she will call [facility] today.      08/29/17 7114   Equipment Type/Education   Pump Type Symphony   Breast Pump Type double electric, hospital grade   Breast Pump Flange Type hard   Breast Pump Flange Size 24 mm   Breast Pumping other (see comments)  (single breast at a time)   Lactation Interventions   Attachment Promotion counseling provided;privacy provided   Breast Care: Breastfeeding milk massaged towards nipple   Breastfeeding Assistance milk expression/pumping;electric breast pump used   Maternal Breastfeeding Support lactation counseling provided;maternal hydration promoted;maternal nutrition promoted;maternal rest encouraged;encouragement offered

## 2017-09-29 ENCOUNTER — TELEPHONE (OUTPATIENT)
Dept: OBSTETRICS AND GYNECOLOGY | Facility: CLINIC | Age: 22
End: 2017-09-29

## 2017-09-30 ENCOUNTER — PATIENT MESSAGE (OUTPATIENT)
Dept: OBSTETRICS AND GYNECOLOGY | Facility: CLINIC | Age: 22
End: 2017-09-30

## 2017-09-30 NOTE — TELEPHONE ENCOUNTER
----- Message from Sharron Jenkins sent at 9/29/2017  4:42 PM CDT -----  Contact: patient  Calling concerning an appointment for 6 weeks post delivery. Please call patient ASAP @ [phone]. Thanks, federico

## 2017-10-11 ENCOUNTER — POSTPARTUM VISIT (OUTPATIENT)
Dept: OBSTETRICS AND GYNECOLOGY | Facility: CLINIC | Age: 22
End: 2017-10-11
Payer: MEDICAID

## 2017-10-11 VITALS — SYSTOLIC BLOOD PRESSURE: 110 MMHG | DIASTOLIC BLOOD PRESSURE: 68 MMHG

## 2017-10-11 PROCEDURE — 99999 PR PBB SHADOW E&M-EST. PATIENT-LVL II: CPT | Mod: PBBFAC,,, | Performed by: ADVANCED PRACTICE MIDWIFE

## 2017-10-11 PROCEDURE — 99212 OFFICE O/P EST SF 10 MIN: CPT | Mod: PBBFAC | Performed by: ADVANCED PRACTICE MIDWIFE

## 2017-10-11 NOTE — PROGRESS NOTES
22 y.o. female for postpartum visit.  Patient has no complaints.  Her delivery records were reviewed.  She is breast feeding infant.    Exam  General - well appearing, no apparent distress  Abdomen - soft, non tender, non distended incision none.  Pelvic - normal external genitalia, any lacerations well healed               uterus non tender, appropriately sized  Extremeties - no edema    Assessment:  Encounter Diagnosis   Name Primary?    Routine postpartum follow-up Yes      Declines contraception at this time  F/u - return for annual exam when due

## 2018-06-21 PROCEDURE — 87086 URINE CULTURE/COLONY COUNT: CPT

## 2018-06-21 PROCEDURE — 87077 CULTURE AEROBIC IDENTIFY: CPT

## 2018-06-21 PROCEDURE — 99284 EMERGENCY DEPT VISIT MOD MDM: CPT | Mod: 25

## 2018-06-21 PROCEDURE — 87186 SC STD MICRODIL/AGAR DIL: CPT

## 2018-06-21 PROCEDURE — 87088 URINE BACTERIA CULTURE: CPT

## 2018-06-22 ENCOUNTER — HOSPITAL ENCOUNTER (EMERGENCY)
Facility: HOSPITAL | Age: 23
Discharge: HOME | End: 2018-06-22
Attending: EMERGENCY MEDICINE
Payer: MEDICAID

## 2018-06-22 VITALS
BODY MASS INDEX: 25.39 KG/M2 | TEMPERATURE: 98 F | WEIGHT: 143.31 LBS | HEIGHT: 63 IN | OXYGEN SATURATION: 98 % | RESPIRATION RATE: 18 BRPM | SYSTOLIC BLOOD PRESSURE: 134 MMHG | DIASTOLIC BLOOD PRESSURE: 69 MMHG | HEART RATE: 89 BPM

## 2018-06-22 DIAGNOSIS — Z71.6 TOBACCO ABUSE COUNSELING: ICD-10-CM

## 2018-06-22 DIAGNOSIS — R10.2 ACUTE SUPRAPUBIC PAIN: Primary | ICD-10-CM

## 2018-06-22 DIAGNOSIS — N30.01 ACUTE CYSTITIS WITH HEMATURIA: ICD-10-CM

## 2018-06-22 LAB
B-HCG UR QL: NEGATIVE
BACTERIA #/AREA URNS HPF: ABNORMAL /HPF
BILIRUB UR QL STRIP: NEGATIVE
CLARITY UR: CLEAR
COLOR UR: YELLOW
GLUCOSE UR QL STRIP: NEGATIVE
HGB UR QL STRIP: ABNORMAL
HYALINE CASTS #/AREA URNS LPF: 0 /LPF
KETONES UR QL STRIP: NEGATIVE
LEUKOCYTE ESTERASE UR QL STRIP: ABNORMAL
MICROSCOPIC COMMENT: ABNORMAL
NITRITE UR QL STRIP: POSITIVE
PH UR STRIP: 6 [PH] (ref 5–8)
PROT UR QL STRIP: ABNORMAL
RBC #/AREA URNS HPF: 20 /HPF (ref 0–4)
SP GR UR STRIP: >=1.03 (ref 1–1.03)
SQUAMOUS #/AREA URNS HPF: 5 /HPF
URN SPEC COLLECT METH UR: ABNORMAL
UROBILINOGEN UR STRIP-ACNC: NEGATIVE EU/DL
WBC #/AREA URNS HPF: >100 /HPF (ref 0–5)

## 2018-06-22 PROCEDURE — 63600175 PHARM REV CODE 636 W HCPCS: Performed by: NURSE PRACTITIONER

## 2018-06-22 PROCEDURE — 25000003 PHARM REV CODE 250: Performed by: NURSE PRACTITIONER

## 2018-06-22 PROCEDURE — 81025 URINE PREGNANCY TEST: CPT

## 2018-06-22 PROCEDURE — 96372 THER/PROPH/DIAG INJ SC/IM: CPT

## 2018-06-22 PROCEDURE — 81000 URINALYSIS NONAUTO W/SCOPE: CPT

## 2018-06-22 RX ORDER — KETOROLAC TROMETHAMINE 10 MG/1
10 TABLET, FILM COATED ORAL EVERY 6 HOURS PRN
Qty: 12 TABLET | Refills: 0 | Status: SHIPPED | OUTPATIENT
Start: 2018-06-22 | End: 2019-02-21

## 2018-06-22 RX ORDER — PHENAZOPYRIDINE HYDROCHLORIDE 200 MG/1
200 TABLET, FILM COATED ORAL 3 TIMES DAILY
Qty: 6 TABLET | Refills: 0 | Status: SHIPPED | OUTPATIENT
Start: 2018-06-22 | End: 2018-07-02

## 2018-06-22 RX ORDER — FLUCONAZOLE 150 MG/1
150 TABLET ORAL DAILY
Qty: 1 TABLET | Refills: 0 | Status: SHIPPED | OUTPATIENT
Start: 2018-06-22 | End: 2018-06-23

## 2018-06-22 RX ORDER — KETOROLAC TROMETHAMINE 10 MG/1
10 TABLET, FILM COATED ORAL
Status: COMPLETED | OUTPATIENT
Start: 2018-06-22 | End: 2018-06-22

## 2018-06-22 RX ORDER — NITROFURANTOIN 25; 75 MG/1; MG/1
100 CAPSULE ORAL 2 TIMES DAILY
Qty: 10 CAPSULE | Refills: 0 | Status: SHIPPED | OUTPATIENT
Start: 2018-06-22 | End: 2018-06-27

## 2018-06-22 RX ORDER — CEFTRIAXONE 1 G/1
1 INJECTION, POWDER, FOR SOLUTION INTRAMUSCULAR; INTRAVENOUS
Status: COMPLETED | OUTPATIENT
Start: 2018-06-22 | End: 2018-06-22

## 2018-06-22 RX ADMIN — KETOROLAC TROMETHAMINE 10 MG: 10 TABLET, FILM COATED ORAL at 01:06

## 2018-06-22 RX ADMIN — CEFTRIAXONE SODIUM 1 G: 1 INJECTION, POWDER, FOR SOLUTION INTRAMUSCULAR; INTRAVENOUS at 01:06

## 2018-06-22 NOTE — ED PROVIDER NOTES
HISTORY     Chief Complaint   Patient presents with    Urinary Tract Infection     Review of patient's allergies indicates:  No Known Allergies     HPI   The history is provided by the patient.   Dysuria    This is a recurrent problem. The current episode started two days ago. The problem has been gradually worsening. The quality of the pain is described as burning. Associated symptoms include chills, frequency and hesitancy. Pertinent negatives include no nausea, no vomiting and no flank pain. She has tried nothing for the symptoms. Her past medical history is significant for recurrent UTIs. Her past medical history does not include kidney stones.        PCP: NORMA Lane     Past Medical History:  Past Medical History:   Diagnosis Date    ADD (attention deficit disorder)     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 hyperactivity     Depression     OCD (obsessive compulsive disorder)     PTSD (post-traumatic stress disorder)     Sciatica     Scoliosis     Vaginal laceration 12/23/2014        Past Surgical History:  Past Surgical History:   Procedure Laterality Date    Pressure equalizer tubes          Family History:  Family History   Problem Relation Age of Onset    Diabetes Paternal Grandmother     Hypertension Paternal Grandmother     Diabetes Maternal Grandmother     Hypertension Maternal Grandmother         Social History:  Social History     Social History Main Topics    Smoking status: Current Every Day Smoker     Packs/day: 1.00     Types: Cigarettes    Smokeless tobacco: Never Used      Comment: 20 cigarettes daily     Alcohol use No    Drug use: No    Sexual activity: Yes     Partners: Male         ROS   Review of Systems   Constitutional: Positive for chills. Negative for fever.   HENT: Negative for sore throat.    Respiratory: Negative for shortness of breath.    Cardiovascular: Negative for chest pain.   Gastrointestinal: Negative for nausea and vomiting.  "  Genitourinary: Positive for dysuria, frequency and hesitancy. Negative for flank pain.   Musculoskeletal: Negative for back pain.   Skin: Negative for rash.   Neurological: Negative for weakness.   Hematological: Does not bruise/bleed easily.       PHYSICAL EXAM     Initial Vitals [06/21/18 2349]   BP Pulse Resp Temp SpO2   139/71 93 18 98.4 °F (36.9 °C) 98 %      MAP       --           Physical Exam    Constitutional: She appears well-developed and well-nourished. No distress.   HENT:   Head: Normocephalic and atraumatic.   Eyes: Conjunctivae are normal. Pupils are equal, round, and reactive to light.   Neck: Normal range of motion. Neck supple.   Cardiovascular: Normal rate, regular rhythm and normal heart sounds.   Pulmonary/Chest: Breath sounds normal.   Abdominal: Soft. Bowel sounds are normal. She exhibits no distension. There is tenderness in the suprapubic area. There is no rebound.   Musculoskeletal: Normal range of motion. She exhibits no edema.   Neurological: She is alert and oriented to person, place, and time. She has normal strength.   Skin: Skin is warm and dry.   Psychiatric: She has a normal mood and affect.          ED COURSE   Procedures  ED ONGOING VITALS:  Vitals:    06/21/18 2349 06/22/18 0152   BP: 139/71 134/69   Pulse: 93 89   Resp: 18 18   Temp: 98.4 °F (36.9 °C)    TempSrc: Oral    SpO2: 98% 98%   Weight: 65 kg (143 lb 4.8 oz)    Height: 5' 3" (1.6 m)          ABNORMAL LAB VALUES:  Labs Reviewed   URINALYSIS - Abnormal; Notable for the following:        Result Value    Specific Gravity, UA >=1.030 (*)     Protein, UA 2+ (*)     Occult Blood UA 3+ (*)     Nitrite, UA Positive (*)     Leukocytes, UA 1+ (*)     All other components within normal limits   URINALYSIS MICROSCOPIC - Abnormal; Notable for the following:     RBC, UA 20 (*)     WBC, UA >100 (*)     Bacteria, UA Many (*)     All other components within normal limits   CULTURE, URINE   CULTURE, URINE   PREGNANCY TEST, URINE RAPID "         ALL LAB VALUES:  Results for orders placed or performed during the hospital encounter of 06/22/18   Urinalysis   Result Value Ref Range    Specimen UA Urine, Clean Catch     Color, UA Yellow Yellow, Straw, Gavi    Appearance, UA Clear Clear    pH, UA 6.0 5.0 - 8.0    Specific Gravity, UA >=1.030 (A) 1.005 - 1.030    Protein, UA 2+ (A) Negative    Glucose, UA Negative Negative    Ketones, UA Negative Negative    Bilirubin (UA) Negative Negative    Occult Blood UA 3+ (A) Negative    Nitrite, UA Positive (A) Negative    Urobilinogen, UA Negative <2.0 EU/dL    Leukocytes, UA 1+ (A) Negative   Rapid Pregnancy, Urine   Result Value Ref Range    Preg Test, Ur Negative    Urinalysis Microscopic   Result Value Ref Range    RBC, UA 20 (H) 0 - 4 /hpf    WBC, UA >100 (H) 0 - 5 /hpf    Bacteria, UA Many (A) None-Occ /hpf    Squam Epithel, UA 5 /hpf    Hyaline Casts, UA 0 0-1/lpf /lpf    Microscopic Comment SEE COMMENT            RADIOLOGY STUDIES:  Imaging Results    None                   The above vital signs and test results have been reviewed by the emergency provider.     ED Medications:  Medications   cefTRIAXone injection 1 g (1 g Intramuscular Given 6/22/18 0140)   ketorolac tablet 10 mg (10 mg Oral Given 6/22/18 0146)       Discharge Medications:  Discharge Medication List as of 6/22/2018  1:38 AM      START taking these medications    Details   ketorolac (TORADOL) 10 mg tablet Take 1 tablet (10 mg total) by mouth every 6 (six) hours as needed for Pain., Starting Fri 6/22/2018, Print      nitrofurantoin, macrocrystal-monohydrate, (MACROBID) 100 MG capsule Take 1 capsule (100 mg total) by mouth 2 (two) times daily. for 5 days, Starting Fri 6/22/2018, Until Wed 6/27/2018, Print      phenazopyridine (PYRIDIUM) 200 MG tablet Take 1 tablet (200 mg total) by mouth 3 (three) times daily. for 10 days, Starting Fri 6/22/2018, Until Mon 7/2/2018, Print            Follow-up Information     NORMA Lane. Schedule  an appointment as soon as possible for a visit in 1 day.    Specialty:  Family Medicine  Contact information:  [id number]  [facility]  [facility] [address]  [phone]             [facility]    Specialty:  Emergency Medicine  Why:  As needed, If symptoms worsen  Contact information:  [address]  [facility] [address] [phone]                I discussed with patient and/or family/caretaker that evaluation in the ED does not suggest any emergent or life threatening medical conditions requiring immediate intervention beyond what was provided in the ED, and I believe patient is safe for discharge. Regardless, an unremarkable evaluation in the ED does not preclude the development or presence of a serious or life threatening condition. As such, patient was instructed to return immediately for any worsening or change in current symptoms.    Pre-hypertension/Hypertension: The pt has been informed that they may have pre-hypertension or hypertension based on a blood pressure reading in the ED. I recommend that the pt call the PCP listed on their discharge instructions or a physician of their choice this week to arrange f/u for further evaluation of possible pre-hypertension or hypertension.       MEDICAL DECISION MAKING          Smoking Cessation: The patient is a smoker. The patient was counseled on smoking cessation for: 3 minutes. The patient was counseled on tobacco related health complications. Appropriate patient literature was given to the patient concerning tobacco cessation.          CLINICAL IMPRESSION       ICD-10-CM ICD-9-CM   1. Acute suprapubic pain R10.2 789.09     338.19   2. Acute cystitis with hematuria N30.01 595.0   3. Tobacco abuse counseling Z71.6 V65.42     305.1       Disposition:   Disposition: Discharged  Condition: Stable         Rolando Weinberg NP  06/23/18 0048       Rolando Weinberg NP  06/23/18 0048

## 2018-06-24 LAB — BACTERIA UR CULT: NORMAL

## 2019-01-02 ENCOUNTER — HOSPITAL ENCOUNTER (EMERGENCY)
Facility: HOSPITAL | Age: 24
Discharge: HOME | End: 2019-01-02
Attending: EMERGENCY MEDICINE
Payer: MEDICAID

## 2019-01-02 VITALS
OXYGEN SATURATION: 100 % | BODY MASS INDEX: 25.21 KG/M2 | TEMPERATURE: 99 F | DIASTOLIC BLOOD PRESSURE: 71 MMHG | HEIGHT: 63 IN | SYSTOLIC BLOOD PRESSURE: 144 MMHG | RESPIRATION RATE: 20 BRPM | WEIGHT: 142.31 LBS | HEART RATE: 86 BPM

## 2019-01-02 DIAGNOSIS — S39.92XA INJURY OF COCCYX, INITIAL ENCOUNTER: Primary | ICD-10-CM

## 2019-01-02 DIAGNOSIS — M54.50 LOWER BACK PAIN: ICD-10-CM
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
## 2019-01-02 PROCEDURE — 99283 EMERGENCY DEPT VISIT LOW MDM: CPT | Mod: 25

## 2019-01-02 RX ORDER — IBUPROFEN 600 MG/1
600 TABLET ORAL EVERY 6 HOURS PRN
Qty: 20 TABLET | Refills: 0 | Status: SHIPPED | OUTPATIENT
Start: 2019-01-02 | End: 2019-02-21

## 2019-01-03 NOTE — ED PROVIDER NOTES
SCRIBE #1 NOTE: I, Renita Arteaga, am scribing for, and in the presence of, Herrera Davidson Jr. NP. I have scribed the entire note.       History     Chief Complaint   Patient presents with    Tailbone Pain     patient states she fell sunday night in the bathtub and has been having tailbone pain     Review of patient's allergies indicates:  No Known Allergies      History of Present Illness     HPI    1/2/2019, 10:20 PM  History obtained from the patient      History of Present Illness: April Pearl Khoury is a 23 y.o. female patient with a PMHx of sciatica, scoliosis, OCD, depression who presents to the Emergency Department for evaluation of tailbone prain which onset gradually x4 days ago. Pt states she slipped while bathing her kids and landed on her buttocks. Symptoms are constant and moderate in severity. No mitigating or exacerbating factors reported. Associated sxs include bruising to the area . Patient denies any increased swelling to the area, drainage from the site, n/v/d, fever, chills, weakness, numbness, head injury/trauma, and all other sxs at this time. No prior tx. No further complaints or concerns at this time.         Arrival mode: Personal vehicle    PCP: NORMA Lane        Past Medical History:  Past Medical History:   Diagnosis Date    ADD (attention deficit disorder)     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 hyperactivity     Depression     OCD (obsessive compulsive disorder)     PTSD (post-traumatic stress disorder)     Sciatica     Scoliosis     Vaginal laceration 12/23/2014       Past Surgical History:  Past Surgical History:   Procedure Laterality Date    Pressure equalizer tubes           Family History:  Family History   Problem Relation Age of Onset    Diabetes Paternal Grandmother     Hypertension Paternal Grandmother     Diabetes Maternal Grandmother     Hypertension Maternal Grandmother        Social History:  Social History     Tobacco Use    Smoking status:  Current Every Day Smoker     Packs/day: 1.00     Types: Cigarettes    Smokeless tobacco: Never Used    Tobacco comment: 20 cigarettes daily    Substance and Sexual Activity    Alcohol use: No     Alcohol/week: 0.0 oz    Drug use: No    Sexual activity: Yes     Partners: Male        Review of Systems     Review of Systems   Constitutional: Negative for chills and fever.   HENT: Negative for sore throat.    Respiratory: Negative for shortness of breath.    Cardiovascular: Negative for chest pain.   Gastrointestinal: Negative for diarrhea, nausea and vomiting.   Genitourinary: Negative for dysuria.   Musculoskeletal: Negative for back pain.        (+) tailbone pain   Skin: Negative for rash.        (+) bruising to the area   (-) increased swelling to the area   (-) drainage from the site   Neurological: Negative for weakness and numbness.        (-) head injury/trauma   Hematological: Does not bruise/bleed easily.   All other systems reviewed and are negative.       Physical Exam     Initial Vitals [01/02/19 2129]   BP Pulse Resp Temp SpO2   (!) 144/71 86 20 98.6 °F (37 °C) 100 %      MAP       --          Physical Exam  Nursing Notes and Vital Signs Reviewed.  Constitutional: Patient is in no acute distress. Well-developed and well-nourished.  Head: Atraumatic. Normocephalic.  Eyes: PERRL. EOM intact. Conjunctivae are not pale. No scleral icterus.  ENT: Mucous membranes are moist. Oropharynx is clear and symmetric.    Neck: Supple. Full ROM. No lymphadenopathy.  Cardiovascular: Regular rate. Regular rhythm. No murmurs, rubs, or gallops. Distal pulses are 2+ and symmetric.  Pulmonary/Chest: No respiratory distress. Clear to auscultation bilaterally. No wheezing or rales.  Abdominal: Soft and non-distended.  There is no tenderness.  No rebound, guarding, or rigidity. Good bowel sounds.  Genitourinary: No CVA tenderness  Musculoskeletal: Moves all extremities. No obvious deformities. No edema. No calf  "tenderness.  Skin: Warm and dry. Ecchymosis noted to R L buttock. Tenderness over R gluteal cleft. No step off.  Neurological:  Alert, awake, and appropriate.  Normal speech.  No acute focal neurological deficits are appreciated.  Psychiatric: Normal affect. Good eye contact. Appropriate in content.     ED Course   Procedures  ED Vital Signs:  Vitals:    01/02/19 2129   BP: (!) 144/71   Pulse: 86   Resp: 20   Temp: 98.6 °F (37 °C)   TempSrc: Oral   SpO2: 100%   Weight: 64.5 kg (142 lb 4.9 oz)   Height: 5' 3" (1.6 m)       Abnormal Lab Results:  Labs Reviewed - No data to display     All Lab Results:      Imaging Results:  Imaging Results          X-Ray Sacrum And Coccyx (Final result)  Result time 01/02/19 23:33:42    Final result by Bennett Sebastian MD (01/02/19 23:33:42)                 Impression:      Negative study.      Electronically signed by: Asim Sebastian MD  Date:    01/02/2019  Time:    23:33             Narrative:    EXAMINATION:  XR SACRUM AND COCCYX    CLINICAL HISTORY:  Low back pain    TECHNIQUE:  Two or three views of the sacrum and coccyx were performed.    COMPARISON:  None    FINDINGS:  The sacrum and coccyx appear intact with no fracture or focal lesion demonstrated.                                      The Emergency Provider reviewed the vital signs and test results, which are outlined above.     ED Discussion     11:49 PM: Reassessed pt at this time.  Pt states her condition has improved at this time. Discussed with pt all pertinent ED information and results. Discussed pt dx and plan of tx. Gave pt all f/u and return to the ED instructions. All questions and concerns were addressed at this time. Pt expresses understanding of information and instructions, and is comfortable with plan to discharge. Pt is stable for discharge.        ED Medication(s):  Medications - No data to display  Current Discharge Medication List            Medication List      START taking these medications  "   ibuprofen 600 MG tablet  Commonly known as:  ADVIL,MOTRIN  Take 1 tablet (600 mg total) by mouth every 6 (six) hours as needed for Pain.        ASK your doctor about these medications    ketorolac 10 mg tablet  Commonly known as:  TORADOL  Take 1 tablet (10 mg total) by mouth every 6 (six) hours as needed for Pain.     PRENATAL GUMMY ORAL     promethazine 25 MG tablet  Commonly known as:  PHENERGAN  Take 0.5 tablets (12.5 mg total) by mouth every 6 (six) hours as needed for Nausea.           Where to Get Your Medications      These medications were sent [place] - [address]  [address], [facility] [address]    Phone:  [phone]   · ibuprofen 600 MG tablet           Follow-up Information     NORMA Lane In 1 week.    Specialty:  Family Medicine  Contact information:  [address]   [address]  [facility]  [facility] [address]  [phone]                                     Scribe Attestation:   Scribe #1: I performed the above scribed service and the documentation accurately describes the services I performed. I attest to the accuracy of the note.     Attending:   Physician Attestation Statement for Scribe #1: I, Herrera Davidson Jr., NP, personally performed the services described in this documentation, as scribed by Renita Arteaga, in my presence, and it is both accurate and complete.           Clinical Impression       ICD-10-CM ICD-9-CM   1. Injury of coccyx, initial encounter S39.92XA 959.19   2. Lower back pain M54.5 724.2   3. Fall, initial encounter W19.XXXA E888.9               Herrera Davidson Jr., REUBENP  01/03/19 0159

## 2019-02-21 ENCOUNTER — PROCEDURE VISIT (OUTPATIENT)
Dept: OBSTETRICS AND GYNECOLOGY | Facility: CLINIC | Age: 24
End: 2019-02-21
Payer: MEDICAID

## 2019-02-21 VITALS
DIASTOLIC BLOOD PRESSURE: 70 MMHG | WEIGHT: 141.56 LBS | HEIGHT: 63 IN | SYSTOLIC BLOOD PRESSURE: 124 MMHG | BODY MASS INDEX: 25.08 KG/M2

## 2019-02-21 DIAGNOSIS — Z30.46 ENCOUNTER FOR NEXPLANON REMOVAL: Primary | ICD-10-CM

## 2019-02-21 PROCEDURE — 11982 REMOVE DRUG IMPLANT DEVICE: CPT | Mod: PBBFAC | Performed by: NURSE PRACTITIONER

## 2019-02-21 PROCEDURE — 11982 PR REMOVAL DRUG IMPLANT DEVICE: ICD-10-PCS | Mod: S$PBB,,, | Performed by: NURSE PRACTITIONER

## 2019-02-21 PROCEDURE — 11982 REMOVE DRUG IMPLANT DEVICE: CPT | Mod: S$PBB,,, | Performed by: NURSE PRACTITIONER

## 2019-02-21 RX ORDER — LISDEXAMFETAMINE DIMESYLATE 40 MG/1
40 CAPSULE ORAL DAILY
Refills: 0 | COMMUNITY
Start: 2019-02-19 | End: 2024-01-03

## 2019-02-21 NOTE — PROCEDURES
Procedures     NEXPLANON REMOVAL    Exam: Implanon easily palpated in left upper extremity    Procedure: Skin overlying the device was prepped with alcohol.  2 cc 1% lidocaine without epinephrine was injected subcutaneously.  The skin was then prepped with betadine.  A 3mm incision was made  at the tip of the implant using a #11 scalpel.  The device was pushed toward the incision, grasped with hemostats, and was removed intact.  Hemostasis was achieved with gentle pressure and silver nitrate.  The wound was dressed with a band-aid.  The patient tolerated the procedure well.    Post-procedure counseling: The patient was given pain, fever, and bleeding precautions.  Advised to use tylenol and ibuprofen prn pain.    RTC: prn

## 2020-12-03 ENCOUNTER — HOSPITAL ENCOUNTER (EMERGENCY)
Facility: HOSPITAL | Age: 25
Discharge: HOME | End: 2020-12-03
Attending: EMERGENCY MEDICINE
Payer: MEDICAID

## 2020-12-03 VITALS
RESPIRATION RATE: 20 BRPM | HEART RATE: 94 BPM | SYSTOLIC BLOOD PRESSURE: 127 MMHG | WEIGHT: 138.69 LBS | HEIGHT: 63 IN | BODY MASS INDEX: 24.57 KG/M2 | OXYGEN SATURATION: 96 % | TEMPERATURE: 99 F | DIASTOLIC BLOOD PRESSURE: 63 MMHG
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
## 2020-12-03 DIAGNOSIS — S20.219A CONTUSION OF CHEST WALL, UNSPECIFIED LATERALITY, INITIAL ENCOUNTER: Primary | ICD-10-CM

## 2020-12-03 PROCEDURE — 99283 EMERGENCY DEPT VISIT LOW MDM: CPT | Mod: 25

## 2020-12-03 NOTE — ED PROVIDER NOTES
History      Chief Complaint   Patient presents with    Fall     Pt fell off porch on Saturday. c/o bilateral shoulder, chest, and neck pain. Denies blurred vision or dizziness       Review of patient's allergies indicates:  No Known Allergies     HPI   HPI    12/3/2020, 11:37 AM   History obtained from the patient      History of Present Illness: Kristina Khoury is a 25 y.o. female patient who presents to the Emergency Department for pain to sternum since falling off porch Saturday.  Also lesser pain to shoulders and neck. Symptoms are moderate in severity.     No further complaints or concerns at this time.           PCP: NORMA Lane       Past Medical History:  Past Medical History:   Diagnosis Date    ADD (attention deficit disorder)     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 hyperactivity     Depression     OCD (obsessive compulsive disorder)     PTSD (post-traumatic stress disorder)     Sciatica     Scoliosis     Vaginal laceration 12/23/2014         Past Surgical History:  Past Surgical History:   Procedure Laterality Date    Pressure equalizer tubes             Family History:  Family History   Problem Relation Age of Onset    Diabetes Paternal Grandmother     Hypertension Paternal Grandmother     Diabetes Maternal Grandmother     Hypertension Maternal Grandmother            Social History:  Social History     Tobacco Use    Smoking status: Current Every Day Smoker     Packs/day: 1.00     Types: Cigarettes    Smokeless tobacco: Current User    Tobacco comment: 20 cigarettes daily    Substance and Sexual Activity    Alcohol use: Yes     Alcohol/week: 0.0 standard drinks     Frequency: 2-3 times a week     Drinks per session: 1 or 2     Binge frequency: Never    Drug use: No    Sexual activity: Yes     Partners: Male       ROS     Review of Systems   Constitutional: Negative for chills and fever.   HENT: Negative for facial swelling and trouble swallowing.    Eyes:  "Negative for discharge, redness and visual disturbance.   Respiratory: Negative for chest tightness and shortness of breath.    Cardiovascular: Negative for chest pain and leg swelling.   Gastrointestinal: Negative for diarrhea and vomiting.   Genitourinary: Negative for decreased urine volume and dysuria.   Musculoskeletal: Negative for joint swelling and neck stiffness.   Skin: Negative for rash and wound.   Neurological: Negative for syncope and facial asymmetry.   All other systems reviewed and are negative.      Physical Exam      Initial Vitals [12/03/20 1011]   BP Pulse Resp Temp SpO2   127/63 94 20 98.8 °F (37.1 °C) 96 %      MAP       --         Physical Exam  Vital signs and nursing notes reviewed.  Constitutional: Patient is in NAD. Awake and alert. Well-developed and well-nourished.  Head: Atraumatic. Normocephalic.  Eyes: PERRL. EOM intact. Conjunctivae nl. No scleral icterus.  ENT: Mucous membranes are moist. Oropharynx is clear.  Neck: Supple. No JVD. No lymphadenopathy.  No meningismus  Cardiovascular: Regular rate and rhythm. No murmurs, rubs, or gallops. Distal pulses are 2+ and symmetric.  Pulmonary/Chest: No respiratory distress. Clear to auscultation bilaterally. No wheezing, rales, or rhonchi.  Abdominal: Soft. Non-distended. No TTP. No rebound, guarding, or rigidity. Good bowel sounds.  Genitourinary: No CVA tenderness  Musculoskeletal: Moves all extremities. No edema. No ttp to c/t/l/s spine.  Shoulders with from, no bony ttp.  Point ttp to mid sternum.    Skin: Warm and dry.  Neurological: Awake and alert. No acute focal neurological deficits are appreciated.  Psychiatric: Normal affect. Good eye contact. Appropriate in content.      ED Course          Procedures  ED Vital Signs:  Vitals:    12/03/20 1011   BP: 127/63   Pulse: 94   Resp: 20   Temp: 98.8 °F (37.1 °C)   TempSrc: Oral   SpO2: 96%   Weight: 62.9 kg (138 lb 10.7 oz)   Height: 5' 3" (1.6 m)               Imaging Results:  Imaging " Results          X-Ray Sternum (Final result)  Result time 12/03/20 11:32:27    Final result by Salbador Escobedo MD (12/03/20 11:32:27)                 Impression:      No focal abnormality of the sternum or manubrium identified.      Electronically signed by: Salbador Escobedo MD  Date:    12/03/2020  Time:    11:32             Narrative:    EXAMINATION:  XR STERNUM PA AND LATERAL    CLINICAL HISTORY:  Fall on same level from slipping, tripping and stumbling without subsequent striking against object, initial encounter    TECHNIQUE:  AP and lateral films.    COMPARISON:  None    FINDINGS:  No focal abnormality of the sternum or manubrium identified.                                   The Emergency Provider reviewed the vital signs and test results, which are outlined above.    ED Discussion             Medication(s) given in the ER:  Medications - No data to display        Follow-up Information     NORMA Lane In 2 days.    Specialty: Family Medicine  Contact information:  [id number] [facility] [address]  [facility] [place] [address]  [phone]                          Medication List      ASK your doctor about these medications    VYVANSE 40 MG Cap  Generic drug: lisdexamfetamine                Medical Decision Making        All findings were reviewed with the patient/family in detail.   All remaining questions and concerns were addressed at that time.  Patient/family has been counseled regarding the need for follow-up as well as the indication to return to the emergency room should new or worrisome developments occur.        MDM               Clinical Impression:        ICD-10-CM ICD-9-CM   1. Contusion of chest wall, unspecified laterality, initial encounter  S20.219A 922.1   2. Fall from slip, trip, or stumble, initial encounter  W01.0XXA E885.9             Carley Aggarwal PANayeC  12/05/20 [id number]

## 2023-12-28 ENCOUNTER — TELEPHONE (OUTPATIENT)
Dept: OBSTETRICS AND GYNECOLOGY | Facility: CLINIC | Age: 28
End: 2023-12-28
Payer: MEDICAID

## 2023-12-28 NOTE — TELEPHONE ENCOUNTER
Successfully called patient and rescheduled appointment due to provider canceling.   Patient confirmed appointment date, time, and location.

## 2024-01-03 ENCOUNTER — LAB VISIT (OUTPATIENT)
Dept: LAB | Facility: HOSPITAL | Age: 29
End: 2024-01-03
Payer: MEDICAID

## 2024-01-03 ENCOUNTER — OFFICE VISIT (OUTPATIENT)
Dept: OBSTETRICS AND GYNECOLOGY | Facility: CLINIC | Age: 29
End: 2024-01-03
Payer: MEDICAID

## 2024-01-03 VITALS
BODY MASS INDEX: 22.35 KG/M2 | WEIGHT: 126.13 LBS | HEIGHT: 63 IN | SYSTOLIC BLOOD PRESSURE: 110 MMHG | DIASTOLIC BLOOD PRESSURE: 78 MMHG

## 2024-01-03 DIAGNOSIS — Z32.01 POSITIVE PREGNANCY TEST: Primary | ICD-10-CM

## 2024-01-03 DIAGNOSIS — Z12.4 CERVICAL CANCER SCREENING: ICD-10-CM

## 2024-01-03 DIAGNOSIS — Z87.59 HISTORY OF PRETERM PREMATURE RUPTURE OF MEMBRANES (PPROM): ICD-10-CM

## 2024-01-03 DIAGNOSIS — Z32.01 POSITIVE PREGNANCY TEST: ICD-10-CM

## 2024-01-03 LAB
ABO + RH BLD: NORMAL
AMPHET+METHAMPHET UR QL: NEGATIVE
ANION GAP SERPL CALC-SCNC: 9 MMOL/L (ref 8–16)
B-HCG UR QL: POSITIVE
BARBITURATES UR QL SCN>200 NG/ML: NEGATIVE
BASOPHILS # BLD AUTO: 0.07 K/UL (ref 0–0.2)
BASOPHILS NFR BLD: 0.7 % (ref 0–1.9)
BENZODIAZ UR QL SCN>200 NG/ML: NEGATIVE
BLD GP AB SCN CELLS X3 SERPL QL: NORMAL
BUN SERPL-MCNC: 8 MG/DL (ref 6–20)
BZE UR QL SCN: NEGATIVE
CALCIUM SERPL-MCNC: 9.1 MG/DL (ref 8.7–10.5)
CANNABINOIDS UR QL SCN: ABNORMAL
CHLORIDE SERPL-SCNC: 103 MMOL/L (ref 95–110)
CO2 SERPL-SCNC: 22 MMOL/L (ref 23–29)
CREAT SERPL-MCNC: 0.6 MG/DL (ref 0.5–1.4)
CREAT UR-MCNC: 117 MG/DL (ref 15–325)
CTP QC/QA: YES
DIFFERENTIAL METHOD BLD: ABNORMAL
EOSINOPHIL # BLD AUTO: 0.1 K/UL (ref 0–0.5)
EOSINOPHIL NFR BLD: 0.7 % (ref 0–8)
ERYTHROCYTE [DISTWIDTH] IN BLOOD BY AUTOMATED COUNT: 12.3 % (ref 11.5–14.5)
EST. GFR  (NO RACE VARIABLE): >60 ML/MIN/1.73 M^2
GLUCOSE SERPL-MCNC: 82 MG/DL (ref 70–110)
HAV IGM SERPL QL IA: NORMAL
HBV CORE IGM SERPL QL IA: NORMAL
HBV SURFACE AG SERPL QL IA: NORMAL
HCG INTACT+B SERPL-ACNC: NORMAL MIU/ML
HCT VFR BLD AUTO: 39.6 % (ref 37–48.5)
HCV AB SERPL QL IA: NORMAL
HGB BLD-MCNC: 14.1 G/DL (ref 12–16)
HIV 1+2 AB+HIV1 P24 AG SERPL QL IA: NORMAL
IMM GRANULOCYTES # BLD AUTO: 0.06 K/UL (ref 0–0.04)
IMM GRANULOCYTES NFR BLD AUTO: 0.6 % (ref 0–0.5)
LYMPHOCYTES # BLD AUTO: 3.1 K/UL (ref 1–4.8)
LYMPHOCYTES NFR BLD: 29.6 % (ref 18–48)
MCH RBC QN AUTO: 32.5 PG (ref 27–31)
MCHC RBC AUTO-ENTMCNC: 35.6 G/DL (ref 32–36)
MCV RBC AUTO: 91 FL (ref 82–98)
METHADONE UR QL SCN>300 NG/ML: NEGATIVE
MONOCYTES # BLD AUTO: 0.8 K/UL (ref 0.3–1)
MONOCYTES NFR BLD: 7.4 % (ref 4–15)
NEUTROPHILS # BLD AUTO: 6.4 K/UL (ref 1.8–7.7)
NEUTROPHILS NFR BLD: 61 % (ref 38–73)
NRBC BLD-RTO: 0 /100 WBC
OPIATES UR QL SCN: NEGATIVE
PCP UR QL SCN>25 NG/ML: NEGATIVE
PLATELET # BLD AUTO: 346 K/UL (ref 150–450)
PMV BLD AUTO: 10.3 FL (ref 9.2–12.9)
POTASSIUM SERPL-SCNC: 3.6 MMOL/L (ref 3.5–5.1)
RBC # BLD AUTO: 4.34 M/UL (ref 4–5.4)
SODIUM SERPL-SCNC: 134 MMOL/L (ref 136–145)
SPECIMEN OUTDATE: NORMAL
TOXICOLOGY INFORMATION: ABNORMAL
WBC # BLD AUTO: 10.49 K/UL (ref 3.9–12.7)

## 2024-01-03 PROCEDURE — 1159F MED LIST DOCD IN RCRD: CPT | Mod: CPTII,,,

## 2024-01-03 PROCEDURE — 83020 HEMOGLOBIN ELECTROPHORESIS: CPT

## 2024-01-03 PROCEDURE — 80074 ACUTE HEPATITIS PANEL: CPT

## 2024-01-03 PROCEDURE — 3074F SYST BP LT 130 MM HG: CPT | Mod: CPTII,,,

## 2024-01-03 PROCEDURE — 1160F RVW MEDS BY RX/DR IN RCRD: CPT | Mod: CPTII,,,

## 2024-01-03 PROCEDURE — 3008F BODY MASS INDEX DOCD: CPT | Mod: CPTII,,,

## 2024-01-03 PROCEDURE — 87086 URINE CULTURE/COLONY COUNT: CPT

## 2024-01-03 PROCEDURE — 86762 RUBELLA ANTIBODY: CPT

## 2024-01-03 PROCEDURE — 86901 BLOOD TYPING SEROLOGIC RH(D): CPT

## 2024-01-03 PROCEDURE — 80048 BASIC METABOLIC PNL TOTAL CA: CPT

## 2024-01-03 PROCEDURE — 84702 CHORIONIC GONADOTROPIN TEST: CPT

## 2024-01-03 PROCEDURE — 81025 URINE PREGNANCY TEST: CPT | Mod: PBBFAC

## 2024-01-03 PROCEDURE — 99999PBSHW POCT URINE PREGNANCY: Mod: PBBFAC,,,

## 2024-01-03 PROCEDURE — 87186 SC STD MICRODIL/AGAR DIL: CPT

## 2024-01-03 PROCEDURE — 99203 OFFICE O/P NEW LOW 30 MIN: CPT | Mod: S$PBB,TH,,

## 2024-01-03 PROCEDURE — 80307 DRUG TEST PRSMV CHEM ANLYZR: CPT

## 2024-01-03 PROCEDURE — 87389 HIV-1 AG W/HIV-1&-2 AB AG IA: CPT

## 2024-01-03 PROCEDURE — 3078F DIAST BP <80 MM HG: CPT | Mod: CPTII,,,

## 2024-01-03 PROCEDURE — 99212 OFFICE O/P EST SF 10 MIN: CPT | Mod: PBBFAC,TH

## 2024-01-03 PROCEDURE — 99999PBSHW PR PBB SHADOW TECHNICAL ONLY FILED TO HB: Mod: PBBFAC,,,

## 2024-01-03 PROCEDURE — 36415 COLL VENOUS BLD VENIPUNCTURE: CPT

## 2024-01-03 PROCEDURE — 86592 SYPHILIS TEST NON-TREP QUAL: CPT

## 2024-01-03 PROCEDURE — 85025 COMPLETE CBC W/AUTO DIFF WBC: CPT

## 2024-01-03 PROCEDURE — 87088 URINE BACTERIA CULTURE: CPT

## 2024-01-03 PROCEDURE — 87077 CULTURE AEROBIC IDENTIFY: CPT

## 2024-01-03 PROCEDURE — 88175 CYTOPATH C/V AUTO FLUID REDO: CPT

## 2024-01-03 PROCEDURE — 87491 CHLMYD TRACH DNA AMP PROBE: CPT

## 2024-01-03 PROCEDURE — 99999 PR PBB SHADOW E&M-EST. PATIENT-LVL II: CPT | Mod: PBBFAC,,,

## 2024-01-03 RX ORDER — CARIPRAZINE 1.5 MG/1
1.5 CAPSULE, GELATIN COATED ORAL
COMMUNITY
Start: 2024-01-02

## 2024-01-03 RX ORDER — MIRTAZAPINE 15 MG/1
15 TABLET, FILM COATED ORAL NIGHTLY
COMMUNITY
Start: 2023-12-07

## 2024-01-03 RX ORDER — LISDEXAMFETAMINE DIMESYLATE 60 MG/1
60 CAPSULE ORAL EVERY MORNING
COMMUNITY
Start: 2023-12-07

## 2024-01-03 NOTE — PROGRESS NOTES
Subjective:       Patient ID: April Pearl Khoury is a 28 y.o. female.    Chief Complaint:  Possible Pregnancy      History of Present Illness  HPI  Missed Menses/ Possible Pregnancy  Patient complains of amenorrhea. She believes she could be pregnant. Patient is ambivalent about pregnancy. Sexual Activity: none. Current symptoms also include: breast tenderness, fatigue, frequent urination, morning sickness, nausea, and positive home pregnancy test. Last period was normal.     EDC based on LMP: 2024  EGA: 7 weeks      x2   PROM at 32 weeks with second pregnancy       Patient's last menstrual period was 11/15/2023.     GYN & OB History  Patient's last menstrual period was 11/15/2023.   Date of Last Pap: No result found    OB History    Para Term  AB Living   2 2 1 1   1   SAB IAB Ectopic Multiple Live Births         0 2      # Outcome Date GA Lbr Naseem/2nd Weight Sex Delivery Anes PTL Lv   2  17 32w1d  1.65 kg (3 lb 10.2 oz) F Vag-Spont EPI Y    1 Term 14 40w0d  3.43 kg (7 lb 9 oz) M Vag-Spont   DAKOTA       Review of Systems  Review of Systems   Constitutional:  Positive for fatigue.   Gastrointestinal:  Positive for nausea.   Genitourinary:  Positive for frequency.   Integumentary:  Positive for breast tenderness.   Breast: Positive for tenderness.          Objective:      Physical Exam:   Constitutional: She is oriented to person, place, and time. She appears well-developed and well-nourished. No distress.    HENT:   Head: Normocephalic and atraumatic.    Eyes: Pupils are equal, round, and reactive to light. Conjunctivae and EOM are normal.     Cardiovascular:  Normal rate.             Pulmonary/Chest: Effort normal.        Abdominal: Soft. She exhibits no distension. There is no abdominal tenderness. There is no rebound and no guarding. Hernia confirmed negative in the right inguinal area and confirmed negative in the left inguinal area.     Genitourinary:    Inguinal canal,  vagina, uterus, right adnexa and left adnexa normal.   Rectum:      No external hemorrhoid.      Pelvic exam was performed with patient supine.   The external female genitalia was normal.   No external genitalia lesions identified,Genitalia hair distrobution normal .     Labial bartholins normal.There is no rash, tenderness, lesion or injury on the right labia. There is no rash, tenderness, lesion or injury on the left labia. Cervix is normal. Right adnexum displays no mass, no tenderness and no fullness. Left adnexum displays no mass, no tenderness and no fullness. No erythema, tenderness or bleeding in the vagina.    No foreign body in the vagina.      No signs of injury in the vagina.   Vagina was moist.Cervix exhibits no motion tenderness, no lesion, no friability, no tenderness and no polyp.    pap smear completedUerus contour normal  Uterus is not enlarged and not tender. Normal urethral meatus.Urethral Meatus exhibits: urethral lesionUrethra findings: no urethral mass, no tenderness, no urethral scarring and prolapsedBladder findings: no bladder distention and no bladder tenderness          Musculoskeletal: Normal range of motion and moves all extremeties.      Lymphadenopathy: No inguinal adenopathy noted on the right or left side.    Neurological: She is alert and oriented to person, place, and time.    Skin: Skin is warm and dry. No rash noted. She is not diaphoretic. No erythema. No pallor.    Psychiatric: She has a normal mood and affect. Her behavior is normal. Judgment and thought content normal.             Assessment:        1. Positive pregnancy test    2. Cervical cancer screening    3. History of  premature rupture of membranes (PPROM)               Plan:   Encouraged to start MV with Folic acid  OB labs ordered  US and F/U with midwife scheduled     Diagnosis and orders this visit:  Positive pregnancy test  -     C. trachomatis/N. gonorrhoeae by AMP DNA  -     CBC Auto Differential;  Future; Expected date: 2024  -     HIV 1/2 Ag/Ab (4th Gen); Future; Expected date: 2024  -     POCT urine pregnancy  -     RPR; Future; Expected date: 2024  -     Rubella Antibody, IgG; Future; Expected date: 2024  -     Type & Screen; Future; Expected date: 2024  -     Urine culture  -     US OB/GYN Procedure ([facility]); Future; Expected date: 2024  -     Basic Metabolic Panel; Future; Expected date: 2024  -     Hepatitis Panel, Acute; Future; Expected date: 2024  -     Drug screen panel, in-house  -     Hemoglobin Electrophoresis,Hgb A2 Dwayne.; Future; Expected date: 2024  -     HCG, Quantitative; Future; Expected date: 2024    Cervical cancer screening  -     Liquid-Based Pap Smear, Screening    History of  premature rupture of membranes (PPROM)           Neris Smith, NP

## 2024-01-04 LAB
C TRACH DNA SPEC QL NAA+PROBE: NOT DETECTED
N GONORRHOEA DNA SPEC QL NAA+PROBE: NOT DETECTED
RPR SER QL: NORMAL
RUBV IGG SER-ACNC: 40.3 IU/ML
RUBV IGG SER-IMP: REACTIVE

## 2024-01-05 LAB
BACTERIA UR CULT: ABNORMAL
CLINICAL INFO: NORMAL
CYTO CVX: NORMAL
CYTOLOGIST CVX/VAG CYTO: NORMAL
CYTOLOGIST CVX/VAG CYTO: NORMAL
CYTOLOGY CMNT CVX/VAG CYTO-IMP: NORMAL
CYTOLOGY PAP THIN PREP EXPLANATION: NORMAL
DATE OF PREVIOUS PAP: NO
DATE PREVIOUS BX: NO
GEN CATEG CVX/VAG CYTO-IMP: NORMAL
LMP START DATE: NORMAL
MICROORGANISM CVX/VAG CYTO: NORMAL
PATHOLOGIST CVX/VAG CYTO: NORMAL
SERVICE CMNT-IMP: NORMAL
SPECIMEN SOURCE CVX/VAG CYTO: NORMAL
STAT OF ADQ CVX/VAG CYTO-IMP: NORMAL

## 2024-01-07 LAB
HGB A2 MFR BLD HPLC: 2.8 % (ref 2.2–3.2)
HGB FRACT BLD ELPH-IMP: NORMAL
HGB FRACT BLD ELPH-IMP: NORMAL

## 2024-01-08 DIAGNOSIS — N39.0 E. COLI UTI (URINARY TRACT INFECTION): Primary | ICD-10-CM

## 2024-01-08 DIAGNOSIS — B96.20 E. COLI UTI (URINARY TRACT INFECTION): Primary | ICD-10-CM

## 2024-01-08 RX ORDER — AMOXICILLIN AND CLAVULANATE POTASSIUM 875; 125 MG/1; MG/1
1 TABLET, FILM COATED ORAL EVERY 12 HOURS
Qty: 14 TABLET | Refills: 0 | Status: SHIPPED | OUTPATIENT
Start: 2024-01-08 | End: 2024-01-15

## 2024-01-17 ENCOUNTER — INITIAL PRENATAL (OUTPATIENT)
Dept: OBSTETRICS AND GYNECOLOGY | Facility: CLINIC | Age: 29
End: 2024-01-17
Payer: MEDICAID

## 2024-01-17 ENCOUNTER — PROCEDURE VISIT (OUTPATIENT)
Dept: OBSTETRICS AND GYNECOLOGY | Facility: CLINIC | Age: 29
End: 2024-01-17
Payer: MEDICAID

## 2024-01-17 VITALS
WEIGHT: 135.56 LBS | SYSTOLIC BLOOD PRESSURE: 104 MMHG | BODY MASS INDEX: 24.02 KG/M2 | DIASTOLIC BLOOD PRESSURE: 66 MMHG

## 2024-01-17 DIAGNOSIS — F17.200 SMOKER: ICD-10-CM

## 2024-01-17 DIAGNOSIS — Z3A.08 8 WEEKS GESTATION OF PREGNANCY: Primary | ICD-10-CM

## 2024-01-17 DIAGNOSIS — Z34.81 ENCOUNTER FOR SUPERVISION OF OTHER NORMAL PREGNANCY, FIRST TRIMESTER: ICD-10-CM

## 2024-01-17 DIAGNOSIS — Z32.01 POSITIVE PREGNANCY TEST: ICD-10-CM

## 2024-01-17 DIAGNOSIS — O26.819 PREGNANCY RELATED FATIGUE, ANTEPARTUM: ICD-10-CM

## 2024-01-17 DIAGNOSIS — R68.89 COLD INTOLERANCE: ICD-10-CM

## 2024-01-17 DIAGNOSIS — G47.09 OTHER INSOMNIA: ICD-10-CM

## 2024-01-17 DIAGNOSIS — Z87.59 HISTORY OF PRETERM PREMATURE RUPTURE OF MEMBRANES (PPROM): ICD-10-CM

## 2024-01-17 DIAGNOSIS — F12.10 TETRAHYDROCANNABINOL (THC) USE DISORDER, MILD, ABUSE: ICD-10-CM

## 2024-01-17 PROCEDURE — 99212 OFFICE O/P EST SF 10 MIN: CPT | Mod: PBBFAC,TH

## 2024-01-17 PROCEDURE — 76801 OB US < 14 WKS SINGLE FETUS: CPT | Mod: PBBFAC | Performed by: OBSTETRICS & GYNECOLOGY

## 2024-01-17 PROCEDURE — 99213 OFFICE O/P EST LOW 20 MIN: CPT | Mod: TH,S$PBB,,

## 2024-01-17 PROCEDURE — 99999 PR PBB SHADOW E&M-EST. PATIENT-LVL II: CPT | Mod: PBBFAC,,,

## 2024-01-18 PROBLEM — Z34.81 ENCOUNTER FOR SUPERVISION OF OTHER NORMAL PREGNANCY, FIRST TRIMESTER: Status: ACTIVE | Noted: 2024-01-18

## 2024-01-18 NOTE — PROGRESS NOTES
Chief Complaint   Patient presents with    Initial Prenatal Visit       28 y.o.,  at 8w4d by US on 24    Patient presents today for initial prenatal visit.    Complaints today: .  Doing well overall.   Does  have complaints of being overly fatigued and cold.  H/H WNL. Will check thyroid panel today  Reviewed US findings today of CL 26.5mm. discussed findings with Dr. Montaño. CL was measured too early and does not give adequate information.  Will re-measure CL at recommended times of 16, 18, 20w. If progesterone or cerclage are needed will evaluate at that time.  Could potentially obtain CL at 14w as well. Patient verbalized understanding of information relayed.   ROS  OBSTETRICS:   Cramping No   Bleeding No    GASTRO:   Nausea Yes   Vomiting No      OB History    Para Term  AB Living   3 2 1 1   2   SAB IAB Ectopic Multiple Live Births         0 2      # Outcome Date GA Lbr Naseem/2nd Weight Sex Delivery Anes PTL Lv   3 Current            2  17 32w1d  1.65 kg (3 lb 10.2 oz) F Vag-Spont EPI Y DAKOTA   1 Term 14 40w0d  3.43 kg (7 lb 9 oz) M Vag-Spont   DAKOTA       Allergies and problem list reviewed and updated  Medical and surgical history reviewed    PHYSICAL EXAM  /66   Wt 61.5 kg (135 lb 9.3 oz)   LMP 11/15/2023   BMI 24.02 kg/m²     GENERAL: No acute distress  NEURO: Alert and oriented x3  PSYCH: Calm, normal mood and affect      ASSESSMENT AND PLAN    8 weeks gestation of pregnancy  - routine PNC   History of  premature rupture of membranes (PPROM)  - delivery at 32w   - CL at 16, 18, 20w  Smoker  - vapes  - advised cessation  Tetrahydrocannabinol (THC) use disorder, mild, abuse  - reports using THC gummies to aid in anxiety and sleep  - advised cessation. Patient verbalized understanding   Pregnancy related fatigue, antepartum  -     TSH; Future; Expected date: 2024  -     T3; Future; Expected date: 2024  -     T4, Free; Future; Expected date:  01/17/2024    Cold intolerance  -     TSH; Future; Expected date: 01/17/2024  -     T3; Future; Expected date: 01/17/2024  -     T4, Free; Future; Expected date: 01/17/2024    Other insomnia  - THC gummies  - advised cessation. Patient verbalized understanding           Initial labs reviewed: H/H 14.1/39.6, HIV Negative, RPR negative, blood type: A+, GC/CT Negative  Dating u/s reviewed: SVIUP with cardiac activity, , CAIN 8/24/24, EGA 8w4d  Urine culture  Abnormal - did not  rx. Resent to pharmacy.  Counseled today on proper weight gain based on the [place] [employer]'s recommendations based on her pre-pregnancy weight. Reviewed potential risks to excessive weight gain.  - Discussed IOM recommended weight gain of:   Weight category Pre pre BMI  Recommended TWG   Underweight Less than 18.5 28-40    Normal Weight 18.5-24.9  25-35    Overweight 25-29.9  15-25    Obese   30 and greater  11-20      Pre-pregnancy BMI over 40 or excess pregnancy weight gain defined as:   Pre-preg BMI < 18.5; Excess weight gain = > 60 pound   Pre-preg BMI 18.5-24.9;  Excess weight gain = > 53 pounds   Pre-preg BMI 25-29.9;  Excess weight gain = > 38 pounds   Pre-preg BMI > 30;  Excess weight gain = > 30 pounds      Discussed prenatal vitamin options (i.e. stool softener, DHA).    Pregnancy course discussed and 'AtoZ' book given. Patient was counseled on proper weight gain based on the [place] [employer]'s recommendations based on her pre-pregnancy weight. Discussed foods to avoid in pregnancy (i.e. sushi, fish that are high in mercury, deli meat, and unpasteurized cheeses). Discussed prenatal vitamin options (i.e. stool softener, DHA). Discussed potential medical problems in pregnancy.  Oriented to practice including CNM collaboration.   Questions answered.    Reviewed aneuploidy screening options and indications, questions answered - patient will consider but didn't get it done with other children.  Education regarding  warning signs.      Follow up: 4 wks, call or present sooner prn.

## 2024-02-08 ENCOUNTER — TELEPHONE (OUTPATIENT)
Dept: OBSTETRICS AND GYNECOLOGY | Facility: CLINIC | Age: 29
End: 2024-02-08
Payer: MEDICAID

## 2024-02-08 NOTE — TELEPHONE ENCOUNTER
----- Message from Sky Brandon sent at 2/8/2024  2:06 PM CST -----  Contact: April  Pt is calling in regards to getting a call back to discuss questions and concerns about the medications mirtazapine (REMERON) 15 MG tablet and  VRAYLAR 1.5 mg Cap whether or not is it ok for her to take.  Pt also states she would like to discuss getting something called in for nausea due to she has not been feeling well since the pregnancy started.  Please call back at  [phone].          Thanks

## 2024-02-08 NOTE — TELEPHONE ENCOUNTER
Pt is calling in regards to getting a call back to discuss questions and concerns about the medications mirtazapine (REMERON) 15 MG tablet and  VRAYLAR 1.5 mg Cap whether or not is it ok for her to take.  Pt also states she would like to discuss getting something called in for nausea due to she has not been feeling well since the pregnancy started  Message sent to midwife to address.     Lou CUEVAS LPN  OB/GYN

## 2024-02-08 NOTE — TELEPHONE ENCOUNTER
----- Message from Sky Brandon sent at 2/8/2024  2:06 PM CST -----  Contact: April  Pt is calling in regards to getting a call back to discuss questions and concerns about the medications mirtazapine (REMERON) 15 MG tablet and  VRAYLAR 1.5 mg Cap whether or not is it ok for her to take.  Pt also states she would like to discuss getting something called in for nausea due to she has not been feeling well since the pregnancy started.  Please call back at  [phone].          Thanks

## 2024-02-12 ENCOUNTER — ROUTINE PRENATAL (OUTPATIENT)
Dept: OBSTETRICS AND GYNECOLOGY | Facility: CLINIC | Age: 29
End: 2024-02-12
Payer: MEDICAID

## 2024-02-12 VITALS
SYSTOLIC BLOOD PRESSURE: 114 MMHG | DIASTOLIC BLOOD PRESSURE: 60 MMHG | BODY MASS INDEX: 24.41 KG/M2 | WEIGHT: 137.81 LBS

## 2024-02-12 DIAGNOSIS — Z87.59 HISTORY OF PRETERM PREMATURE RUPTURE OF MEMBRANES (PPROM): Primary | ICD-10-CM

## 2024-02-12 DIAGNOSIS — F31.9 BIPOLAR DEPRESSION: ICD-10-CM

## 2024-02-12 DIAGNOSIS — O21.9 NAUSEA AND VOMITING IN PREGNANCY: ICD-10-CM

## 2024-02-12 DIAGNOSIS — Z34.81 ENCOUNTER FOR SUPERVISION OF OTHER NORMAL PREGNANCY, FIRST TRIMESTER: ICD-10-CM

## 2024-02-12 DIAGNOSIS — G90.A POTS (POSTURAL ORTHOSTATIC TACHYCARDIA SYNDROME): ICD-10-CM

## 2024-02-12 PROCEDURE — 99214 OFFICE O/P EST MOD 30 MIN: CPT | Mod: TH,S$PBB,, | Performed by: ADVANCED PRACTICE MIDWIFE

## 2024-02-12 PROCEDURE — 99999 PR PBB SHADOW E&M-EST. PATIENT-LVL II: CPT | Mod: PBBFAC,,, | Performed by: ADVANCED PRACTICE MIDWIFE

## 2024-02-12 PROCEDURE — 99212 OFFICE O/P EST SF 10 MIN: CPT | Mod: PBBFAC,TH | Performed by: ADVANCED PRACTICE MIDWIFE

## 2024-02-12 NOTE — PROGRESS NOTES
28 y.o. female  at 12w2d   denies VB or cramping  Continues to have n/v-RX zofran    TW lbs   Reviewed prenatal labs   Genetic testing-need to discuss next visit    Enrolled in connected moms    History of  premature rupture of membranes (PPROM)  -     US OB/GYN Procedure ([facility])-Future; Standing  -     Connected MOM Enrollment Order  CL 16, 18, 20 weeks-scheduled    Encounter for supervision of other normal pregnancy, first trimester    POTS (postural orthostatic tachycardia syndrome)  -     Ambulatory referral/consult to Cardiology; Future; Expected date: 2024  Discussed symptoms could be worse with pregnancy and dehydration due to n/v in first trimester, will refer to cardiology to be established    Bipolar depression  Currently taking vraylar and remeron  Discussed vraylar can be associated with withdrawal of the  if taken in 3rd tri  Can continue remeron  Needs to discuss with psychiatrist but should continue both medications for now    Nausea and vomiting in pregnancy  -     ondansetron (ZOFRAN-ODT) 4 MG TbDL; Take 1 tablet (4 mg total) by mouth 2 (two) times daily.  Dispense: 30 tablet; Refill: 1         Reviewed warning signs, pregnancy precautions and how/when to call.  RTC x 4 wks, call or present sooner prn.

## 2024-02-13 ENCOUNTER — PATIENT MESSAGE (OUTPATIENT)
Dept: ADMINISTRATIVE | Facility: OTHER | Age: 29
End: 2024-02-13
Payer: MEDICAID

## 2024-02-13 RX ORDER — ONDANSETRON 4 MG/1
4 TABLET, ORALLY DISINTEGRATING ORAL 2 TIMES DAILY
Qty: 30 TABLET | Refills: 1 | Status: SHIPPED | OUTPATIENT
Start: 2024-02-13

## 2024-02-14 ENCOUNTER — TELEPHONE (OUTPATIENT)
Dept: OBSTETRICS AND GYNECOLOGY | Facility: CLINIC | Age: 29
End: 2024-02-14
Payer: MEDICAID

## 2024-02-14 NOTE — TELEPHONE ENCOUNTER
----- Message from Luz Marina Stallings sent at 2/14/2024  1:06 PM CST -----  Contact: April April is calling to speak to the nurse regarding leyva syndrome, she been feeling very dizzy about to pass out, please give her a call at     Thanks  LJ

## 2024-02-14 NOTE — TELEPHONE ENCOUNTER
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 Message sent to midwife in clinic to get recommendations. Will call patient back with recommendations. Patient verbalized understanding.

## 2024-03-09 ENCOUNTER — PATIENT MESSAGE (OUTPATIENT)
Dept: OTHER | Facility: OTHER | Age: 29
End: 2024-03-09
Payer: MEDICAID

## 2024-03-11 ENCOUNTER — ROUTINE PRENATAL (OUTPATIENT)
Dept: OBSTETRICS AND GYNECOLOGY | Facility: CLINIC | Age: 29
End: 2024-03-11
Payer: MEDICAID

## 2024-03-11 ENCOUNTER — PROCEDURE VISIT (OUTPATIENT)
Dept: OBSTETRICS AND GYNECOLOGY | Facility: CLINIC | Age: 29
End: 2024-03-11
Payer: MEDICAID

## 2024-03-11 VITALS
BODY MASS INDEX: 26.79 KG/M2 | SYSTOLIC BLOOD PRESSURE: 116 MMHG | DIASTOLIC BLOOD PRESSURE: 70 MMHG | WEIGHT: 151.25 LBS

## 2024-03-11 DIAGNOSIS — F31.9 BIPOLAR DEPRESSION: ICD-10-CM

## 2024-03-11 DIAGNOSIS — Z87.59 HISTORY OF PRETERM PREMATURE RUPTURE OF MEMBRANES (PPROM): ICD-10-CM

## 2024-03-11 DIAGNOSIS — Z87.59 HISTORY OF PRETERM PREMATURE RUPTURE OF MEMBRANES (PPROM): Primary | ICD-10-CM

## 2024-03-11 DIAGNOSIS — G90.A POTS (POSTURAL ORTHOSTATIC TACHYCARDIA SYNDROME): ICD-10-CM

## 2024-03-11 PROCEDURE — 99999 PR PBB SHADOW E&M-EST. PATIENT-LVL II: CPT | Mod: PBBFAC,,, | Performed by: ADVANCED PRACTICE MIDWIFE

## 2024-03-11 PROCEDURE — 76817 TRANSVAGINAL US OBSTETRIC: CPT | Mod: PBBFAC | Performed by: OBSTETRICS & GYNECOLOGY

## 2024-03-11 PROCEDURE — 99212 OFFICE O/P EST SF 10 MIN: CPT | Mod: PBBFAC,25 | Performed by: ADVANCED PRACTICE MIDWIFE

## 2024-03-11 PROCEDURE — 99214 OFFICE O/P EST MOD 30 MIN: CPT | Mod: TH,S$PBB,, | Performed by: ADVANCED PRACTICE MIDWIFE

## 2024-03-11 RX ORDER — LANOLIN ALCOHOL/MO/W.PET/CERES
50 CREAM (GRAM) TOPICAL DAILY
COMMUNITY

## 2024-03-11 NOTE — PROGRESS NOTES
28 y.o. female  at 16w2d   feeling flutters, denies VB, LOF or cramping  Doing well overall, increasing episodes of heart palpitations. Has cardio appt 3/21    TWG: 15 lbs   Genetic testing declined  Anatomy scan ordered    US today: CL 34mm, cervix appears long and closed, FHT     History of  premature rupture of membranes (PPROM)  Continue CL q2 weeks through 22 weeks    POTS (postural orthostatic tachycardia syndrome)  Has cardio appt 3/21    Bipolar depression  Stable on meds       Reviewed warning signs, pregnancy precautions and how/when to call.  RTC x 4 wks, call or present sooner prn.

## 2024-03-16 ENCOUNTER — PATIENT MESSAGE (OUTPATIENT)
Dept: OTHER | Facility: OTHER | Age: 29
End: 2024-03-16
Payer: MEDICAID

## 2024-03-25 ENCOUNTER — PROCEDURE VISIT (OUTPATIENT)
Dept: OBSTETRICS AND GYNECOLOGY | Facility: CLINIC | Age: 29
End: 2024-03-25
Payer: MEDICAID

## 2024-03-25 DIAGNOSIS — Z87.59 HISTORY OF PRETERM PREMATURE RUPTURE OF MEMBRANES (PPROM): ICD-10-CM

## 2024-03-25 PROCEDURE — 76817 TRANSVAGINAL US OBSTETRIC: CPT | Mod: PBBFAC | Performed by: OBSTETRICS & GYNECOLOGY

## 2024-04-06 ENCOUNTER — PATIENT MESSAGE (OUTPATIENT)
Dept: OTHER | Facility: OTHER | Age: 29
End: 2024-04-06
Payer: MEDICAID

## 2024-04-11 ENCOUNTER — PROCEDURE VISIT (OUTPATIENT)
Dept: OBSTETRICS AND GYNECOLOGY | Facility: CLINIC | Age: 29
End: 2024-04-11
Payer: MEDICAID

## 2024-04-11 ENCOUNTER — ROUTINE PRENATAL (OUTPATIENT)
Dept: OBSTETRICS AND GYNECOLOGY | Facility: CLINIC | Age: 29
End: 2024-04-11
Payer: MEDICAID

## 2024-04-11 ENCOUNTER — TELEPHONE (OUTPATIENT)
Dept: OBSTETRICS AND GYNECOLOGY | Facility: CLINIC | Age: 29
End: 2024-04-11
Payer: MEDICAID

## 2024-04-11 VITALS
SYSTOLIC BLOOD PRESSURE: 116 MMHG | DIASTOLIC BLOOD PRESSURE: 68 MMHG | BODY MASS INDEX: 27.42 KG/M2 | WEIGHT: 154.75 LBS

## 2024-04-11 DIAGNOSIS — Z87.59 HISTORY OF PRETERM PREMATURE RUPTURE OF MEMBRANES (PPROM): ICD-10-CM

## 2024-04-11 DIAGNOSIS — Z36.2 ENCOUNTER FOR FOLLOW-UP ULTRASOUND OF FETAL ANATOMY: Primary | ICD-10-CM

## 2024-04-11 DIAGNOSIS — G90.A POTS (POSTURAL ORTHOSTATIC TACHYCARDIA SYNDROME): ICD-10-CM

## 2024-04-11 PROCEDURE — 76811 OB US DETAILED SNGL FETUS: CPT | Mod: 26,S$PBB,, | Performed by: OBSTETRICS & GYNECOLOGY

## 2024-04-11 PROCEDURE — 99999 PR PBB SHADOW E&M-EST. PATIENT-LVL II: CPT | Mod: PBBFAC,,, | Performed by: ADVANCED PRACTICE MIDWIFE

## 2024-04-11 PROCEDURE — 76817 TRANSVAGINAL US OBSTETRIC: CPT | Mod: PBBFAC | Performed by: OBSTETRICS & GYNECOLOGY

## 2024-04-11 PROCEDURE — 99212 OFFICE O/P EST SF 10 MIN: CPT | Mod: PBBFAC,TH,25 | Performed by: ADVANCED PRACTICE MIDWIFE

## 2024-04-11 PROCEDURE — 99214 OFFICE O/P EST MOD 30 MIN: CPT | Mod: TH,S$PBB,, | Performed by: ADVANCED PRACTICE MIDWIFE

## 2024-04-11 NOTE — TELEPHONE ENCOUNTER
----- Message from Catalina Pickard sent at 4/11/2024 11:41 AM CDT -----  Contact: APril  Patient is running 10 mins late for upcoming ultrasound/obgyn appt. Can be reached at .[phone].

## 2024-04-11 NOTE — PROGRESS NOTES
28 y.o. female  at 20w5d   feeling flutters/FM, denies VB, LOF or cramping  Doing well without concerns   TW lbs     Reviewed anatomy US-normal fetal anatomy with no obvious abnormalities. Suboptimal views of heart. S=D. Normal amniotic fluid, normal placental location in posterior position, no evidence of previa. Normal appearing cervical length via TVUS at 37.1mm. female gender. 3VC.       Encounter for follow-up ultrasound of fetal anatomy  -     US OB/GYN Procedure ([facility])-Future; Future    POTS (postural orthostatic tachycardia syndrome)  Did 2 weeks of holter monitor, seeing cardio for f/u in 4 days    History of  premature rupture of membranes (PPROM)    CL WNL today, repeat 2 weeks    Reviewed warning signs, normal FM,  labor precautions and how/when to call.  RTC x 2 wks, call or present sooner prn.

## 2024-04-24 ENCOUNTER — ROUTINE PRENATAL (OUTPATIENT)
Dept: OBSTETRICS AND GYNECOLOGY | Facility: CLINIC | Age: 29
End: 2024-04-24
Payer: MEDICAID

## 2024-04-24 ENCOUNTER — PROCEDURE VISIT (OUTPATIENT)
Dept: OBSTETRICS AND GYNECOLOGY | Facility: CLINIC | Age: 29
End: 2024-04-24
Payer: MEDICAID

## 2024-04-24 VITALS
SYSTOLIC BLOOD PRESSURE: 116 MMHG | WEIGHT: 160.25 LBS | BODY MASS INDEX: 28.39 KG/M2 | DIASTOLIC BLOOD PRESSURE: 64 MMHG

## 2024-04-24 DIAGNOSIS — Z36.2 ENCOUNTER FOR FOLLOW-UP ULTRASOUND OF FETAL ANATOMY: ICD-10-CM

## 2024-04-24 DIAGNOSIS — O26.892 BACK PAIN DURING PREGNANCY IN SECOND TRIMESTER: ICD-10-CM

## 2024-04-24 DIAGNOSIS — M54.9 BACK PAIN DURING PREGNANCY IN SECOND TRIMESTER: ICD-10-CM

## 2024-04-24 DIAGNOSIS — F31.9 BIPOLAR DEPRESSION: ICD-10-CM

## 2024-04-24 DIAGNOSIS — Z34.81 ENCOUNTER FOR SUPERVISION OF OTHER NORMAL PREGNANCY, FIRST TRIMESTER: Primary | ICD-10-CM

## 2024-04-24 DIAGNOSIS — Z87.59 HISTORY OF PRETERM PREMATURE RUPTURE OF MEMBRANES (PPROM): ICD-10-CM

## 2024-04-24 PROCEDURE — 99214 OFFICE O/P EST MOD 30 MIN: CPT | Mod: TH,S$PBB,, | Performed by: MIDWIFE

## 2024-04-24 PROCEDURE — 76817 TRANSVAGINAL US OBSTETRIC: CPT | Mod: PBBFAC | Performed by: OBSTETRICS & GYNECOLOGY

## 2024-04-24 PROCEDURE — 99212 OFFICE O/P EST SF 10 MIN: CPT | Mod: PBBFAC,TH,25 | Performed by: MIDWIFE

## 2024-04-24 PROCEDURE — 99999 PR PBB SHADOW E&M-EST. PATIENT-LVL II: CPT | Mod: PBBFAC,,, | Performed by: MIDWIFE

## 2024-04-24 RX ORDER — CYCLOBENZAPRINE HCL 10 MG
10 TABLET ORAL 3 TIMES DAILY PRN
Qty: 60 TABLET | Refills: 0 | Status: SHIPPED | OUTPATIENT
Start: 2024-04-24

## 2024-04-24 RX ORDER — FLUOXETINE 10 MG/1
10 CAPSULE ORAL DAILY
Qty: 30 CAPSULE | Refills: 1 | Status: SHIPPED | OUTPATIENT
Start: 2024-04-24 | End: 2024-06-23

## 2024-04-24 NOTE — PROGRESS NOTES
29 y.o. female  at 22w4d   Reports + FM, denies VB, LOF, or cramping  Doing ok, not sleeping at night, has no help at home,  is not helping at home, she works as well, also c/o low back pain and low abd pain, discussed T2 complaints and rec, belly band, rx for Flexeril, also feels she is very depressed and would like to add an antidepressant to her current meds, she has taken prozac before, will try that, also rec PT if no relief w/ meds and sleep, will call if needs referral to PT  TW lbs   Cervical length 37.3 mm  Encounter for supervision of other normal pregnancy, first trimester    History of  premature rupture of membranes (PPROM)    Back pain during pregnancy in second trimester  -     cyclobenzaprine (FLEXERIL) 10 MG tablet; Take 1 tablet (10 mg total) by mouth 3 (three) times daily as needed for Muscle spasms.  Dispense: 60 tablet; Refill: 0    Bipolar depression  -     FLUoxetine 10 MG capsule; Take 1 capsule (10 mg total) by mouth once daily.  Dispense: 30 capsule; Refill: 1      Reviewed warning signs, normal FM,  labor precautions and how/when to call.  RTC x 4 wks, call or present sooner prn.

## 2024-05-04 ENCOUNTER — PATIENT MESSAGE (OUTPATIENT)
Dept: OTHER | Facility: OTHER | Age: 29
End: 2024-05-04
Payer: MEDICAID

## 2024-05-18 ENCOUNTER — PATIENT MESSAGE (OUTPATIENT)
Dept: OTHER | Facility: OTHER | Age: 29
End: 2024-05-18
Payer: MEDICAID

## 2024-06-01 ENCOUNTER — PATIENT MESSAGE (OUTPATIENT)
Dept: OTHER | Facility: OTHER | Age: 29
End: 2024-06-01
Payer: MEDICAID

## 2024-06-04 ENCOUNTER — ROUTINE PRENATAL (OUTPATIENT)
Dept: OBSTETRICS AND GYNECOLOGY | Facility: CLINIC | Age: 29
End: 2024-06-04
Payer: MEDICAID

## 2024-06-04 VITALS
DIASTOLIC BLOOD PRESSURE: 70 MMHG | WEIGHT: 175.94 LBS | SYSTOLIC BLOOD PRESSURE: 108 MMHG | BODY MASS INDEX: 31.16 KG/M2

## 2024-06-04 DIAGNOSIS — M54.9 BACK PAIN AFFECTING PREGNANCY IN THIRD TRIMESTER: ICD-10-CM

## 2024-06-04 DIAGNOSIS — F31.9 BIPOLAR DEPRESSION: ICD-10-CM

## 2024-06-04 DIAGNOSIS — O99.891 BACK PAIN AFFECTING PREGNANCY IN THIRD TRIMESTER: ICD-10-CM

## 2024-06-04 DIAGNOSIS — Z34.83 ENCOUNTER FOR SUPERVISION OF OTHER NORMAL PREGNANCY, THIRD TRIMESTER: Primary | ICD-10-CM

## 2024-06-04 DIAGNOSIS — Z23 NEED FOR TDAP VACCINATION: ICD-10-CM

## 2024-06-04 PROCEDURE — 99213 OFFICE O/P EST LOW 20 MIN: CPT | Mod: PBBFAC,TH | Performed by: ADVANCED PRACTICE MIDWIFE

## 2024-06-04 PROCEDURE — 90471 IMMUNIZATION ADMIN: CPT | Mod: PBBFAC

## 2024-06-04 PROCEDURE — 90715 TDAP VACCINE 7 YRS/> IM: CPT | Mod: PBBFAC

## 2024-06-04 PROCEDURE — 99999PBSHW PR PBB SHADOW TECHNICAL ONLY FILED TO HB: Mod: PBBFAC,,,

## 2024-06-04 PROCEDURE — 99999 PR PBB SHADOW E&M-EST. PATIENT-LVL III: CPT | Mod: PBBFAC,,, | Performed by: ADVANCED PRACTICE MIDWIFE

## 2024-06-04 PROCEDURE — 99214 OFFICE O/P EST MOD 30 MIN: CPT | Mod: TH,S$PBB,, | Performed by: ADVANCED PRACTICE MIDWIFE

## 2024-06-04 RX ADMIN — TETANUS TOXOID, REDUCED DIPHTHERIA TOXOID AND ACELLULAR PERTUSSIS VACCINE, ADSORBED 0.5 ML: 5; 2.5; 8; 8; 2.5 SUSPENSION INTRAMUSCULAR at 02:06

## 2024-06-04 NOTE — PROGRESS NOTES
29 y.o. female  at 28w3d   Reports + FM, denies VB, LOF or CTX  Continues to have pain in her back and hips. States it feels like it's her spine. Will refer to PT and continue to monitor closely.    TW lbs   28wk labs today (A POS)  Tdap given today    Birth control options discussed=BTL consents signed today  Will start visits every 2 weeks    Encounter for supervision of other normal pregnancy, third trimester  -     OB Glucose Screen; Future; Expected date: 2024  -     CBC auto differential; Future; Expected date: 2024  -     HIV 1/2 Ag/Ab (4th Gen); Future; Expected date: 2024  -     Treponema Pallidium Antibodies IgG, IgM; Future; Expected date: 2024    Need for Tdap vaccination  -     Tdap (BOOSTRIX) vaccine injection 0.5 mL    Back pain affecting pregnancy in third trimester  -     Ambulatory referral/consult to Physical/Occupational Therapy; Future; Expected date: 2024    Bipolar depression  Stable on prozac        Reviewed warning signs, normal FKCs,  labor precautions and how/when to call.  RTC x 2 wks, call or present sooner prn.

## 2024-06-04 NOTE — PROGRESS NOTES
Patient in clinic today for TDAP   Two pt identifiers identified   Patient notified to wait 15 minutes after recieiving injection, patient verbalized understanding.   Joe Way MA administered IM to patients right deltoid ventrogluteal.  Patient tolerated well.

## 2024-06-05 ENCOUNTER — LAB VISIT (OUTPATIENT)
Dept: LAB | Facility: HOSPITAL | Age: 29
End: 2024-06-05
Attending: ADVANCED PRACTICE MIDWIFE
Payer: MEDICAID

## 2024-06-05 ENCOUNTER — CLINICAL SUPPORT (OUTPATIENT)
Dept: REHABILITATION | Facility: HOSPITAL | Age: 29
End: 2024-06-05
Payer: MEDICAID

## 2024-06-05 DIAGNOSIS — O99.891 BACK PAIN AFFECTING PREGNANCY IN THIRD TRIMESTER: ICD-10-CM

## 2024-06-05 DIAGNOSIS — M53.86 DECREASED ROM OF LUMBAR SPINE: Primary | ICD-10-CM

## 2024-06-05 DIAGNOSIS — M54.9 BACK PAIN AFFECTING PREGNANCY IN THIRD TRIMESTER: ICD-10-CM

## 2024-06-05 DIAGNOSIS — M54.50 CHRONIC MIDLINE LOW BACK PAIN WITHOUT SCIATICA: ICD-10-CM

## 2024-06-05 DIAGNOSIS — Z34.83 ENCOUNTER FOR SUPERVISION OF OTHER NORMAL PREGNANCY, THIRD TRIMESTER: ICD-10-CM

## 2024-06-05 DIAGNOSIS — G89.29 CHRONIC MIDLINE LOW BACK PAIN WITHOUT SCIATICA: ICD-10-CM

## 2024-06-05 DIAGNOSIS — R53.1 GENERAL WEAKNESS: ICD-10-CM

## 2024-06-05 LAB
BASOPHILS # BLD AUTO: ABNORMAL K/UL (ref 0–0.2)
BASOPHILS NFR BLD: 1 % (ref 0–1.9)
DIFFERENTIAL METHOD BLD: ABNORMAL
EOSINOPHIL # BLD AUTO: ABNORMAL K/UL (ref 0–0.5)
EOSINOPHIL NFR BLD: 0 % (ref 0–8)
ERYTHROCYTE [DISTWIDTH] IN BLOOD BY AUTOMATED COUNT: 12.4 % (ref 11.5–14.5)
GIANT PLATELETS BLD QL SMEAR: PRESENT
GLUCOSE SERPL-MCNC: 132 MG/DL (ref 70–140)
HCT VFR BLD AUTO: 30.5 % (ref 37–48.5)
HGB BLD-MCNC: 10.2 G/DL (ref 12–16)
HIV 1+2 AB+HIV1 P24 AG SERPL QL IA: NORMAL
IMM GRANULOCYTES # BLD AUTO: ABNORMAL K/UL (ref 0–0.04)
IMM GRANULOCYTES NFR BLD AUTO: ABNORMAL % (ref 0–0.5)
LYMPHOCYTES # BLD AUTO: ABNORMAL K/UL (ref 1–4.8)
LYMPHOCYTES NFR BLD: 17 % (ref 18–48)
MCH RBC QN AUTO: 32.3 PG (ref 27–31)
MCHC RBC AUTO-ENTMCNC: 33.4 G/DL (ref 32–36)
MCV RBC AUTO: 97 FL (ref 82–98)
METAMYELOCYTES NFR BLD MANUAL: 1 %
MONOCYTES # BLD AUTO: ABNORMAL K/UL (ref 0.3–1)
MONOCYTES NFR BLD: 0 % (ref 4–15)
NEUTROPHILS NFR BLD: 80 % (ref 38–73)
NEUTS BAND NFR BLD MANUAL: 1 %
NRBC BLD-RTO: 0 /100 WBC
PLATELET # BLD AUTO: 210 K/UL (ref 150–450)
PLATELET BLD QL SMEAR: ABNORMAL
PMV BLD AUTO: 10.8 FL (ref 9.2–12.9)
RBC # BLD AUTO: 3.16 M/UL (ref 4–5.4)
TREPONEMA PALLIDUM IGG+IGM AB [PRESENCE] IN SERUM OR PLASMA BY IMMUNOASSAY: NONREACTIVE
WBC # BLD AUTO: 14.23 K/UL (ref 3.9–12.7)

## 2024-06-05 PROCEDURE — 97110 THERAPEUTIC EXERCISES: CPT | Mod: PN

## 2024-06-05 PROCEDURE — 85027 COMPLETE CBC AUTOMATED: CPT | Performed by: ADVANCED PRACTICE MIDWIFE

## 2024-06-05 PROCEDURE — 85007 BL SMEAR W/DIFF WBC COUNT: CPT | Performed by: ADVANCED PRACTICE MIDWIFE

## 2024-06-05 PROCEDURE — 97161 PT EVAL LOW COMPLEX 20 MIN: CPT | Mod: PN

## 2024-06-05 PROCEDURE — 86593 SYPHILIS TEST NON-TREP QUANT: CPT | Performed by: ADVANCED PRACTICE MIDWIFE

## 2024-06-05 PROCEDURE — 87389 HIV-1 AG W/HIV-1&-2 AB AG IA: CPT | Performed by: ADVANCED PRACTICE MIDWIFE

## 2024-06-05 PROCEDURE — 36415 COLL VENOUS BLD VENIPUNCTURE: CPT | Performed by: ADVANCED PRACTICE MIDWIFE

## 2024-06-05 PROCEDURE — 82950 GLUCOSE TEST: CPT | Performed by: ADVANCED PRACTICE MIDWIFE

## 2024-06-05 PROCEDURE — 97014 ELECTRIC STIMULATION THERAPY: CPT | Mod: PN

## 2024-06-05 NOTE — PLAN OF CARE
[facility]   Physical Therapy Initial Evaluation      Date: 6/5/2024   Name: Kristina Khoury  [facility] Number: [id number]    Therapy Diagnosis:    Encounter Diagnoses   Name Primary?    Back pain affecting pregnancy in third trimester     Decreased ROM of lumbar spine Yes    General weakness     Chronic midline low back pain without sciatica       Physician: Raysa Hernandez C*     Physician Orders: PT Eval and Treat  Medical Diagnosis from Referral: O99.891,M54.9 (ICD-10-CM) - Back pain affecting pregnancy in third trimester  Evaluation Date: 6/5/2024  Authorization Period Expiration: 06/05/2026  Plan of Care Expiration: 8/5/2024  Progress Note Due: 7/5/2024  Visit # / Visits authorized: 1/1   FOTO: 1/3 (last performed on 6/5/2024)    Precautions: Standard    Time In: 1405  Time Out: 1505  Total Billable Time (timed & untimed codes): 60 minutes    Subjective     Date of onset: 3 months    History of current condition - April reports that she began having lower back pain in her second trimester. She reports that pain being constant with the pain getting worse over the last few week    Falls: none    Imaging: [] Xray [] MRI [] CT: Performed on:     Pain:  Current 7/10, worst 9/10, best 6/10   Location: [] Right   [] Left:  mid lower back and bilateral hip  Description: tight and pressure  Aggravating Factors: sleeping on side and back, walking bend over   Easing Factors: activity avoidance, rest,     Prior Therapy:   [] N/A    [] Yes:   Social History: Pt lives with their family  Occupation: Pt currently on leave  Prior Level of Function: Independent and pain free with all ADL, IADL, community mobility and functional activities.   Current Level of Function: Unable to walk long distances, requires multiple rest breaks when performing ADLs. Limited in community activity due to pain.      Dominant Extremity:    [x] Right    [] Left    Pts goals: Pt reported goals are to decrease  overall pain levels in order to return to prior functional level.     Medical History:   Past Medical History:   Diagnosis Date    ADD (attention deficit disorder)     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 hyperactivity     Depression     OCD (obsessive compulsive disorder)     PTSD (post-traumatic stress disorder)     Sciatica     Scoliosis     Vaginal laceration 12/23/2014       Surgical History:   Kristina Khoury  has a past surgical history that includes Pressure equalizer tubes.    Medications:   April has a current medication list which includes the following prescription(s): cyclobenzaprine, ferrous sulfate, fluoxetine, mirtazapine, ondansetron, prenatal vit/iron fum/folic ac, pyridoxine (vitamin b6), vraylar, and vyvanse.    Allergies:   Review of patient's allergies indicates:  No Known Allergies     Objective        Lumbar Range of Motion:    % Limitation Pain   Flexion 25   yes        Extension 25   yes        Left Side Bending 0 yes        Right Side Bending 0 yes        Left rotation   25 yes        Right Rotation   25 yes                 STRENGTH:   L/E MMT Right  (spine) Left Pain/Dysfunction with Movement Goal   Modified (90/90) Abdominal Strength  NT due to pain ---     Hip Flexion  4/5 4/5  4+/5 B   Hip Extension  NT NT  4+/5 B   Hip Abduction  4/5 4/5  4+/5 B   Knee Extension 4+/5 4+/5  5/5 B   Knee Flexion 4+/5 4+/5  5/5 B   Ankle DF 5/5 5/5  5/5 B   Ankle PF 5/5 5/5  5/5 B        Hip AROM/PROM Right Left Pain/Dysfunction with Movement Goal   Hip Flexion (120º) 120 120  120   Hip Extension (30º) NT NT  30   Hip Abduction (45º) 45 45  45   Hip IR (45º) 45 30  45   Hip ER (45º) 45 45  45       MUSCLE LENGTH:   Muscle Tested  Right Left  Limitation Goal   Hamstrings  [] Normal  [x] Limited [] Normal  [x] Limited  Normal B   Gastrocnemius  [] Normal  [x] Limited [] Normal  [x] Limited  Normal B   Soleus  [] Normal  [x] Limited [] Normal  [x] Limited  Normal B        Joint mobility: Pain at  SIJ and /c left PSIS elevated            SENSATION  [x] Intact to Light Touch   [] Impaired:      PALPATION: Structures: Increased tenderness to palpation of: bilateral , LOWER STRUCTURES : PSIS, and sacrum      POSTURE:  Pt presents with postural abnormalities which include:    [x] Forward Head   [] Increased Lumbar Lordosis   [x] Rounded Shoulder   [] Genu Recurvatum   [] Increased Thoracic Kyphosis [] Genu Valgus   [] Trunk Deviated    [] Pes Planus   [] Scapular Winging    [] Other:             Function:     Intake Outcome Measure for FOTO Lumbar Spine Survey    Therapist reviewed FOTO scores for April on 6/5/2024.   FOTO report - see Media section or FOTO account for episode details    Intake Score: 41%         Treatment     Total Treatment time (time-based codes) separate from Evaluation: (15) minutes     April received the treatments listed below:      THERAPEUTIC EXERCISES to develop strength, endurance, ROM, flexibility, posture, and core stabilization for (15) minutes including:    Discussed and performed HEP    Heat and estim10'    Patient Education and Home Exercises     Education provided: (5) minutes  PURPOSE: Patient educated on the impairments noted above and the effects of physical therapy intervention to improve overall condition and QOL.   EXERCISE: Patient was educated on all the above exercise prior/during/after for proper posture, positioning, and execution for safe performance with home exercise program.   STRENGTH: Patient educated on the importance of improved core and extremity strength in order to improve alignment of the spine and extremities with static positions and dynamic movement.     Written Home Exercises Provided: yes.  Exercises were reviewed and April was able to demonstrate them prior to the end of the session.  April demonstrated good  understanding of the education provided. See EMR under Patient Instructions for exercises provided during therapy sessions.    Assessment  "    April is a 29 y.o. female referred to outpatient Physical Therapy with a medical diagnosis of O99.891,M54.9 (ICD-10-CM) - Back pain affecting pregnancy in third trimester. Pt presents with impairments in the following categories: IMPAIRMENTS: ROM, strength, endurance, and core strength and stability    Pt prognosis is Excellent  Pt will benefit from skilled outpatient Physical Therapy to address the deficits stated above and in the chart below, provide pt/family education, and to maximize pt's level of independence.     Plan of care discussed with patient: Yes  Pt's spiritual, cultural and educational needs considered and patient is agreeable to the plan of care and goals as stated below:     Anticipated Barriers for therapy: none    Medical Necessity is demonstrated by the following  History  Co-morbidities and personal factors that may impact the plan of care [] LOW: no personal factors / co-morbidities  [x] MODERATE: 1-2 personal factors / co-morbidities  [] HIGH: 3+ personal factors / co-morbidities    Moderate / High Support Documentation:   Past Medical History:   Diagnosis Date    ADD (attention deficit disorder)     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 hyperactivity     Depression     OCD (obsessive compulsive disorder)     PTSD (post-traumatic stress disorder)     Sciatica     Scoliosis     Vaginal laceration 12/23/2014        Examination  Body Structures and Functions, activity limitations and participation restrictions that may impact the plan of care [x] LOW: addressing 1-2 elements  [] MODERATE: 3+ elements  [] HIGH: 4+ elements (please support below)    Moderate / High Support Documentation: See above in "Current Level of Function"      Clinical Presentation [x] LOW: stable  [] MODERATE: Evolving  [] HIGH: Unstable     Decision Making/ Complexity Score: low         Short Term Goals:  4 weeks Status  Date Met   PAIN: Pt will report worst pain of 4/10 in order to progress toward max functional " ability and improve quality of life. [x] Progressing  [] Met  [] Not Met    FUNCTION: Patient will demonstrate improved function as indicated by a score of greater than or equal to 58 out of 100 on FOTO. [x] Progressing  [] Met  [] Not Met    MOBILITY: Patient will improve AROM to 50% of stated goals, listed in objective measures above, in order to progress towards independence with functional activities.  [x] Progressing  [] Met  [] Not Met    STRENGTH: Patient will improve strength to 50% of stated goals, listed in objective measures above, in order to progress towards independence with functional activities. [x] Progressing  [] Met  [] Not Met    POSTURE: Patient will correct postural deviations in sitting and standing, to decrease pain and promote long term stability.  [x] Progressing  [] Met  [] Not Met    HEP: Patient will demonstrate independence with HEP in order to progress toward functional independence. [x] Progressing  [] Met  [] Not Met      Long Term Goals:  8 weeks Status Date Met   PAIN: Pt will report worst pain of 1/10 in order to progress toward max functional ability and improve quality of life [x] Progressing  [] Met  [] Not Met    FUNCTION: Patient will demonstrate improved function as indicated by a score of greater than or equal to 57 out of 100 on FOTO. [x] Progressing  [] Met  [] Not Met    MOBILITY: Patient will improve AROM to stated goals, listed in objective measures above, in order to return to maximal functional potential and improve quality of life.  [x] Progressing  [] Met  [] Not Met    STRENGTH: Patient will improve strength to stated goals, listed in objective measures above, in order to improve functional independence and quality of life.  [x] Progressing  [] Met  [] Not Met    Patient will return to normal ADL's, IADL's, community involvement, recreational activities, and work-related activities with less than or equal to 1/10 pain and maximal function.  [x] Progressing  []  Met  [] Not Met      Plan     Plan of care Certification: 6/5/2024 to 8/5/2024.    Outpatient Physical Therapy 2 times weekly for 8 weeks to include any combination of the following interventions: virtual visits, dry needling, modalities, electrical stimulation (IFC, Pre-Mod, Attended with Functional Dry Needling), Cervical/Lumbar Traction, Gait Training, Manual Therapy, Neuromuscular Re-ed, Patient Education, Self Care, Therapeutic Activities, Therapeutic Exercise, and Therapeutic Activites     Mt Garza, PT, DPT

## 2024-06-06 DIAGNOSIS — O99.013 ANEMIA IN PREGNANCY, THIRD TRIMESTER: Primary | ICD-10-CM

## 2024-06-06 PROBLEM — M53.86 DECREASED ROM OF LUMBAR SPINE: Status: ACTIVE | Noted: 2024-06-06

## 2024-06-06 PROBLEM — M54.50 CHRONIC MIDLINE LOW BACK PAIN: Status: ACTIVE | Noted: 2024-06-06

## 2024-06-06 PROBLEM — R53.1 GENERAL WEAKNESS: Status: ACTIVE | Noted: 2024-06-06

## 2024-06-06 PROBLEM — G89.29 CHRONIC MIDLINE LOW BACK PAIN: Status: ACTIVE | Noted: 2024-06-06

## 2024-06-06 RX ORDER — FERROUS SULFATE 324(65)MG
324 TABLET, DELAYED RELEASE (ENTERIC COATED) ORAL DAILY
Qty: 30 TABLET | Refills: 5 | Status: SHIPPED | OUTPATIENT
Start: 2024-06-06

## 2024-06-12 ENCOUNTER — CLINICAL SUPPORT (OUTPATIENT)
Dept: REHABILITATION | Facility: HOSPITAL | Age: 29
End: 2024-06-12
Payer: MEDICAID

## 2024-06-12 DIAGNOSIS — R53.1 GENERAL WEAKNESS: ICD-10-CM

## 2024-06-12 DIAGNOSIS — M54.50 CHRONIC MIDLINE LOW BACK PAIN WITHOUT SCIATICA: ICD-10-CM

## 2024-06-12 DIAGNOSIS — G89.29 CHRONIC MIDLINE LOW BACK PAIN WITHOUT SCIATICA: ICD-10-CM

## 2024-06-12 DIAGNOSIS — M53.86 DECREASED ROM OF LUMBAR SPINE: Primary | ICD-10-CM

## 2024-06-12 PROCEDURE — 97110 THERAPEUTIC EXERCISES: CPT | Mod: PN

## 2024-06-12 NOTE — PROGRESS NOTES
[facility]   Physical Therapy Treatment Note      Name: Kristina Khoury  Clinic Number: [id number]    Therapy Diagnosis:   Encounter Diagnoses   Name Primary?    Decreased ROM of lumbar spine Yes    General weakness     Chronic midline low back pain without sciatica      Physician: Raysa Hernandez C*    Visit Date: 6/12/2024       Physician Orders: PT Eval and Treat  Medical Diagnosis from Referral: O99.891,M54.9 (ICD-10-CM) - Back pain affecting pregnancy in third trimester  Evaluation Date: 6/5/2024  Authorization Period Expiration: 06/05/2026  Plan of Care Expiration: 8/5/2024  Progress Note Due: 7/5/2024  Visit # / Visits authorized: 1/8 + eval  FOTO: 1/3 (last performed on 6/5/2024)    Precautions: Standard     Time In: 1:35  Time Out: 2:20  Total Billable Time (timed & untimed codes): 45 minutes    PTA Visit #: 0/5       Subjective     Patient reports: continued lower back pain as well as round ligament pain.  She was compliant with home exercise program.  Response to previous treatment: 1st treatment since initial evaluation  Functional change: no change    Pain: -/10 (not asked today)  Location: lower back    Objective      Objective Measures updated at progress report unless specified.     Treatment     April received the treatments listed below:      therapeutic exercises to develop strength, endurance, ROM, flexibility, and core stabilization for 20 minutes including:    Bike 6 minutes   LTRs   Seated therapy ball exercises- posterior pelvic tilts, lateral tilts, circles CW/CCW  Seated on therapy ball leaning froward + Diaphragmatic breathing + counter pressure to sacrum     manual therapy techniques: Myofacial release were applied to the: right piriformis for 20 minutes, including:    Myofascial release to right piriformis         Patient Education and Home Exercises       Education provided:   - HEP provided.     Written Home Exercises Provided: Patient instructed to  cont prior HEP. Exercises were reviewed and April was able to demonstrate them prior to the end of the session.  April demonstrated good  understanding of the education provided. See Electronic Medical Record under Patient Instructions for exercises provided during therapy sessions    Assessment     April presents to therapy today with continued lower back pain. Focused on Diaphragmatic breathing and pelvic mobility. Addressed hip pain with myofascial release to right piriformis. Patient tolerated manual treatment well. Patient reporting less tension at the end of session.     April Is progressing well towards her goals.   Patient prognosis is Good.     Patient will continue to benefit from skilled outpatient physical therapy to address the deficits listed in the problem list box on initial evaluation, provide pt/family education and to maximize pt's level of independence in the home and community environment.     Patient's spiritual, cultural and educational needs considered and pt agreeable to plan of care and goals.     Anticipated barriers to physical therapy: see PMHx          Short Term Goals:  4 weeks Status  Date Met   PAIN: Pt will report worst pain of 4/10 in order to progress toward max functional ability and improve quality of life. [x] Progressing  [] Met  [] Not Met     FUNCTION: Patient will demonstrate improved function as indicated by a score of greater than or equal to 58 out of 100 on FOTO. [x] Progressing  [] Met  [] Not Met     MOBILITY: Patient will improve AROM to 50% of stated goals, listed in objective measures above, in order to progress towards independence with functional activities.  [x] Progressing  [] Met  [] Not Met     STRENGTH: Patient will improve strength to 50% of stated goals, listed in objective measures above, in order to progress towards independence with functional activities. [x] Progressing  [] Met  [] Not Met     POSTURE: Patient will correct postural deviations in  sitting and standing, to decrease pain and promote long term stability.  [x] Progressing  [] Met  [] Not Met     HEP: Patient will demonstrate independence with HEP in order to progress toward functional independence. [x] Progressing  [] Met  [] Not Met        Long Term Goals:  8 weeks Status Date Met   PAIN: Pt will report worst pain of 1/10 in order to progress toward max functional ability and improve quality of life [x] Progressing  [] Met  [] Not Met     FUNCTION: Patient will demonstrate improved function as indicated by a score of greater than or equal to 57 out of 100 on FOTO. [x] Progressing  [] Met  [] Not Met     MOBILITY: Patient will improve AROM to stated goals, listed in objective measures above, in order to return to maximal functional potential and improve quality of life.  [x] Progressing  [] Met  [] Not Met     STRENGTH: Patient will improve strength to stated goals, listed in objective measures above, in order to improve functional independence and quality of life.  [x] Progressing  [] Met  [] Not Met     Patient will return to normal ADL's, IADL's, community involvement, recreational activities, and work-related activities with less than or equal to 1/10 pain and maximal function.  [x] Progressing  [] Met  [] Not Met        Plan      Plan of care Certification: 6/5/2024 to 8/5/2024.     Outpatient Physical Therapy 2 times weekly for 8 weeks to include any combination of the following interventions: virtual visits, dry needling, modalities, electrical stimulation (IFC, Pre-Mod, Attended with Functional Dry Needling), Cervical/Lumbar Traction, Gait Training, Manual Therapy, Neuromuscular Re-ed, Patient Education, Self Care, Therapeutic Activities, Therapeutic Exercise, and Therapeutic Activites     Binu Patel, PT

## 2024-06-17 ENCOUNTER — TELEPHONE (OUTPATIENT)
Dept: REHABILITATION | Facility: HOSPITAL | Age: 29
End: 2024-06-17
Payer: MEDICAID

## 2024-06-21 ENCOUNTER — ROUTINE PRENATAL (OUTPATIENT)
Dept: OBSTETRICS AND GYNECOLOGY | Facility: CLINIC | Age: 29
End: 2024-06-21
Payer: MEDICAID

## 2024-06-21 VITALS
DIASTOLIC BLOOD PRESSURE: 62 MMHG | SYSTOLIC BLOOD PRESSURE: 116 MMHG | BODY MASS INDEX: 31.24 KG/M2 | WEIGHT: 176.38 LBS

## 2024-06-21 DIAGNOSIS — O99.013 ANEMIA IN PREGNANCY, THIRD TRIMESTER: Primary | ICD-10-CM

## 2024-06-21 DIAGNOSIS — Z36.2 ENCOUNTER FOR FOLLOW-UP ULTRASOUND OF FETAL ANATOMY: ICD-10-CM

## 2024-06-21 DIAGNOSIS — Z34.80 ENCOUNTER FOR SUPERVISION OF NORMAL PREGNANCY IN MULTIGRAVIDA: ICD-10-CM

## 2024-06-21 PROCEDURE — 99999 PR PBB SHADOW E&M-EST. PATIENT-LVL III: CPT | Mod: PBBFAC,,, | Performed by: ADVANCED PRACTICE MIDWIFE

## 2024-06-21 PROCEDURE — 99213 OFFICE O/P EST LOW 20 MIN: CPT | Mod: PBBFAC,TH | Performed by: ADVANCED PRACTICE MIDWIFE

## 2024-06-21 NOTE — PROGRESS NOTES
29 y.o. female  at 30w6d   Reports + FM, denies VB, LOF or CTX  C/O pelvic pressure, FFN discarded as cervix is LTC    TW lbs   Reviewed 28wk lab results (A POS)  Anemia takes iron when she remembers  Passed GTT  Tdap received    Anemia in pregnancy, third trimester    Encounter for supervision of normal pregnancy in multigravida    Encounter for follow-up ultrasound of fetal anatomy  -     US OB/GYN Procedure ([facility])-Future; Future  Completion of anatomy and growth scan, S>D       Reviewed warning signs, normal FKCs,  labor precautions and how/when to call.  RTC x 2 wks, call or present sooner prn.

## 2024-06-29 ENCOUNTER — PATIENT MESSAGE (OUTPATIENT)
Dept: OTHER | Facility: OTHER | Age: 29
End: 2024-06-29
Payer: MEDICAID

## 2024-07-02 ENCOUNTER — ANESTHESIA EVENT (OUTPATIENT)
Dept: OBSTETRICS AND GYNECOLOGY | Facility: HOSPITAL | Age: 29
End: 2024-07-02

## 2024-07-02 ENCOUNTER — ANESTHESIA (OUTPATIENT)
Dept: OBSTETRICS AND GYNECOLOGY | Facility: HOSPITAL | Age: 29
End: 2024-07-02

## 2024-07-02 ENCOUNTER — HOSPITAL ENCOUNTER (OUTPATIENT)
Facility: HOSPITAL | Age: 29
Discharge: HOME | End: 2024-07-03
Attending: OBSTETRICS & GYNECOLOGY | Admitting: OBSTETRICS & GYNECOLOGY
Payer: MEDICAID

## 2024-07-02 ENCOUNTER — ROUTINE PRENATAL (OUTPATIENT)
Dept: OBSTETRICS AND GYNECOLOGY | Facility: CLINIC | Age: 29
End: 2024-07-02
Payer: MEDICAID

## 2024-07-02 VITALS — WEIGHT: 181.75 LBS | SYSTOLIC BLOOD PRESSURE: 110 MMHG | BODY MASS INDEX: 32.2 KG/M2 | DIASTOLIC BLOOD PRESSURE: 60 MMHG

## 2024-07-02 DIAGNOSIS — Z34.80 ENCOUNTER FOR SUPERVISION OF NORMAL PREGNANCY IN MULTIGRAVIDA: Primary | ICD-10-CM

## 2024-07-02 DIAGNOSIS — F17.200 SMOKER: ICD-10-CM

## 2024-07-02 DIAGNOSIS — O34.33 PREMATURE CERVICAL DILATION, THIRD TRIMESTER: ICD-10-CM

## 2024-07-02 DIAGNOSIS — O47.03 THREATENED PRETERM LABOR, THIRD TRIMESTER: Primary | ICD-10-CM

## 2024-07-02 DIAGNOSIS — O99.013 ANEMIA IN PREGNANCY, THIRD TRIMESTER: ICD-10-CM

## 2024-07-02 DIAGNOSIS — Z87.59 HISTORY OF PRETERM PREMATURE RUPTURE OF MEMBRANES (PPROM): ICD-10-CM

## 2024-07-02 DIAGNOSIS — O23.43 URINARY TRACT INFECTION IN MOTHER DURING THIRD TRIMESTER OF PREGNANCY: ICD-10-CM

## 2024-07-02 LAB
ABO + RH BLD: NORMAL
ALBUMIN SERPL BCP-MCNC: 2.9 G/DL (ref 3.5–5.2)
ALP SERPL-CCNC: 126 U/L (ref 55–135)
ALT SERPL W/O P-5'-P-CCNC: 10 U/L (ref 10–44)
ANION GAP SERPL CALC-SCNC: 11 MMOL/L (ref 8–16)
AST SERPL-CCNC: 18 U/L (ref 10–40)
BACTERIA #/AREA URNS AUTO: ABNORMAL /HPF
BACTERIA #/AREA URNS HPF: ABNORMAL /HPF
BASOPHILS NFR BLD: 0 % (ref 0–1.9)
BILIRUB SERPL-MCNC: 0.2 MG/DL (ref 0.1–1)
BILIRUB UR QL STRIP: NEGATIVE
BILIRUB UR QL STRIP: NEGATIVE
BLD GP AB SCN CELLS X3 SERPL QL: NORMAL
BUN SERPL-MCNC: 8 MG/DL (ref 6–20)
CALCIUM SERPL-MCNC: 9.2 MG/DL (ref 8.7–10.5)
CHLORIDE SERPL-SCNC: 107 MMOL/L (ref 95–110)
CLARITY UR REFRACT.AUTO: ABNORMAL
CLARITY UR: CLEAR
CO2 SERPL-SCNC: 19 MMOL/L (ref 23–29)
COLOR UR AUTO: YELLOW
COLOR UR: YELLOW
CREAT SERPL-MCNC: 0.6 MG/DL (ref 0.5–1.4)
DIFFERENTIAL METHOD BLD: ABNORMAL
EOSINOPHIL NFR BLD: 0 % (ref 0–8)
ERYTHROCYTE [DISTWIDTH] IN BLOOD BY AUTOMATED COUNT: 11.8 % (ref 11.5–14.5)
EST. GFR  (NO RACE VARIABLE): >60 ML/MIN/1.73 M^2
GLUCOSE SERPL-MCNC: 105 MG/DL (ref 70–110)
GLUCOSE UR QL STRIP: ABNORMAL
GLUCOSE UR QL STRIP: ABNORMAL
HCT VFR BLD AUTO: 28.8 % (ref 37–48.5)
HGB BLD-MCNC: 9.8 G/DL (ref 12–16)
HGB UR QL STRIP: NEGATIVE
HGB UR QL STRIP: NEGATIVE
HIV 1+2 AB+HIV1 P24 AG SERPL QL IA: NEGATIVE
HYALINE CASTS #/AREA URNS LPF: 0 /LPF
IMM GRANULOCYTES # BLD AUTO: ABNORMAL K/UL (ref 0–0.04)
IMM GRANULOCYTES NFR BLD AUTO: ABNORMAL % (ref 0–0.5)
KETONES UR QL STRIP: ABNORMAL
KETONES UR QL STRIP: ABNORMAL
LEUKOCYTE ESTERASE UR QL STRIP: ABNORMAL
LEUKOCYTE ESTERASE UR QL STRIP: ABNORMAL
LYMPHOCYTES NFR BLD: 13 % (ref 18–48)
MCH RBC QN AUTO: 31.5 PG (ref 27–31)
MCHC RBC AUTO-ENTMCNC: 34 G/DL (ref 32–36)
MCV RBC AUTO: 93 FL (ref 82–98)
MICROSCOPIC COMMENT: ABNORMAL
MICROSCOPIC COMMENT: ABNORMAL
MONOCYTES NFR BLD: 10 % (ref 4–15)
NEUTROPHILS NFR BLD: 77 % (ref 38–73)
NITRITE UR QL STRIP: POSITIVE
NITRITE UR QL STRIP: POSITIVE
NRBC BLD-RTO: 0 /100 WBC
PH UR STRIP: 7 [PH] (ref 5–8)
PH UR STRIP: 7 [PH] (ref 5–8)
PLATELET # BLD AUTO: 207 K/UL (ref 150–450)
PMV BLD AUTO: 10.7 FL (ref 9.2–12.9)
POTASSIUM SERPL-SCNC: 3.8 MMOL/L (ref 3.5–5.1)
PROT SERPL-MCNC: 6.4 G/DL (ref 6–8.4)
PROT UR QL STRIP: ABNORMAL
PROT UR QL STRIP: ABNORMAL
RBC # BLD AUTO: 3.11 M/UL (ref 4–5.4)
RBC #/AREA URNS HPF: 1 /HPF (ref 0–4)
SODIUM SERPL-SCNC: 137 MMOL/L (ref 136–145)
SP GR UR STRIP: 1.02 (ref 1–1.03)
SP GR UR STRIP: 1.03 (ref 1–1.03)
SPECIMEN OUTDATE: NORMAL
SQUAMOUS #/AREA URNS AUTO: 3 /HPF
SQUAMOUS #/AREA URNS HPF: 3 /HPF
URN SPEC COLLECT METH UR: ABNORMAL
URN SPEC COLLECT METH UR: ABNORMAL
UROBILINOGEN UR STRIP-ACNC: NEGATIVE EU/DL
WBC # BLD AUTO: 12.91 K/UL (ref 3.9–12.7)
WBC #/AREA URNS AUTO: 13 /HPF (ref 0–5)
WBC #/AREA URNS HPF: 8 /HPF (ref 0–5)

## 2024-07-02 PROCEDURE — 82731 ASSAY OF FETAL FIBRONECTIN: CPT | Performed by: ADVANCED PRACTICE MIDWIFE

## 2024-07-02 PROCEDURE — 87591 N.GONORRHOEAE DNA AMP PROB: CPT | Performed by: ADVANCED PRACTICE MIDWIFE

## 2024-07-02 PROCEDURE — 81001 URINALYSIS AUTO W/SCOPE: CPT | Performed by: ADVANCED PRACTICE MIDWIFE

## 2024-07-02 PROCEDURE — 85007 BL SMEAR W/DIFF WBC COUNT: CPT | Performed by: ADVANCED PRACTICE MIDWIFE

## 2024-07-02 PROCEDURE — 99213 OFFICE O/P EST LOW 20 MIN: CPT | Mod: PBBFAC,25,27,TH,PN | Performed by: ADVANCED PRACTICE MIDWIFE

## 2024-07-02 PROCEDURE — 59025 FETAL NON-STRESS TEST: CPT

## 2024-07-02 PROCEDURE — 25000003 PHARM REV CODE 250: Performed by: ADVANCED PRACTICE MIDWIFE

## 2024-07-02 PROCEDURE — 87081 CULTURE SCREEN ONLY: CPT | Performed by: ADVANCED PRACTICE MIDWIFE

## 2024-07-02 PROCEDURE — 80053 COMPREHEN METABOLIC PANEL: CPT | Performed by: ADVANCED PRACTICE MIDWIFE

## 2024-07-02 PROCEDURE — 87491 CHLMYD TRACH DNA AMP PROBE: CPT | Performed by: ADVANCED PRACTICE MIDWIFE

## 2024-07-02 PROCEDURE — 85027 COMPLETE CBC AUTOMATED: CPT | Performed by: ADVANCED PRACTICE MIDWIFE

## 2024-07-02 PROCEDURE — 86593 SYPHILIS TEST NON-TREP QUANT: CPT | Performed by: ADVANCED PRACTICE MIDWIFE

## 2024-07-02 PROCEDURE — 99999 PR PBB SHADOW E&M-EST. PATIENT-LVL III: CPT | Mod: PBBFAC,,, | Performed by: ADVANCED PRACTICE MIDWIFE

## 2024-07-02 PROCEDURE — 81000 URINALYSIS NONAUTO W/SCOPE: CPT | Performed by: ADVANCED PRACTICE MIDWIFE

## 2024-07-02 PROCEDURE — 87389 HIV-1 AG W/HIV-1&-2 AB AG IA: CPT | Performed by: ADVANCED PRACTICE MIDWIFE

## 2024-07-02 PROCEDURE — 86900 BLOOD TYPING SEROLOGIC ABO: CPT | Performed by: ADVANCED PRACTICE MIDWIFE

## 2024-07-02 PROCEDURE — 99211 OFF/OP EST MAY X REQ PHY/QHP: CPT

## 2024-07-02 PROCEDURE — 86850 RBC ANTIBODY SCREEN: CPT | Performed by: ADVANCED PRACTICE MIDWIFE

## 2024-07-02 PROCEDURE — 63600175 PHARM REV CODE 636 W HCPCS: Performed by: ADVANCED PRACTICE MIDWIFE

## 2024-07-02 RX ORDER — BETAMETHASONE SODIUM PHOSPHATE AND BETAMETHASONE ACETATE 3; 3 MG/ML; MG/ML
12 INJECTION, SUSPENSION INTRA-ARTICULAR; INTRALESIONAL; INTRAMUSCULAR; SOFT TISSUE
Status: COMPLETED | OUTPATIENT
Start: 2024-07-02 | End: 2024-07-03

## 2024-07-02 RX ORDER — SODIUM CHLORIDE, SODIUM LACTATE, POTASSIUM CHLORIDE, CALCIUM CHLORIDE 600; 310; 30; 20 MG/100ML; MG/100ML; MG/100ML; MG/100ML
INJECTION, SOLUTION INTRAVENOUS CONTINUOUS
Status: DISCONTINUED | OUTPATIENT
Start: 2024-07-02 | End: 2024-07-03 | Stop reason: HOSPADM

## 2024-07-02 RX ORDER — ACETAMINOPHEN 500 MG
500 TABLET ORAL EVERY 6 HOURS PRN
Status: DISCONTINUED | OUTPATIENT
Start: 2024-07-02 | End: 2024-07-02 | Stop reason: SDUPTHER

## 2024-07-02 RX ORDER — ONDANSETRON 8 MG/1
8 TABLET, ORALLY DISINTEGRATING ORAL EVERY 8 HOURS PRN
Status: DISCONTINUED | OUTPATIENT
Start: 2024-07-02 | End: 2024-07-03 | Stop reason: HOSPADM

## 2024-07-02 RX ORDER — ONDANSETRON 8 MG/1
8 TABLET, ORALLY DISINTEGRATING ORAL EVERY 8 HOURS PRN
Status: DISCONTINUED | OUTPATIENT
Start: 2024-07-02 | End: 2024-07-02 | Stop reason: SDUPTHER

## 2024-07-02 RX ORDER — ACETAMINOPHEN 500 MG
500 TABLET ORAL EVERY 6 HOURS PRN
Status: DISCONTINUED | OUTPATIENT
Start: 2024-07-02 | End: 2024-07-03 | Stop reason: HOSPADM

## 2024-07-02 RX ADMIN — SODIUM CHLORIDE, POTASSIUM CHLORIDE, SODIUM LACTATE AND CALCIUM CHLORIDE: 600; 310; 30; 20 INJECTION, SOLUTION INTRAVENOUS at 07:07

## 2024-07-02 RX ADMIN — BETAMETHASONE SODIUM PHOSPHATE AND BETAMETHASONE ACETATE 12 MG: 3; 3 INJECTION, SUSPENSION INTRA-ARTICULAR; INTRALESIONAL; INTRAMUSCULAR at 05:07

## 2024-07-02 RX ADMIN — ACETAMINOPHEN 500 MG: 500 TABLET ORAL at 05:07

## 2024-07-02 RX ADMIN — CEFTRIAXONE 1 G: 1 INJECTION, POWDER, FOR SOLUTION INTRAMUSCULAR; INTRAVENOUS at 09:07

## 2024-07-02 RX ADMIN — SODIUM CHLORIDE, POTASSIUM CHLORIDE, SODIUM LACTATE AND CALCIUM CHLORIDE 1000 ML: 600; 310; 30; 20 INJECTION, SOLUTION INTRAVENOUS at 05:07

## 2024-07-02 NOTE — PROGRESS NOTES
29 y.o. female  at 32w3d   Reports + FM, denies VB, LOF or CTX  Concerned with strong back pains and lower abdomen cramping, pelvic pressure.  TW lbs   Tdap done 24  Anemia Hg/Ht: 10.2/ 30.5 on 2024     Encounter for supervision of normal pregnancy in multigravida    Anemia in pregnancy, third trimester    History of  premature rupture of membranes (PPROM)  Intermittent Back pain with wrap around cramping.  Spec exam done- GBS, gc/cmz swaps taken, Fetal Fibronectin taken.  Cervical exam: 3/70/-3  Wet [place] unremarkable    Report given to L&D in anticipation of patient for rule out  labor.    Patient states understanding        BRIAN VASQUEZ     rolling walker

## 2024-07-02 NOTE — HOSPITAL COURSE
Cervix 3/thick/high. Discussed POC with Dr. Jones. Will keep observation status and start BMZ series. IV bolus then 125mL/hour. US ordered for growth/position. FFN negative. Continuous monitoring. If becomes more active labor will start MgSO4 for neuro protection and abx for GBS unknown.   Remainder of hospital course was unremarkable and pt recovered well.  Pt was discharged on HD#2 upon meeting all discharge criteria and completing BMZ series.  Pt was counseled on warning sign instructions prior to her discharge.

## 2024-07-02 NOTE — SUBJECTIVE & OBJECTIVE
Obstetric HPI:  Patient reports Intensity: moderate contractions, active fetal movement, No vaginal bleeding , No loss of fluid     This pregnancy has been complicated by   Smoker  History of PPROM  THC use  Bipolar  POTS  Anemia      OB History    Para Term  AB Living   3 2 1 1 0 2   SAB IAB Ectopic Multiple Live Births   0 0 0 0 2      # Outcome Date GA Lbr Naseem/2nd Weight Sex Type Anes PTL Lv   3 Current            2  17 32w1d  1.65 kg (3 lb 10.2 oz) F Vag-Spont EPI Y DAK[place]      Name: BOWEN, GIRL APRIL      Apgar1: 8  Apgar5: 9   1 Term 14 40w0d  3.43 kg (7 lb 9 oz) M Vag-Spont   DA[place]     Past Medical History:   Diagnosis Date    ADD (attention deficit disorder)     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 hyperactivity     Depression     OCD (obsessive compulsive disorder)     PTSD (post-traumatic stress disorder)     Sciatica     Scoliosis     Vaginal laceration 2014     Past Surgical History:   Procedure Laterality Date    Pressure equalizer tubes         PTA Medications   Medication Sig    cyclobenzaprine (FLEXERIL) 10 MG tablet Take 1 tablet (10 mg total) by mouth 3 (three) times daily as needed for Muscle spasms.    ferrous sulfate 324 mg (65 mg iron) TbEC Take 1 tablet (324 mg total) by mouth once daily.    mirtazapine (REMERON) 15 MG tablet Take 15 mg by mouth every evening.    ondansetron (ZOFRAN-ODT) 4 MG TbDL Take 1 tablet (4 mg total) by mouth 2 (two) times daily.    prenatal vit/iron fum/folic ac (RIGHT STEP PRENATAL VITAMINS ORAL) Take by mouth. Taking gummies    pyridoxine, vitamin B6, (VITAMIN B-6) 50 MG Tab Take 50 mg by mouth once daily.    VRAYLAR 1.5 mg Cap Take 1.5 mg by mouth.    VYVANSE 60 mg capsule Take 60 mg by mouth every morning. (Patient not taking: Reported on 2024)       Review of patient's allergies indicates:  No Known Allergies     Family History       Problem Relation (Age of Onset)    Diabetes Paternal Grandmother, Maternal Grandmother     Hypertension Paternal Grandmother, Maternal Grandmother          Tobacco Use    Smoking status: Every Day     Current packs/day: 1.00     Types: Cigarettes    Smokeless tobacco: Current    Tobacco comments:     20 cigarettes daily    Substance and Sexual Activity    Alcohol use: Not Currently    Drug use: No    Sexual activity: Yes     Partners: Male     Review of Systems   Respiratory:  Negative for shortness of breath.    Cardiovascular:  Negative for chest pain.   Gastrointestinal:  Positive for abdominal pain. Negative for nausea and vomiting.   Genitourinary:  Negative for vaginal bleeding and vaginal discharge.   Musculoskeletal:  Positive for back pain.   Neurological:  Negative for headaches.   All other systems reviewed and are negative.     Objective:     Vital Signs (Most Recent):    Vital Signs (24h Range):  BP: (110)/(60) 110/60        There is no height or weight on file to calculate BMI.    FHT: 130 Cat 1 (reassuring)  TOCO:  Q 5-7 minutes     Physical Exam:   Constitutional: She is oriented to person, place, and time. She appears well-developed and well-nourished.    HENT:   Head: Normocephalic.      Cardiovascular:  Normal rate and regular rhythm.             Pulmonary/Chest: Effort normal.        Abdominal: Soft.     Genitourinary:    Vagina and uterus normal.             Musculoskeletal: Normal range of motion and moves all extremeties.       Neurological: She is alert and oriented to person, place, and time. She has normal reflexes.    Skin: Skin is warm and dry.         Cervix:  Dilation:  3  Effacement:  50%  Station: -3  Presentation: Vertex     Significant Labs:  Lab Results   Component Value Date    GROUPTRH A POS 01/03/2024    HEPBSAG Non-reactive 01/03/2024    STREPBCULT No Group B Streptococcus isolated 08/23/2017       I have personallly reviewed all pertinent lab results from the last 24 hours.

## 2024-07-02 NOTE — PATIENT INSTRUCTIONS
Patient Education       Pregnancy - The Seventh Month   About this topic   It is important for you to learn how to take care of yourself to help you have a healthy baby and safe delivery. It is good to have health care throughout your pregnancy.  The seventh month of your pregnancy starts around week 29 and lasts through week 32. By knowing how far along you are, you can learn what is normal for this stage of your pregnancy and plan for what is next.  General   Your body   During the seventh month of your pregnancy, here are some things you can expect.  You may:  Have weight gain of about 15 to 20 pounds (6.8 to 9 kg) total in your first 7 months.  Have more trouble moving about and sleeping as the baby gets bigger  Have very vivid dreams  Notice more vaginal discharge  Notice a creamy, watery substance leaking from your nipples  Need a shot called Rh immune globulin if your blood type may be different from your baby's  Your baby's growth and development:  Your baby is gaining weight and adding layers of fat.  Your baby turns to be head down, into the position for delivery.  They are able to respond to loud noises and to dream.  Your baby moves at least 10 times in 2 hours. If your baby isn't moving this much, talk to your doctor right away.  Your baby is about 16 inches (42 cm) long and weighs about 4 pounds (1,800 gm). Your baby is about the size of squash.  Things to Think About   Avoid alcohol, drugs, tobacco products, and second hand smoke  Think about what you want your baby's birth to be like. Who do you want with you?  Find out about what lactation services are covered by your insurance.  Look into lactation consultants and breastfeeding classes.  Where do you want to deliver? Its time to make a plan for labor and delivery.  Plan on getting a car seat and other things for your baby.  Talk to your doctor if you plan to travel or get on a plane.  When do I need to call the doctor?   Contractions every 10  minutes or more often that do not go away with drinking water or position changes.  Low, dull back pain that does not go away  Feeling unusually dizzy or tired  Pressure in your pelvis that feels like your baby is pushing down  A gush or constant trickle of watery or bloody fluid leaking from your vagina  Cramps in your lower belly that come and go or are constant  Little to no movement felt by baby in 2 hours. Your baby should be moving at least 10 times every 2 hours.  Headache that does not go away; blurry vision; seeing spots or halos; increase in swelling in your hands, feet, or face; and pain under your ribs on the right side  Vaginal bleeding with or without pain  Fever of 100.4°F (38°C) or higher  After a car accident, fall, or any trauma to your belly  Having thoughts of harming yourself or others, or do not feel safe at home  Where can I learn more?   American Academy of [employer] Physicians  https://familydoctor.org/changes-in-your-body-during-pregnancy-second-trimester/   American Academy of Family Physicians  https://familydoctor.org/changes-in-your-body-during-pregnancy-third-trimester/   [employer]  http://kidshealth.org/en/parents/pregnancy-calendar-intro.html?WT.ac=p-apoorva   Office on Womens Health  https://www.womenshealth.gov/pregnancy/youre-pregnant-now-what/stages-pregnancy   Last Reviewed Date   2020-05-06  Consumer Information Use and Disclaimer   This information is not specific medical advice and does not replace information you receive from your health care provider. This is only a brief summary of general information. It does NOT include all information about conditions, illnesses, injuries, tests, procedures, treatments, therapies, discharge instructions or life-style choices that may apply to you. You must talk with your health care provider for complete information about your health and treatment options. This information should not be used to decide whether or not to accept your health  care providers advice, instructions or recommendations. Only your health care provider has the knowledge and training to provide advice that is right for you.  Copyright   Copyright © 2021 UpTo[employer], Inc. and its affiliates and/or licensors. All rights reserved.  Patient Education       Fetal Movement   About this topic   Feeling your baby move for the first time is a good sign that your baby is doing well. You may begin to feel these movements between the 18th and 25th weeks of your pregnancy. If this is your first time being pregnant, it may be closer to 25 weeks. Your baby has been moving around before this, but the kicks have not been strong enough for you to feel. During the first weeks of feeling movement, you may start to see a pattern during the day when your baby is most active. You can track your baby's kicks each day at home. This is also known as kick counting. It is a good way to check on your baby's movements and well being.  Most often, fetal kick counting is used in high-risk pregnancies. It may be useful for all pregnancies. Counting and writing down your baby's kicks, jabs, twists, flutters, rolls, turns, flips, and swishes may help find a problem that needs more evaluation. The American College of Obstetricians and Gynecologists, or ACOG, suggests that you record how much time it takes you to feel 10 of these movements. Ideally, you should be able to feel 10 movements within 2 hours. Many people will track these movements in much less time.  General   How to Track Your Baby's Kick Counts   Most often your doctor will want you to wait until the 28th to 30th weeks of your pregnancy to start kick counting. Here are some tips to help you get started.  Find the time of day when your baby is most active. For some people, this is right after eating. Others find their baby moving a lot after they have been exercising or more active. Some babies are more active in the evenings when your blood sugar starts  "to lower.  Try to count kicks at about the same time each day.  Before you start counting, have something to eat or drink. Also take a short walk or do some light activity.  Choose a quiet place where you can focus on your baby's movements. Also get in a comfortable position. Try and lie on one side or the other. You may need to change positions until you find one that works best for you and your baby.  Keep a notebook to track your baby's kicks. Your doctor may give you a chart to use or you can make your own. Write down the date, time you started counting, and the time of each "kick" during a 2-hour period until you have felt 10 kicks.  Once you have recorded 10 kicks within 2 hours you can stop counting.  If you are not able to record 10 movements over 2 hours you should get up and move around or eat something and try again.  If you are not able to record 10 movements over 2 hours the second time, call your doctor. They may want you to go to the hospital to get your baby checked.  When do I need to call the doctor?   You have felt less than 10 movements over a period of 2 hours.  It takes longer each day to record 10 movements.  There is a big change in the pattern of movements you are writing down.  You feel no movement for 2 hours even after eating a snack, light activity, and position changes.  Teach Back: Helping You Understand   The Teach Back Method helps you understand the information we are giving you. After you talk with the staff, tell them in your own words what you learned. This helps to make sure the staff has described each thing clearly. It also helps to explain things that may have been confusing. Before going home, make sure you can do these:  I can tell you about feeling my baby move.  I can tell you how I will track my babys kicks.  I can tell you what I will do if I feel less than 10 movements in 2 hours, it takes longer to feel my baby move 10 times, or there is a big change in how my baby " is moving.  Last Reviewed Date   2021-10-08  Consumer Information Use and Disclaimer   This information is not specific medical advice and does not replace information you receive from your health care provider. This is only a brief summary of general information. It does NOT include all information about conditions, illnesses, injuries, tests, procedures, treatments, therapies, discharge instructions or life-style choices that may apply to you. You must talk with your health care provider for complete information about your health and treatment options. This information should not be used to decide whether or not to accept your health care providers advice, instructions or recommendations. Only your health care provider has the knowledge and training to provide advice that is right for you.  Copyright   Copyright © 2021 [employer]To[employer], Inc. and its affiliates and/or licensors. All rights reserved.

## 2024-07-02 NOTE — H&P
[facility] - Labor & Delivery  Obstetrics  History & Physical    Patient Name: April Pearl Khoury  MRN: [id number]  Admission Date: 2024  Primary Care Provider: Alycia Packer FNP    Subjective:     Principal Problem:Threatened premature labor in third trimester    History of Present Illness:  29 y.o.  at 32w3d presents to L&D from the clinic for possible PTL. She states that she woke up this morning and was having strong contractions. She denies LOF or vaginal bleeding.     Obstetric HPI:  Patient reports Intensity: moderate contractions, active fetal movement, No vaginal bleeding , No loss of fluid     This pregnancy has been complicated by   Smoker  History of PPROM  THC use  Bipolar  POTS  Anemia      OB History    Para Term  AB Living   3 2 1 1 0 2   SAB IAB Ectopic Multiple Live Births   0 0 0 0 2      # Outcome Date GA Lbr Naseem/2nd Weight Sex Type Anes PTL Lv   3 Current            2  17 32w1d  1.65 kg (3 lb 10.2 oz) F Vag-Spont EPI Y DAKOTA      Name: BOWEN, GIRL APRIL      Apgar1: 8  Apgar5: 9   1 Term 14 40w0d  3.43 kg (7 lb 9 oz) M Vag-Spont   DAKOTA     Past Medical History:   Diagnosis Date    ADD (attention deficit disorder)     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 hyperactivity     Depression     OCD (obsessive compulsive disorder)     PTSD (post-traumatic stress disorder)     Sciatica     Scoliosis     Vaginal laceration 2014     Past Surgical History:   Procedure Laterality Date    Pressure equalizer tubes         PTA Medications   Medication Sig    cyclobenzaprine (FLEXERIL) 10 MG tablet Take 1 tablet (10 mg total) by mouth 3 (three) times daily as needed for Muscle spasms.    ferrous sulfate 324 mg (65 mg iron) TbEC Take 1 tablet (324 mg total) by mouth once daily.    mirtazapine (REMERON) 15 MG tablet Take 15 mg by mouth every evening.    ondansetron (ZOFRAN-ODT) 4 MG TbDL Take 1 tablet (4 mg total) by mouth 2 (two) times daily.    prenatal vit/iron  fum/folic ac (RIGHT STEP PRENATAL VITAMINS ORAL) Take by mouth. Taking gummies    pyridoxine, vitamin B6, (VITAMIN B-6) 50 MG Tab Take 50 mg by mouth once daily.    VRAYLAR 1.5 mg Cap Take 1.5 mg by mouth.    VYVANSE 60 mg capsule Take 60 mg by mouth every morning. (Patient not taking: Reported on 6/21/2024)       Review of patient's allergies indicates:  No Known Allergies     Family History       Problem Relation (Age of Onset)    Diabetes Paternal Grandmother, Maternal Grandmother    Hypertension Paternal Grandmother, Maternal Grandmother          Tobacco Use    Smoking status: Every Day     Current packs/day: 1.00     Types: Cigarettes    Smokeless tobacco: Current    Tobacco comments:     20 cigarettes daily    Substance and Sexual Activity    Alcohol use: Not Currently    Drug use: No    Sexual activity: Yes     Partners: Male     Review of Systems   Respiratory:  Negative for shortness of breath.    Cardiovascular:  Negative for chest pain.   Gastrointestinal:  Positive for abdominal pain. Negative for nausea and vomiting.   Genitourinary:  Negative for vaginal bleeding and vaginal discharge.   Musculoskeletal:  Positive for back pain.   Neurological:  Negative for headaches.   All other systems reviewed and are negative.     Objective:     Vital Signs (Most Recent):    Vital Signs (24h Range):  BP: (110)/(60) 110/60        There is no height or weight on file to calculate BMI.    FHT: 130 Cat 1 (reassuring)  TOCO:  Q 5-7 minutes     Physical Exam:   Constitutional: She is oriented to person, place, and time. She appears well-developed and well-nourished.    HENT:   Head: Normocephalic.      Cardiovascular:  Normal rate and regular rhythm.             Pulmonary/Chest: Effort normal.        Abdominal: Soft.     Genitourinary:    Vagina and uterus normal.             Musculoskeletal: Normal range of motion and moves all extremeties.       Neurological: She is alert and oriented to person, place, and time. She  has normal reflexes.    Skin: Skin is warm and dry.         Cervix:  Dilation:  3  Effacement:  50%  Station: -3  Presentation: Vertex     Significant Labs:  Lab Results   Component Value Date    GROUPTRH A POS 2024    HEPBSAG Non-reactive 2024    STREPBCULT No Group B Streptococcus isolated 2017       I have personallly reviewed all pertinent lab results from the last 24 hours.  Assessment/Plan:     29 y.o. female  at 32w3d for:    * Threatened premature labor in third trimester  Cervix 3/thick/high. Discussed POC with Dr. Jones. Will keep observation status and start BMZ series. IV bolus then 125mL/hour. US ordered for growth/position. FFN pending. Continuous monitoring. If becomes more active labor will start MgSO4 for neuro protection and abx for GBS unknown.     Anemia in pregnancy, third trimester  CBC on admit    Tetrahydrocannabinol (THC) use disorder, mild, abuse  JONAS Rosenberg CNM  Obstetrics  [facility] - [facility]

## 2024-07-02 NOTE — ASSESSMENT & PLAN NOTE
Cervix 3/thick/high. Discussed POC with Dr. Jones. Will keep observation status and start BMZ series. IV bolus then 125mL/hour. US ordered for growth/position. FFN pending. Continuous monitoring. If becomes more active labor will start MgSO4 for neuro protection and abx for GBS unknown.

## 2024-07-03 VITALS
SYSTOLIC BLOOD PRESSURE: 101 MMHG | TEMPERATURE: 98 F | DIASTOLIC BLOOD PRESSURE: 68 MMHG | OXYGEN SATURATION: 99 % | HEART RATE: 114 BPM | RESPIRATION RATE: 16 BRPM

## 2024-07-03 PROBLEM — O23.43 URINARY TRACT INFECTION IN MOTHER DURING THIRD TRIMESTER OF PREGNANCY: Status: ACTIVE | Noted: 2024-07-03

## 2024-07-03 LAB
C TRACH DNA SPEC QL NAA+PROBE: NOT DETECTED
FIBRONECTIN FETAL SPEC QL: NEGATIVE
N GONORRHOEA DNA SPEC QL NAA+PROBE: NOT DETECTED
TREPONEMA PALLIDUM IGG+IGM AB [PRESENCE] IN SERUM OR PLASMA BY IMMUNOASSAY: NONREACTIVE

## 2024-07-03 PROCEDURE — 25000003 PHARM REV CODE 250: Performed by: ADVANCED PRACTICE MIDWIFE

## 2024-07-03 PROCEDURE — 63600175 PHARM REV CODE 636 W HCPCS: Performed by: ADVANCED PRACTICE MIDWIFE

## 2024-07-03 PROCEDURE — 99232 SBSQ HOSP IP/OBS MODERATE 35: CPT | Mod: ,,, | Performed by: OBSTETRICS & GYNECOLOGY

## 2024-07-03 RX ORDER — CYCLOBENZAPRINE HCL 10 MG
10 TABLET ORAL ONCE
Status: COMPLETED | OUTPATIENT
Start: 2024-07-03 | End: 2024-07-03

## 2024-07-03 RX ORDER — NITROFURANTOIN 25; 75 MG/1; MG/1
100 CAPSULE ORAL 2 TIMES DAILY
Qty: 14 CAPSULE | Refills: 0 | Status: SHIPPED | OUTPATIENT
Start: 2024-07-03

## 2024-07-03 RX ORDER — ALUMINUM HYDROXIDE, MAGNESIUM HYDROXIDE, AND SIMETHICONE 1200; 120; 1200 MG/30ML; MG/30ML; MG/30ML
30 SUSPENSION ORAL EVERY 6 HOURS PRN
Status: DISCONTINUED | OUTPATIENT
Start: 2024-07-03 | End: 2024-07-03 | Stop reason: HOSPADM

## 2024-07-03 RX ADMIN — SODIUM CHLORIDE, POTASSIUM CHLORIDE, SODIUM LACTATE AND CALCIUM CHLORIDE: 600; 310; 30; 20 INJECTION, SOLUTION INTRAVENOUS at 03:07

## 2024-07-03 RX ADMIN — CYCLOBENZAPRINE HYDROCHLORIDE 10 MG: 10 TABLET, FILM COATED ORAL at 04:07

## 2024-07-03 RX ADMIN — BETAMETHASONE SODIUM PHOSPHATE AND BETAMETHASONE ACETATE 12 MG: 3; 3 INJECTION, SUSPENSION INTRA-ARTICULAR; INTRALESIONAL; INTRAMUSCULAR at 05:07

## 2024-07-03 NOTE — PROGRESS NOTES
[facility] - Labor & Delivery  Obstetrics  Antepartum Progress Note    Patient Name: Kristina Khoury  MRN: [id number]  Admission Date: 2024  Hospital Length of Stay: 0 days  Attending Physician: Linda Jones MD  Primary Care Provider: Alycia Packer FNP    Subjective:     Principal Problem:Threatened premature labor in third trimester    HPI:  29 y.o.  at 32w3d presents to L&D from the clinic for possible PTL. She states that she woke up this morning and was having strong contractions. She denies LOF or vaginal bleeding.     Hospital Course:  Cervix 3/thick/high. Discussed POC with Dr. Jones. Will keep observation status and start BMZ series. IV bolus then 125mL/hour. US ordered for growth/position. FFN pending. Continuous monitoring. If becomes more active labor will start MgSO4 for neuro protection and abx for GBS unknown.     Obstetric HPI:  Patient reports no contractions, active fetal movement, absent vaginal bleeding , absent loss of fluid      Objective:     Vital Signs (Most Recent):  Temp: 98.2 °F (36.8 °C) (24)  Pulse: 110 (24)  Resp: 16 (24)  BP: 112/60 (24 0600)  SpO2: 97 % (24) Vital Signs (24h Range):  Temp:  [98.1 °F (36.7 °C)-98.2 °F (36.8 °C)] 98.2 °F (36.8 °C)  Pulse:  [] 110  Resp:  [16] 16  SpO2:  [96 %-100 %] 97 %  BP: (102-121)/(48-61) 112/60        There is no height or weight on file to calculate BMI.    FHT: Cat 1 (reassuring)  TOCO:  Sporadic contractions      Intake/Output Summary (Last 24 hours) at 7/3/2024 1007  Last data filed at 7/3/2024 0255  Gross per 24 hour   Intake 2000 ml   Output --   Net 2000 ml         Significant Labs:  Recent Lab Results         24  1719   24  1618   24  1516   24  1515        Albumin 2.9                          ALT 10             Anion Gap 11             Appearance, UA   Clear     Hazy       AST 18             Bacteria, UA   Many     Occasional        Basophil % 0.0             Bilirubin (UA)   Negative     Negative       BILIRUBIN TOTAL 0.2  Comment: For infants and newborns, interpretation of results should be based  on gestational age, weight and in agreement with clinical  observations.    Premature Infant recommended reference ranges:  Up to 24 hours.............<8.0 mg/dL  Up to 48 hours............<12.0 mg/dL  3-5 days..................<15.0 mg/dL  6-29 days.................<15.0 mg/dL               BUN 8             Calcium 9.2             Chloride 107             CO2 19             Color, UA   Yellow     Yellow       Creatinine 0.6             Differential Method Manual             eGFR >60             Eos % 0.0             Fetal Fibronectin     Negative         Glucose 105             Glucose, UA   1+     1+       Gran % 77.0             Group & Rh A POS             Hematocrit 28.8             Hemoglobin 9.8             HIV 1/2 Ag/Ab Negative             Hyaline Casts, UA   0           Immature Grans (Abs) CANCELED  Comment: Mild elevation in immature granulocytes is non specific and   can be seen in a variety of conditions including stress response,   acute inflammation, trauma and pregnancy. Correlation with other   laboratory and clinical findings is essential.    Result canceled by the ancillary.               Immature Granulocytes CANCELED  Comment: Result canceled by the ancillary.             INDIRECT FIORDALIZA NEG             Ketones, UA   Trace     1+       Leukocyte Esterase, UA   1+     2+       Lymph % 13.0             MCH 31.5             MCHC 34.0             MCV 93             Microscopic Comment   SEE COMMENT  Comment: Other formed elements not mentioned in the report are not   present in the microscopic examination.        SEE COMMENT  Comment: Other formed elements not mentioned in the report are not   present in the microscopic examination.          Mono % 10.0             MPV 10.7             NITRITE UA   Positive     Positive        nRBC 0             Blood, UA   Negative     Negative       pH, UA   7.0     7.0       Platelet Count 207             Potassium 3.8             PROTEIN TOTAL 6.4             Protein, UA   1+  Comment: Recommend a 24 hour urine protein or a urine   protein/creatinine ratio if globulin induced proteinuria is  clinically suspected.       Trace  Comment: Recommend a 24 hour urine protein or a urine   protein/creatinine ratio if globulin induced proteinuria is  clinically suspected.         RBC 3.11             RBC, UA   1           RDW 11.8             Sodium 137             Spec Grav UA   1.030     1.020       Specimen Outdate 2024 23:59             Specimen UA   Urine, Clean Catch     Urine, Clean Catch       Squam Epithel, UA   3     3       Treponema Pallidum Antibodies (IgG, IgM) Nonreactive             UROBILINOGEN UA   Negative           WBC, UA   8     13       WBC 12.91                     Physical Exam:   Constitutional: She is oriented to person, place, and time. She appears well-developed and well-nourished. No distress.       Cardiovascular:  Normal rate, regular rhythm and normal heart sounds.             Pulmonary/Chest: Effort normal and breath sounds normal.        Abdominal: Soft. Bowel sounds are normal. She exhibits no distension. There is no abdominal tenderness.             Musculoskeletal: Normal range of motion and moves all extremeties. No tenderness or edema.       Neurological: She is alert and oriented to person, place, and time.    Skin: Skin is warm and dry.    Psychiatric: She has a normal mood and affect. Her behavior is normal. Thought content normal.       Review of Systems  Assessment/Plan:     29 y.o. female  at 32w4d for:    * Threatened premature labor in third trimester  HD # 2  Cervix 3/thick/high on admit and FFN negative.  Pt overall stable and fetal status reassuring.  Plan to complete BMZ series and observe.  Pt counseled on management plan and discharge  goals.    Anemia in pregnancy, third trimester  CBC on admit    Tetrahydrocannabinol (THC) use disorder, mild, abuse  UDS          Duane Montaño MD  Obstetrics  [facility] - [facility]

## 2024-07-03 NOTE — DISCHARGE SUMMARY
[facility] - Labor & Delivery  Obstetrics  Discharge Summary      Patient Name: Kristina Khouyr  MRN: [id number]  Admission Date: 2024  Hospital Length of Stay: 0 days  Discharge Date and Time:  2024 6:15 PM  Attending Physician: Linda Jones MD   Discharging Provider: Duane Montaño MD   Primary Care Provider: Alycia Packer FNP    HPI: 29 y.o.  at 32w3d presents to L&D from the clinic for possible PTL. She states that she woke up this morning and was having strong contractions. She denies LOF or vaginal bleeding.     FHT: Cat 1 (reassuring)  TOCO:  irritability    * No surgery found *     Hospital Course:   Cervix 3/thick/high. Discussed POC with Dr. Jones. Will keep observation status and start BMZ series. IV bolus then 125mL/hour. US ordered for growth/position. FFN negative. Continuous monitoring. If becomes more active labor will start MgSO4 for neuro protection and abx for GBS unknown.   Remainder of hospital course was unremarkable and pt recovered well.  Pt was discharged on HD#2 upon meeting all discharge criteria and completing BMZ series.  Pt was counseled on warning sign instructions prior to her discharge.         Final Active Diagnoses:    Diagnosis Date Noted POA    PRINCIPAL PROBLEM:  Threatened premature labor in third trimester [O47.03] 2024 Yes    Urinary tract infection in mother during third trimester of pregnancy [O23.43] 2024 Yes    Anemia in pregnancy, third trimester [O99.013] 2024 Yes    Tetrahydrocannabinol (THC) use disorder, mild, abuse [F12.10] 2024 Yes      Problems Resolved During this Admission:        Significant Diagnostic Studies: Labs: All labs within the past 24 hours have been reviewed      Feeding Method: N/A    Immunizations       None            This patient has no babies on file.  Pending Diagnostic Studies:       None            Discharged Condition: stable    Disposition: Home or Self Care    Follow Up:    Patient  Instructions:      Diet Adult Regular     Other restrictions (specify):   Order Comments: Light activity     Notify your health care provider if you experience any of the following:  increased confusion or weakness     Notify your health care provider if you experience any of the following:  persistent dizziness, light-headedness, or visual disturbances     Notify your health care provider if you experience any of the following:  worsening rash     Notify your health care provider if you experience any of the following:  severe persistent headache     Notify your health care provider if you experience any of the following:  difficulty breathing or increased cough     Notify your health care provider if you experience any of the following:  redness, tenderness, or signs of infection (pain, swelling, redness, odor or green/yellow discharge around incision site)     Notify your health care provider if you experience any of the following:  severe uncontrolled pain     Notify your health care provider if you experience any of the following:  persistent nausea and vomiting or diarrhea     Notify your health care provider if you experience any of the following:  temperature >100.4     Medications:  Current Discharge Medication List        START taking these medications    Details   nitrofurantoin, macrocrystal-monohydrate, (MACROBID) 100 MG capsule Take 1 capsule (100 mg total) by mouth 2 (two) times daily.  Qty: 14 capsule, Refills: 0    Associated Diagnoses: Urinary tract infection in mother during third trimester of pregnancy           CONTINUE these medications which have NOT CHANGED    Details   cyclobenzaprine (FLEXERIL) 10 MG tablet Take 1 tablet (10 mg total) by mouth 3 (three) times daily as needed for Muscle spasms.  Qty: 60 tablet, Refills: 0    Associated Diagnoses: Back pain during pregnancy in second trimester      ferrous sulfate 324 mg (65 mg iron) TbEC Take 1 tablet (324 mg total) by mouth once  daily.  Qty: 30 tablet, Refills: 5    Associated Diagnoses: Anemia in pregnancy, third trimester      mirtazapine (REMERON) 15 MG tablet Take 15 mg by mouth every evening.      ondansetron (ZOFRAN-ODT) 4 MG TbDL Take 1 tablet (4 mg total) by mouth 2 (two) times daily.  Qty: 30 tablet, Refills: 1    Associated Diagnoses: Nausea and vomiting in pregnancy      prenatal vit/iron fum/folic ac (RIGHT STEP PRENATAL VITAMINS ORAL) Take by mouth. Taking gummies      pyridoxine, vitamin B6, (VITAMIN B-6) 50 MG Tab Take 50 mg by mouth once daily.      VRAYLAR 1.5 mg Cap Take 1.5 mg by mouth.      VYVANSE 60 mg capsule Take 60 mg by mouth every morning.             Duane Montaño MD  Obstetrics  [facility] - [facility]

## 2024-07-03 NOTE — SUBJECTIVE & OBJECTIVE
Obstetric HPI:  Patient reports no contractions, active fetal movement, absent vaginal bleeding , absent loss of fluid      Objective:     Vital Signs (Most Recent):  Temp: 98.2 °F (36.8 °C) (07/03/24 0715)  Pulse: 110 (07/03/24 0715)  Resp: 16 (07/03/24 0715)  BP: 112/60 (07/03/24 0600)  SpO2: 97 % (07/03/24 0715) Vital Signs (24h Range):  Temp:  [98.1 °F (36.7 °C)-98.2 °F (36.8 °C)] 98.2 °F (36.8 °C)  Pulse:  [] 110  Resp:  [16] 16  SpO2:  [96 %-100 %] 97 %  BP: (102-121)/(48-61) 112/60        There is no height or weight on file to calculate BMI.    FHT: Cat 1 (reassuring)  TOCO:  Sporadic contractions      Intake/Output Summary (Last 24 hours) at 7/3/2024 1007  Last data filed at 7/3/2024 0255  Gross per 24 hour   Intake 2000 ml   Output --   Net 2000 ml         Significant Labs:  Recent Lab Results         07/02/24  1719   07/02/24  1618   07/02/24  1516   07/02/24  1515        Albumin 2.9                          ALT 10             Anion Gap 11             Appearance, UA   Clear     Hazy       AST 18             Bacteria, UA   Many     Occasional       Basophil % 0.0             Bilirubin (UA)   Negative     Negative       BILIRUBIN TOTAL 0.2  Comment: For infants and newborns, interpretation of results should be based  on gestational age, weight and in agreement with clinical  observations.    Premature Infant recommended reference ranges:  Up to 24 hours.............<8.0 mg/dL  Up to 48 hours............<12.0 mg/dL  3-5 days..................<15.0 mg/dL  6-29 days.................<15.0 mg/dL               BUN 8             Calcium 9.2             Chloride 107             CO2 19             Color, UA   Yellow     Yellow       Creatinine 0.6             Differential Method Manual             eGFR >60             Eos % 0.0             Fetal Fibronectin     Negative         Glucose 105             Glucose, UA   1+     1+       Gran % 77.0             Group & Rh A POS             Hematocrit 28.8              Hemoglobin 9.8             HIV 1/2 Ag/Ab Negative             Hyaline Casts, UA   0           Immature Grans (Abs) CANCELED  Comment: Mild elevation in immature granulocytes is non specific and   can be seen in a variety of conditions including stress response,   acute inflammation, trauma and pregnancy. Correlation with other   laboratory and clinical findings is essential.    Result canceled by the ancillary.               Immature Granulocytes CANCELED  Comment: Result canceled by the ancillary.             INDIRECT FIORDALIZA NEG             Ketones, UA   Trace     1+       Leukocyte Esterase, UA   1+     2+       Lymph % 13.0             MCH 31.5             MCHC 34.0             MCV 93             Microscopic Comment   SEE COMMENT  Comment: Other formed elements not mentioned in the report are not   present in the microscopic examination.        SEE COMMENT  Comment: Other formed elements not mentioned in the report are not   present in the microscopic examination.          Mono % 10.0             MPV 10.7             NITRITE UA   Positive     Positive       nRBC 0             Blood, UA   Negative     Negative       pH, UA   7.0     7.0       Platelet Count 207             Potassium 3.8             PROTEIN TOTAL 6.4             Protein, UA   1+  Comment: Recommend a 24 hour urine protein or a urine   protein/creatinine ratio if globulin induced proteinuria is  clinically suspected.       Trace  Comment: Recommend a 24 hour urine protein or a urine   protein/creatinine ratio if globulin induced proteinuria is  clinically suspected.         RBC 3.11             RBC, UA   1           RDW 11.8             Sodium 137             Spec Grav UA   1.030     1.020       Specimen Outdate 07/05/2024 23:59             Specimen UA   Urine, Clean Catch     Urine, Clean Catch       Squam Epithel, UA   3     3       Treponema Pallidum Antibodies (IgG, IgM) Nonreactive             UROBILINOGEN UA   Negative           WBC, UA    8     13       WBC 12.91                     Physical Exam:   Constitutional: She is oriented to person, place, and time. She appears well-developed and well-nourished. No distress.       Cardiovascular:  Normal rate, regular rhythm and normal heart sounds.             Pulmonary/Chest: Effort normal and breath sounds normal.        Abdominal: Soft. Bowel sounds are normal. She exhibits no distension. There is no abdominal tenderness.             Musculoskeletal: Normal range of motion and moves all extremeties. No tenderness or edema.       Neurological: She is alert and oriented to person, place, and time.    Skin: Skin is warm and dry.    Psychiatric: She has a normal mood and affect. Her behavior is normal. Thought content normal.       Review of Systems

## 2024-07-03 NOTE — ANESTHESIA PREPROCEDURE EVALUATION
07/03/2024 April Pearl Khoury is a 29 y.o., female.      Pre-op Assessment    I have reviewed the Patient Summary Reports.     I have reviewed the Nursing Notes. I have reviewed the NPO Status.   I have reviewed the Medications.     Review of Systems  Anesthesia Hx:             Denies Family Hx of Anesthesia complications.    Denies Personal Hx of Anesthesia complications.                    Social:  Smoker       Hematology/Oncology:    Oncology Normal    -- Anemia:                                  EENT/Dental:  EENT/Dental Normal           Cardiovascular:  Cardiovascular Normal                   POTS                         Pulmonary:  Pulmonary Normal                       Hepatic/GI:  Hepatic/GI Normal                 Musculoskeletal:     scoliosis       Spine Disorders:             Neurological:    Neuromuscular Disease,                                 Neuromuscular Disease   Endocrine:  Endocrine Normal            Dermatological:  Skin Normal    Psych:  Psychiatric History  depression                Physical Exam  General: Well nourished, Cooperative, Alert and Oriented    Airway:  Mallampati: I   Mouth Opening: Normal  TM Distance: Normal  Tongue: Normal  Neck ROM: Normal ROM    Dental:  Intact        Anesthesia Plan  Type of Anesthesia, risks & benefits discussed:    Anesthesia Type: Epidural  Informed Consent: Informed consent signed with the Patient and all parties understand the risks and agree with anesthesia plan.  All questions answered. Patient consented to blood products? Yes  ASA Score: 2    Ready For Surgery From Anesthesia Perspective.     .

## 2024-07-03 NOTE — PROGRESS NOTES
Pt completed BMZ series.  Is comfortable and has been able to rest throughout the day.  Denies contractions, ROM, or vaginal bleeding.  Maternal VSS    CVX: 30/thick/ballotable, cephalic per RN exam    EFM: Cat 1  TOCO: irritability    A/P:  Pt stable for discharge to home with light activity and labor precautions.    Macrobid x 7 days for UTI.

## 2024-07-03 NOTE — ASSESSMENT & PLAN NOTE
HD # 2  Cervix 3/thick/high on admit and FFN negative.  Pt overall stable and fetal status reassuring.  Plan to complete BMZ series and observe.  Pt counseled on management plan and discharge goals.

## 2024-07-03 NOTE — DISCHARGE INSTRUCTIONS

## 2024-07-05 ENCOUNTER — PROCEDURE VISIT (OUTPATIENT)
Dept: OBSTETRICS AND GYNECOLOGY | Facility: CLINIC | Age: 29
End: 2024-07-05
Payer: MEDICAID

## 2024-07-05 ENCOUNTER — ROUTINE PRENATAL (OUTPATIENT)
Dept: OBSTETRICS AND GYNECOLOGY | Facility: CLINIC | Age: 29
End: 2024-07-05
Payer: MEDICAID

## 2024-07-05 VITALS
DIASTOLIC BLOOD PRESSURE: 65 MMHG | BODY MASS INDEX: 31.63 KG/M2 | SYSTOLIC BLOOD PRESSURE: 107 MMHG | WEIGHT: 178.56 LBS

## 2024-07-05 DIAGNOSIS — Z87.59 HISTORY OF PRETERM PREMATURE RUPTURE OF MEMBRANES (PPROM): ICD-10-CM

## 2024-07-05 DIAGNOSIS — Z36.2 ENCOUNTER FOR FOLLOW-UP ULTRASOUND OF FETAL ANATOMY: ICD-10-CM

## 2024-07-05 DIAGNOSIS — O99.013 ANEMIA IN PREGNANCY, THIRD TRIMESTER: ICD-10-CM

## 2024-07-05 DIAGNOSIS — O23.43 URINARY TRACT INFECTION IN MOTHER DURING THIRD TRIMESTER OF PREGNANCY: Primary | ICD-10-CM

## 2024-07-05 DIAGNOSIS — O34.33 PREMATURE CERVICAL DILATION, THIRD TRIMESTER: ICD-10-CM

## 2024-07-05 LAB — BACTERIA SPEC AEROBE CULT: NORMAL

## 2024-07-05 PROCEDURE — 99213 OFFICE O/P EST LOW 20 MIN: CPT | Mod: PBBFAC,TH | Performed by: ADVANCED PRACTICE MIDWIFE

## 2024-07-05 PROCEDURE — 99999 PR PBB SHADOW E&M-EST. PATIENT-LVL III: CPT | Mod: PBBFAC,,, | Performed by: ADVANCED PRACTICE MIDWIFE

## 2024-07-05 PROCEDURE — 76816 OB US FOLLOW-UP PER FETUS: CPT | Mod: PBBFAC | Performed by: OBSTETRICS & GYNECOLOGY

## 2024-07-05 NOTE — PROGRESS NOTES
29 y.o. female  at 32w6d   Reports + FM, denies VB, LOF or CTX  Doing well without concerns   VE today: 2.5/70/-3    TW lbs   Reviewed 28wk lab results - WNL (A POS)  Anemia - taking daily iron;  H&H 9.9/28.8 vs  10.2/30.5    Fetal growth:  Cephalic presentatoin, posterior placenta, mvp 5.4cm, EFW 2393g (5lb 4oz) at 48%ile. Subop views of aortic arch view and ductal arch view.    Urinary tract infection in mother during third trimester of pregnancy  UTI diagnosed in hospital on . Patient to  macrobid prescription today.    Anemia in pregnancy, third trimester  Patient to continue taking iron daily.    Premature cervical dilation, third trimester  -     US OB/GYN Procedure ([facility])-Future; Future  Will schedule f/u growth scan at 36 weeks when she comes back for next visit. Patient was counseled today on pelvic rest until delivery, meaning nothing in the vagina.    History of PPROM  Patient was 3/thick/high when she presented to labor and delivery on  for abdominal pain r/t contractions. Patient received IV fluids. Betamethasone series was completed. FFN collected at the hospital was negative. Patient was discharged home in stable condition intact, without bleeding, and without regular contractions.    Reviewed warning signs, normal FKCs,  labor precautions and how/when to call.  RTC x 2 wks, call or present sooner prn.      Jemima Howard RN BSN  Student midwife

## 2024-07-18 ENCOUNTER — ROUTINE PRENATAL (OUTPATIENT)
Dept: OBSTETRICS AND GYNECOLOGY | Facility: CLINIC | Age: 29
End: 2024-07-18
Payer: MEDICAID

## 2024-07-18 VITALS
WEIGHT: 180.31 LBS | DIASTOLIC BLOOD PRESSURE: 64 MMHG | BODY MASS INDEX: 31.95 KG/M2 | SYSTOLIC BLOOD PRESSURE: 110 MMHG

## 2024-07-18 DIAGNOSIS — O34.33 PREMATURE CERVICAL DILATION, THIRD TRIMESTER: ICD-10-CM

## 2024-07-18 DIAGNOSIS — O23.43 URINARY TRACT INFECTION IN MOTHER DURING THIRD TRIMESTER OF PREGNANCY: Primary | ICD-10-CM

## 2024-07-18 DIAGNOSIS — Z3A.34 34 WEEKS GESTATION OF PREGNANCY: ICD-10-CM

## 2024-07-18 PROCEDURE — 99999 PR PBB SHADOW E&M-EST. PATIENT-LVL III: CPT | Mod: PBBFAC,,, | Performed by: ADVANCED PRACTICE MIDWIFE

## 2024-07-18 PROCEDURE — 99213 OFFICE O/P EST LOW 20 MIN: CPT | Mod: PBBFAC,TH | Performed by: ADVANCED PRACTICE MIDWIFE

## 2024-07-18 PROCEDURE — 99213 OFFICE O/P EST LOW 20 MIN: CPT | Mod: TH,S$PBB,UC, | Performed by: ADVANCED PRACTICE MIDWIFE

## 2024-07-18 NOTE — PROGRESS NOTES
29 y.o. female  at 34w5d  Reports + FM, denies VB, LOF or regular CTX  Doing well but very uncomfortable.  States ready for baby    TW lbs     Urinary tract infection in mother during third trimester of pregnancy  KRYSTINA with NV    Premature cervical dilation, third trimester  Cervix unchanged since hospital stay    34 weeks gestation of pregnancy      Reviewed warning signs, normal FKCs, PTL precautions and how/when to call.  RTC x 2 wk, call or present sooner prn.

## 2024-07-20 ENCOUNTER — PATIENT MESSAGE (OUTPATIENT)
Dept: OTHER | Facility: OTHER | Age: 29
End: 2024-07-20
Payer: MEDICAID

## 2024-07-22 ENCOUNTER — E-VISIT (OUTPATIENT)
Dept: OBSTETRICS AND GYNECOLOGY | Facility: CLINIC | Age: 29
End: 2024-07-22
Payer: MEDICAID

## 2024-07-22 ENCOUNTER — TELEPHONE (OUTPATIENT)
Dept: OBSTETRICS AND GYNECOLOGY | Facility: CLINIC | Age: 29
End: 2024-07-22

## 2024-07-22 DIAGNOSIS — O26.893 ABDOMINAL PAIN DURING PREGNANCY IN THIRD TRIMESTER: Primary | ICD-10-CM

## 2024-07-22 DIAGNOSIS — R10.9 ABDOMINAL PAIN DURING PREGNANCY IN THIRD TRIMESTER: Primary | ICD-10-CM

## 2024-07-22 PROCEDURE — 99499 UNLISTED E&M SERVICE: CPT | Mod: 95,,, | Performed by: ADVANCED PRACTICE MIDWIFE

## 2024-07-22 NOTE — TELEPHONE ENCOUNTER
Called patient and advised unless medically indicated,  or induction would not be done.  Advised patient to discuss with provider at her next appointment (scheduled ).  Patient verbalized understanding.

## 2024-07-22 NOTE — TELEPHONE ENCOUNTER
----- Message from Miladis Hsieh sent at 2024  1:37 PM CDT -----  Contact: patient  Kristina Khoury would like a call back at [phone], in regards to discussing scheduling an induction or .

## 2024-07-30 ENCOUNTER — NURSE TRIAGE (OUTPATIENT)
Dept: ADMINISTRATIVE | Facility: CLINIC | Age: 29
End: 2024-07-30
Payer: MEDICAID

## 2024-07-30 ENCOUNTER — HOSPITAL ENCOUNTER (OUTPATIENT)
Facility: HOSPITAL | Age: 29
Discharge: HOME | End: 2024-07-30
Attending: OBSTETRICS & GYNECOLOGY | Admitting: OBSTETRICS & GYNECOLOGY
Payer: MEDICAID

## 2024-07-30 VITALS
DIASTOLIC BLOOD PRESSURE: 65 MMHG | WEIGHT: 180 LBS | HEART RATE: 126 BPM | OXYGEN SATURATION: 96 % | TEMPERATURE: 99 F | HEIGHT: 63 IN | SYSTOLIC BLOOD PRESSURE: 132 MMHG | BODY MASS INDEX: 31.89 KG/M2

## 2024-07-30 DIAGNOSIS — J30.89 NON-SEASONAL ALLERGIC RHINITIS: Primary | ICD-10-CM

## 2024-07-30 PROBLEM — Z3A.34 34 WEEKS GESTATION OF PREGNANCY: Status: RESOLVED | Noted: 2024-07-18 | Resolved: 2024-07-30

## 2024-07-30 LAB
BACTERIA #/AREA URNS HPF: ABNORMAL /HPF
BILIRUB UR QL STRIP: NEGATIVE
CLARITY UR: ABNORMAL
COLOR UR: YELLOW
GLUCOSE UR QL STRIP: ABNORMAL
HGB UR QL STRIP: ABNORMAL
HYALINE CASTS #/AREA URNS LPF: 0 /LPF
KETONES UR QL STRIP: ABNORMAL
LEUKOCYTE ESTERASE UR QL STRIP: ABNORMAL
MICROSCOPIC COMMENT: ABNORMAL
NITRITE UR QL STRIP: NEGATIVE
PH UR STRIP: 6 [PH] (ref 5–8)
PROT UR QL STRIP: ABNORMAL
RBC #/AREA URNS HPF: 5 /HPF (ref 0–4)
SARS-COV-2 RDRP RESP QL NAA+PROBE: NEGATIVE
SP GR UR STRIP: >1.03 (ref 1–1.03)
SQUAMOUS #/AREA URNS HPF: 10 /HPF
URN SPEC COLLECT METH UR: ABNORMAL
UROBILINOGEN UR STRIP-ACNC: ABNORMAL EU/DL
WBC #/AREA URNS HPF: 59 /HPF (ref 0–5)

## 2024-07-30 PROCEDURE — 81000 URINALYSIS NONAUTO W/SCOPE: CPT | Performed by: OBSTETRICS & GYNECOLOGY

## 2024-07-30 PROCEDURE — 87086 URINE CULTURE/COLONY COUNT: CPT | Performed by: OBSTETRICS & GYNECOLOGY

## 2024-07-30 PROCEDURE — 59025 FETAL NON-STRESS TEST: CPT

## 2024-07-30 PROCEDURE — G0378 HOSPITAL OBSERVATION PER HR: HCPCS

## 2024-07-30 PROCEDURE — U0002 COVID-19 LAB TEST NON-CDC: HCPCS | Performed by: OBSTETRICS & GYNECOLOGY

## 2024-07-30 PROCEDURE — 99214 OFFICE O/P EST MOD 30 MIN: CPT | Mod: UC,25,TH, | Performed by: ADVANCED PRACTICE MIDWIFE

## 2024-07-30 PROCEDURE — 59025 FETAL NON-STRESS TEST: CPT | Mod: 26,,, | Performed by: ADVANCED PRACTICE MIDWIFE

## 2024-07-30 PROCEDURE — 99211 OFF/OP EST MAY X REQ PHY/QHP: CPT

## 2024-07-30 RX ORDER — ACETAMINOPHEN 500 MG
500 TABLET ORAL EVERY 6 HOURS PRN
Status: DISCONTINUED | OUTPATIENT
Start: 2024-07-30 | End: 2024-07-31 | Stop reason: HOSPADM

## 2024-07-30 RX ORDER — SODIUM CHLORIDE, SODIUM LACTATE, POTASSIUM CHLORIDE, CALCIUM CHLORIDE 600; 310; 30; 20 MG/100ML; MG/100ML; MG/100ML; MG/100ML
INJECTION, SOLUTION INTRAVENOUS CONTINUOUS
Status: DISCONTINUED | OUTPATIENT
Start: 2024-07-30 | End: 2024-07-31 | Stop reason: HOSPADM

## 2024-07-30 RX ORDER — ONDANSETRON 8 MG/1
8 TABLET, ORALLY DISINTEGRATING ORAL EVERY 8 HOURS PRN
Status: DISCONTINUED | OUTPATIENT
Start: 2024-07-30 | End: 2024-07-31 | Stop reason: HOSPADM

## 2024-07-30 NOTE — TELEPHONE ENCOUNTER
Pt reports she is 36 weeks pregnant and having cold symptoms of nasal stuffiness, sneezing, and sinus pressure since yesterday, wanting to know what she can safely take OTC, Pt advised home care per protocol and from [facility] Your Pregnancy A-Z Cold symptoms/safe OTC meds. Pt then mentioned she is having worsening bilateral leg swelling that is pitting and the Rt leg is worse than her left leg for a few days now, states elevating them has not helped and she is now experiencing a tingling in the leg at times. Pt advised to be seen in an L&D within 4 hours per the Pregnancy Leg Edema Protocol. Pt  encouraged to call back with any worsening symptoms or questions. She verbalized understanding.    Reason for Disposition   Neti Pot, questions about   [1] Thigh, calf, or ankle swelling AND [2] bilateral AND [3] 1 side is more swollen    Additional Information   Negative: SEVERE difficulty breathing (e.g., struggling for each breath, speaks in single words)   Negative: Sounds like a life-threatening emergency to the triager   Negative: Fever > 104 F (40 C)   Negative: Patient sounds very sick or weak to the triager   Negative: [1] Fever > 101 F (38.3 C) AND [2] age > 60 years   Negative: [1] Fever > 100 F (37.8 C) AND [2] bedridden (e.g., CVA, chronic illness, recovering from surgery)   Negative: [1] Fever > 100 F (37.8 C) AND [2] diabetes mellitus or weak immune system (e.g., HIV positive, cancer chemo, splenectomy, organ transplant, chronic steroids)   Negative: Fever present > 3 days (72 hours)   Negative: [1] Fever returns after gone for over 24 hours AND [2] symptoms worse or not improved   Negative: [1] Sinus pain (not just congestion) AND [2] fever   Negative: Earache   Negative: [1] SEVERE sore throat AND [2] present > 24 hours   Negative: [1] Sinus congestion (pressure, fullness) AND [2] present > 10 days   Negative: [1] Nasal discharge AND [2] present > 10 days   Negative: Sores with yellow scabs around the nasal  opening   Negative: [1] Using nasal washes and pain medicine > 24 hours AND [2] sinus pain (lower forehead, cheekbone, or eye) persists   Negative: SEVERE difficulty breathing (e.g., struggling for each breath, speaks in single words)   Negative: Sounds like a life-threatening emergency to the triager   Negative: SEVERE leg swelling (e.g., swelling extends above knee, entire leg is swollen)   Negative: Chest pain   Negative: [1] Difficulty breathing AND [2] new-onset or getting worse   Negative: [1] Pregnant 20 or more weeks AND [2] new blurred vision or vision changes   Negative: [1] Pregnant 20 or more weeks AND [2] severe headache AND [3] not relieved with acetaminophen (e.g., Tylenol)   Negative: [1] Pregnant 20 or more weeks AND [2] upper abdominal pain lasts > 1 hour   Negative: [1] Pregnant 23 or more weeks AND [2] baby is moving less today (e.g., kick count < 5 in 1 hour or < 10 in 2 hours)   Negative: [1] Red area or streak AND [2] fever   Negative: [1] Swelling is painful to touch AND [2] fever   Negative: [1] Cast on leg or ankle AND [2] now increased pain   Negative: Patient sounds very sick or weak to the triager   Negative: [1] Pregnant 20 or more weeks AND [2] swelling of face, arm or hands  (Exception: Slight puffiness of fingers.)   Negative: [1] Pregnant 20 or more weeks AND [2] Systolic BP >= 140 OR Diastolic BP >= 90   Negative: [1] Thigh or calf pain AND [2] only 1 side AND [3] present > 1 hour   Negative: [1] Thigh, calf, or ankle swelling AND [2] only 1 side    Protocols used: Common Cold-A-AH, Pregnancy - Leg Swelling and Edema-A-AH

## 2024-07-31 NOTE — DISCHARGE INSTRUCTIONS

## 2024-07-31 NOTE — H&P
[facility] - Labor & Delivery  Obstetrics  History & Physical    Patient Name: April Pearl Khoury  MRN: [id number]  Admission Date: 2024  Primary Care Provider: Alycia Packer FNP    Subjective:     Principal Problem:Non-seasonal allergic rhinitis    History of Present Illness:  Presents to L&D reporting running nose, itchy eyes, congestion, denies fever or chills   Also states she has hip pain and hurts when needs to go to the bathroom with hx of frequent UTIs    Obstetric HPI:  Patient reports None contractions, active fetal movement, No vaginal bleeding , No loss of fluid     This pregnancy has been complicated by       OB History    Para Term  AB Living   3 2 1 1 0 2   SAB IAB Ectopic Multiple Live Births   0 0 0 0 2      # Outcome Date GA Lbr Naseem/2nd Weight Sex Type Anes PTL Lv   3 Current            2  17 32w1d  1.65 kg (3 lb 10.2 oz) F Vag-Spont EPI Y DAKOTA      Name: BOWEN, GIRL APRIL      Apgar1: 8  Apgar5: 9   1 Term 14 40w0d  3.43 kg (7 lb 9 oz) M Vag-Spont   DAKOTA     Past Medical History:   Diagnosis Date    ADD (attention deficit disorder)     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 hyperactivity     Depression     OCD (obsessive compulsive disorder)     PTSD (post-traumatic stress disorder)     Sciatica     Scoliosis     Vaginal laceration 2014     Past Surgical History:   Procedure Laterality Date    Pressure equalizer tubes         PTA Medications   Medication Sig    cyclobenzaprine (FLEXERIL) 10 MG tablet Take 1 tablet (10 mg total) by mouth 3 (three) times daily as needed for Muscle spasms.    ferrous sulfate 324 mg (65 mg iron) TbEC Take 1 tablet (324 mg total) by mouth once daily.    mirtazapine (REMERON) 15 MG tablet Take 15 mg by mouth every evening.    nitrofurantoin, macrocrystal-monohydrate, (MACROBID) 100 MG capsule Take 1 capsule (100 mg total) by mouth 2 (two) times daily.    ondansetron (ZOFRAN-ODT) 4 MG TbDL Take 1 tablet (4 mg total) by mouth 2  (two) times daily.    prenatal vit/iron fum/folic ac (RIGHT STEP PRENATAL VITAMINS ORAL) Take by mouth. Taking gummies    pyridoxine, vitamin B6, (VITAMIN B-6) 50 MG Tab Take 50 mg by mouth once daily.    VRAYLAR 1.5 mg Cap Take 1.5 mg by mouth.    VYVANSE 60 mg capsule Take 60 mg by mouth every morning.       Review of patient's allergies indicates:  No Known Allergies     Family History       Problem Relation (Age of Onset)    Diabetes Paternal Grandmother, Maternal Grandmother    Hypertension Paternal Grandmother, Maternal Grandmother          Tobacco Use    Smoking status: Every Day     Current packs/day: 1.00     Types: Cigarettes    Smokeless tobacco: Current    Tobacco comments:     20 cigarettes daily    Substance and Sexual Activity    Alcohol use: Not Currently    Drug use: No    Sexual activity: Yes     Partners: Male     Review of Systems   Constitutional:  Negative for chills and fever.   HENT:  Positive for nasal congestion.    Respiratory:  Negative for cough and shortness of breath.    Cardiovascular:  Positive for leg swelling.   Gastrointestinal:  Negative for abdominal pain, nausea and vomiting.   Genitourinary:  Negative for vaginal bleeding and vaginal discharge.   Musculoskeletal:  Positive for joint swelling.   Neurological:  Negative for headaches.   All other systems reviewed and are negative.     Objective:     Vital Signs (Most Recent):  Temp: 98.5 °F (36.9 °C) (07/30/24 2023)  Pulse: (!) 126 (07/30/24 1959)  BP: 132/65 (07/30/24 1959)  SpO2: 96 % (07/30/24 1959) Vital Signs (24h Range):  Temp:  [98.5 °F (36.9 °C)] 98.5 °F (36.9 °C)  Pulse:  [126] 126  SpO2:  [96 %] 96 %  BP: (132)/(65) 132/65     Weight: 81.6 kg (180 lb)  Body mass index is 31.89 kg/m².    FHT: 145bpm with moderate variability and accelerations, no deceleratiosn, Cat 1 (reassuring)  TOCO:  Irregular     Physical Exam:   Constitutional: She is oriented to person, place, and time. She appears well-developed and  well-nourished.    HENT:   Head: Normocephalic.      Cardiovascular:  Normal rate.             Pulmonary/Chest: Effort normal.        Abdominal: Soft. There is no abdominal tenderness.     Genitourinary:    Vagina and uterus normal.             Musculoskeletal: Normal range of motion and moves all extremeties.       Neurological: She is alert and oriented to person, place, and time.    Skin: Skin is warm and dry.    Psychiatric: She has a normal mood and affect. Her behavior is normal. Judgment and thought content normal.        Cervix:  Dilation:  4  Effacement:  70  Station: -2  Presentation: Vertex     Significant Labs:  Lab Results   Component Value Date    GROUPTRH A POS 2024    HEPBSAG Non-reactive 2024    STREPBCULT No Group B Streptococcus isolated 2024       I have personallly reviewed all pertinent lab results from the last 24 hours.  Assessment/Plan:     29 y.o. female  at 36w3d for:    * Non-seasonal allergic rhinitis  Observation   UA, reflex to culture  Po hydration   NST  SVE  Covid negative        Marco Greenberg CNM  Obstetrics  [facility] - [facility]

## 2024-07-31 NOTE — SUBJECTIVE & OBJECTIVE
Obstetric HPI:  Patient reports None contractions, active fetal movement, No vaginal bleeding , No loss of fluid     This pregnancy has been complicated by       OB History    Para Term  AB Living   3 2 1 1 0 2   SAB IAB Ectopic Multiple Live Births   0 0 0 0 2      # Outcome Date GA Lbr Naseem/2nd Weight Sex Type Anes PTL Lv   3 Current            2  17 32w1d  1.65 kg (3 lb 10.2 oz) F Vag-Spont EPI Y DAKOTA      Name: BOWEN, GIRL APRIL      Apgar1: 8  Apgar5: 9   1 Term 14 40w0d  3.43 kg (7 lb 9 oz) M Vag-Spont   DAKOTA     Past Medical History:   Diagnosis Date    ADD (attention deficit disorder)     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 hyperactivity     Depression     OCD (obsessive compulsive disorder)     PTSD (post-traumatic stress disorder)     Sciatica     Scoliosis     Vaginal laceration 2014     Past Surgical History:   Procedure Laterality Date    Pressure equalizer tubes         PTA Medications   Medication Sig    cyclobenzaprine (FLEXERIL) 10 MG tablet Take 1 tablet (10 mg total) by mouth 3 (three) times daily as needed for Muscle spasms.    ferrous sulfate 324 mg (65 mg iron) TbEC Take 1 tablet (324 mg total) by mouth once daily.    mirtazapine (REMERON) 15 MG tablet Take 15 mg by mouth every evening.    nitrofurantoin, macrocrystal-monohydrate, (MACROBID) 100 MG capsule Take 1 capsule (100 mg total) by mouth 2 (two) times daily.    ondansetron (ZOFRAN-ODT) 4 MG TbDL Take 1 tablet (4 mg total) by mouth 2 (two) times daily.    prenatal vit/iron fum/folic ac (RIGHT STEP PRENATAL VITAMINS ORAL) Take by mouth. Taking gummies    pyridoxine, vitamin B6, (VITAMIN B-6) 50 MG Tab Take 50 mg by mouth once daily.    VRAYLAR 1.5 mg Cap Take 1.5 mg by mouth.    VYVANSE 60 mg capsule Take 60 mg by mouth every morning.       Review of patient's allergies indicates:  No Known Allergies     Family History       Problem Relation (Age of Onset)    Diabetes Paternal Grandmother, Maternal  Grandmother    Hypertension Paternal Grandmother, Maternal Grandmother          Tobacco Use    Smoking status: Every Day     Current packs/day: 1.00     Types: Cigarettes    Smokeless tobacco: Current    Tobacco comments:     20 cigarettes daily    Substance and Sexual Activity    Alcohol use: Not Currently    Drug use: No    Sexual activity: Yes     Partners: Male     Review of Systems   Constitutional:  Negative for chills and fever.   HENT:  Positive for nasal congestion.    Respiratory:  Negative for cough and shortness of breath.    Cardiovascular:  Positive for leg swelling.   Gastrointestinal:  Negative for abdominal pain, nausea and vomiting.   Genitourinary:  Negative for vaginal bleeding and vaginal discharge.   Musculoskeletal:  Positive for joint swelling.   Neurological:  Negative for headaches.   All other systems reviewed and are negative.     Objective:     Vital Signs (Most Recent):  Temp: 98.5 °F (36.9 °C) (07/30/24 2023)  Pulse: (!) 126 (07/30/24 1959)  BP: 132/65 (07/30/24 1959)  SpO2: 96 % (07/30/24 1959) Vital Signs (24h Range):  Temp:  [98.5 °F (36.9 °C)] 98.5 °F (36.9 °C)  Pulse:  [126] 126  SpO2:  [96 %] 96 %  BP: (132)/(65) 132/65     Weight: 81.6 kg (180 lb)  Body mass index is 31.89 kg/m².    FHT: 145bpm with moderate variability and accelerations, no deceleratiosn, Cat 1 (reassuring)  TOCO:  Irregular     Physical Exam:   Constitutional: She is oriented to person, place, and time. She appears well-developed and well-nourished.    HENT:   Head: Normocephalic.      Cardiovascular:  Normal rate.             Pulmonary/Chest: Effort normal.        Abdominal: Soft. There is no abdominal tenderness.     Genitourinary:    Vagina and uterus normal.             Musculoskeletal: Normal range of motion and moves all extremeties.       Neurological: She is alert and oriented to person, place, and time.    Skin: Skin is warm and dry.    Psychiatric: She has a normal mood and affect. Her behavior is  normal. Judgment and thought content normal.        Cervix:  Dilation:  4  Effacement:  70  Station: -2  Presentation: Vertex     Significant Labs:  Lab Results   Component Value Date    GROUPTRH A POS 07/02/2024    HEPBSAG Non-reactive 01/03/2024    STREPBCULT No Group B Streptococcus isolated 07/02/2024       I have personallly reviewed all pertinent lab results from the last 24 hours.

## 2024-07-31 NOTE — HPI
Presents to L&D reporting running nose, itchy eyes, congestion, denies fever or chills   Also states she has hip pain and hurts when needs to go to the bathroom with hx of frequent UTIs

## 2024-07-31 NOTE — HOSPITAL COURSE
Observation   UA, reflex to culture  Po hydration   RNST  SVE 4/70/-2, vertex  Discharge instructions reviewed including safe medications to take during pregnancy for sinus congestion, suggestions for KT tape, PTL precautions, stressed hydration, when to return to hospital

## 2024-07-31 NOTE — PROCEDURES
"Kristina Khoury is a 29 y.o. female patient.    Temp: 98.5 °F (36.9 °C) (07/30/24 2023)  Pulse: (!) 126 (07/30/24 1959)  BP: 132/65 (07/30/24 1959)  SpO2: 96 % (07/30/24 1959)  Weight: 81.6 kg (180 lb) (07/30/24 2023)  Height: 5' 3" (160 cm) (07/30/24 2023)       Obtain Fetal nonstress test (NST)    Date/Time: 7/30/2024 9:44 PM    Performed by: Marco Greenberg CNM  Authorized by: Marco Greenberg CNM    Nonstress Test:     Variability:  6-25 BPM    Decelerations:  None    Accelerations:  15 bpm    Acoustic Stimulator: No      Baseline:  145    Uterine Irritability: No      Contractions:  Irregular  Biophysical Profile:     Nonstress Test Interpretation: reactive      Overall Impression:  Reassuring  Post-procedure:     Patient tolerance:  Patient tolerated the procedure well with no immediate complications    7/30/2024    "

## 2024-07-31 NOTE — DISCHARGE SUMMARY
[facility] - [facility]  Obstetrics  Discharge Summary      Patient Name: Kristina Khoury  MRN: [id number]  Admission Date: 7/30/2024  Hospital Length of Stay: 0 days  Discharge Date and Time:  07/30/2024 9:48 PM  Attending Physician: Linda Jones MD   Discharging Provider: Marco Greenberg CNM   Primary Care Provider: Alycia Packer FNP    HPI: Presents to L&D reporting running nose, itchy eyes, congestion, denies fever or chills   Also states she has hip pain and hurts when needs to go to the bathroom with hx of frequent UTIs    FHT: 145bpm with moderate variability and accelerations, no decelerations, Cat 1 (reassuring)  TOCO:  Irregular    * No surgery found *     Hospital Course:   Observation   UA, reflex to culture  Po hydration   RNST  SVE 4/70/-2, vertex  Discharge instructions reviewed including safe medications to take during pregnancy for sinus congestion, suggestions for KT tape, PTL precautions, stressed hydration, when to return to hospital          Final Active Diagnoses:    Diagnosis Date Noted POA    PRINCIPAL PROBLEM:  Non-seasonal allergic rhinitis [J30.89] 07/30/2024 Yes      Problems Resolved During this Admission:        Significant Diagnostic Studies: Labs: All labs within the past 24 hours have been reviewed      Immunizations       None            This patient has no babies on file.  Pending Diagnostic Studies:       None            Discharged Condition: stable    Disposition: Home or Self Care    Follow Up:   Follow-up Information       Raysa Hernandez CNM Follow up on 8/2/2024.    Specialty: Obstetrics and Gynecology  Contact information:  [address] [place] [address] [address] [place] [address] [phone]                           Patient Instructions:      Notify your health care provider if you experience any of the following:  temperature >100.4     Notify your health care provider if you experience any of the following:  persistent nausea and vomiting or diarrhea     Notify  your health care provider if you experience any of the following:  severe uncontrolled pain     Notify your health care provider if you experience any of the following:  difficulty breathing or increased cough     Notify your health care provider if you experience any of the following:  severe persistent headache     Notify your health care provider if you experience any of the following:  persistent dizziness, light-headedness, or visual disturbances     Notify your health care provider if you experience any of the following:  increased confusion or weakness     Medications:  Current Discharge Medication List        CONTINUE these medications which have NOT CHANGED    Details   cyclobenzaprine (FLEXERIL) 10 MG tablet Take 1 tablet (10 mg total) by mouth 3 (three) times daily as needed for Muscle spasms.  Qty: 60 tablet, Refills: 0    Associated Diagnoses: Back pain during pregnancy in second trimester      ferrous sulfate 324 mg (65 mg iron) TbEC Take 1 tablet (324 mg total) by mouth once daily.  Qty: 30 tablet, Refills: 5    Associated Diagnoses: Anemia in pregnancy, third trimester      mirtazapine (REMERON) 15 MG tablet Take 15 mg by mouth every evening.      ondansetron (ZOFRAN-ODT) 4 MG TbDL Take 1 tablet (4 mg total) by mouth 2 (two) times daily.  Qty: 30 tablet, Refills: 1    Associated Diagnoses: Nausea and vomiting in pregnancy      prenatal vit/iron fum/folic ac (RIGHT STEP PRENATAL VITAMINS ORAL) Take by mouth. Taking gummies      pyridoxine, vitamin B6, (VITAMIN B-6) 50 MG Tab Take 50 mg by mouth once daily.           STOP taking these medications       nitrofurantoin, macrocrystal-monohydrate, (MACROBID) 100 MG capsule Comments:   Reason for Stopping:         VRAYLAR 1.5 mg Cap Comments:   Reason for Stopping:         VYVANSE 60 mg capsule Comments:   Reason for Stopping:               Marco Greenberg CNM  Obstetrics  [facility] - [facility]

## 2024-08-01 LAB
BACTERIA UR CULT: NORMAL
BACTERIA UR CULT: NORMAL

## 2024-08-02 ENCOUNTER — ROUTINE PRENATAL (OUTPATIENT)
Dept: OBSTETRICS AND GYNECOLOGY | Facility: CLINIC | Age: 29
End: 2024-08-02
Payer: MEDICAID

## 2024-08-02 VITALS
DIASTOLIC BLOOD PRESSURE: 70 MMHG | WEIGHT: 191.56 LBS | SYSTOLIC BLOOD PRESSURE: 120 MMHG | BODY MASS INDEX: 33.94 KG/M2

## 2024-08-02 DIAGNOSIS — Z34.90 ENCOUNTER FOR ELECTIVE INDUCTION OF LABOR: Primary | ICD-10-CM

## 2024-08-02 DIAGNOSIS — R30.9 PAIN WITH URINATION: ICD-10-CM

## 2024-08-02 PROCEDURE — 87653 STREP B DNA AMP PROBE: CPT | Performed by: ADVANCED PRACTICE MIDWIFE

## 2024-08-02 PROCEDURE — 87086 URINE CULTURE/COLONY COUNT: CPT | Performed by: ADVANCED PRACTICE MIDWIFE

## 2024-08-02 PROCEDURE — 99999 PR PBB SHADOW E&M-EST. PATIENT-LVL I: CPT | Mod: PBBFAC,,, | Performed by: ADVANCED PRACTICE MIDWIFE

## 2024-08-02 PROCEDURE — 99211 OFF/OP EST MAY X REQ PHY/QHP: CPT | Mod: PBBFAC,TH | Performed by: ADVANCED PRACTICE MIDWIFE

## 2024-08-02 NOTE — PROGRESS NOTES
29 y.o. female  at 36w6d  Reports + FM, denies VB, LOF or regular CTX  Reports increase swelling, back pain and pelvic pressure    TW lbs   GBS collected today (GBS negative at 32 weeks, due to  next week)   The skin of the suprapubic region was evaluated and appears intact.    VE per pt request 480/-2 very soft/vertex    FHR 170s via doppler; recheck FHT after a few minutes and baseline audible in 150s with accelerations into 160-170s. Patient currently drinking a large dr pepper and baby very active. Reassuring.    Encounter for elective induction of labor  -     IP OB Labor Induction; Future  Requesting eIOL at 39 weeks    Pain with urination  -     CULTURE, URINE    Reviewed warning signs, normal FKCs, labor precautions and how/when to call.  RTC x 1 wk, call or present sooner prn.

## 2024-08-03 ENCOUNTER — HOSPITAL ENCOUNTER (OUTPATIENT)
Facility: HOSPITAL | Age: 29
Discharge: HOME | End: 2024-08-03
Attending: OBSTETRICS & GYNECOLOGY | Admitting: OBSTETRICS & GYNECOLOGY
Payer: MEDICAID

## 2024-08-03 VITALS — DIASTOLIC BLOOD PRESSURE: 78 MMHG | HEART RATE: 114 BPM | SYSTOLIC BLOOD PRESSURE: 126 MMHG

## 2024-08-03 DIAGNOSIS — O36.8130: ICD-10-CM

## 2024-08-03 PROCEDURE — 59025 FETAL NON-STRESS TEST: CPT | Mod: 26,,, | Performed by: ADVANCED PRACTICE MIDWIFE

## 2024-08-03 PROCEDURE — 59025 FETAL NON-STRESS TEST: CPT

## 2024-08-03 PROCEDURE — 99214 OFFICE O/P EST MOD 30 MIN: CPT | Mod: 25,TH,UC, | Performed by: ADVANCED PRACTICE MIDWIFE

## 2024-08-03 PROCEDURE — 99211 OFF/OP EST MAY X REQ PHY/QHP: CPT

## 2024-08-03 RX ORDER — ONDANSETRON 8 MG/1
8 TABLET, ORALLY DISINTEGRATING ORAL EVERY 8 HOURS PRN
Status: DISCONTINUED | OUTPATIENT
Start: 2024-08-03 | End: 2024-08-03 | Stop reason: HOSPADM

## 2024-08-03 RX ORDER — ACETAMINOPHEN 500 MG
500 TABLET ORAL EVERY 6 HOURS PRN
Status: DISCONTINUED | OUTPATIENT
Start: 2024-08-03 | End: 2024-08-03 | Stop reason: HOSPADM

## 2024-08-09 ENCOUNTER — ROUTINE PRENATAL (OUTPATIENT)
Dept: OBSTETRICS AND GYNECOLOGY | Facility: CLINIC | Age: 29
End: 2024-08-09
Payer: MEDICAID

## 2024-08-09 VITALS
DIASTOLIC BLOOD PRESSURE: 62 MMHG | BODY MASS INDEX: 33.66 KG/M2 | SYSTOLIC BLOOD PRESSURE: 122 MMHG | WEIGHT: 190.06 LBS

## 2024-08-09 DIAGNOSIS — O99.013 ANEMIA IN PREGNANCY, THIRD TRIMESTER: ICD-10-CM

## 2024-08-09 DIAGNOSIS — Z34.90 ENCOUNTER FOR ELECTIVE INDUCTION OF LABOR: ICD-10-CM

## 2024-08-09 DIAGNOSIS — F31.9 BIPOLAR DEPRESSION: ICD-10-CM

## 2024-08-09 DIAGNOSIS — F12.10 TETRAHYDROCANNABINOL (THC) USE DISORDER, MILD, ABUSE: ICD-10-CM

## 2024-08-09 DIAGNOSIS — Z3A.37 37 WEEKS GESTATION OF PREGNANCY: Primary | ICD-10-CM

## 2024-08-09 PROBLEM — O36.8130 DECREASED FETAL MOVEMENT IN SINGLETON PREGNANCY IN THIRD TRIMESTER, ANTEPARTUM: Status: RESOLVED | Noted: 2024-08-03 | Resolved: 2024-08-09

## 2024-08-09 PROBLEM — O47.03 THREATENED PREMATURE LABOR IN THIRD TRIMESTER: Status: RESOLVED | Noted: 2024-07-02 | Resolved: 2024-08-09

## 2024-08-09 PROCEDURE — 99212 OFFICE O/P EST SF 10 MIN: CPT | Mod: PBBFAC,TH | Performed by: MIDWIFE

## 2024-08-09 PROCEDURE — 99999 PR PBB SHADOW E&M-EST. PATIENT-LVL II: CPT | Mod: PBBFAC,,, | Performed by: MIDWIFE

## 2024-08-09 RX ORDER — SIMETHICONE 80 MG
1 TABLET,CHEWABLE ORAL 4 TIMES DAILY PRN
OUTPATIENT
Start: 2024-08-09

## 2024-08-09 RX ORDER — OXYTOCIN-SODIUM CHLORIDE 0.9% IV SOLN 30 UNIT/500ML 30-0.9/5 UT/ML-%
0-32 SOLUTION INTRAVENOUS CONTINUOUS
OUTPATIENT
Start: 2024-08-09

## 2024-08-09 RX ORDER — OXYTOCIN-SODIUM CHLORIDE 0.9% IV SOLN 30 UNIT/500ML 30-0.9/5 UT/ML-%
10 SOLUTION INTRAVENOUS ONCE AS NEEDED
OUTPATIENT
Start: 2024-08-09 | End: 2036-01-06

## 2024-08-09 RX ORDER — MISOPROSTOL 200 UG/1
800 TABLET ORAL ONCE AS NEEDED
OUTPATIENT
Start: 2024-08-09 | End: 2036-01-06

## 2024-08-09 RX ORDER — CARBOPROST TROMETHAMINE 250 UG/ML
250 INJECTION, SOLUTION INTRAMUSCULAR
OUTPATIENT
Start: 2024-08-09

## 2024-08-09 RX ORDER — SODIUM CHLORIDE 9 MG/ML
INJECTION, SOLUTION INTRAVENOUS
OUTPATIENT
Start: 2024-08-09

## 2024-08-09 RX ORDER — MUPIROCIN 20 MG/G
OINTMENT TOPICAL
OUTPATIENT
Start: 2024-08-09

## 2024-08-09 RX ORDER — METHYLERGONOVINE MALEATE 0.2 MG/ML
200 INJECTION INTRAVENOUS ONCE AS NEEDED
OUTPATIENT
Start: 2024-08-09 | End: 2036-01-06

## 2024-08-09 RX ORDER — ONDANSETRON 8 MG/1
8 TABLET, ORALLY DISINTEGRATING ORAL EVERY 8 HOURS PRN
OUTPATIENT
Start: 2024-08-09

## 2024-08-09 RX ORDER — SODIUM CHLORIDE, SODIUM LACTATE, POTASSIUM CHLORIDE, CALCIUM CHLORIDE 600; 310; 30; 20 MG/100ML; MG/100ML; MG/100ML; MG/100ML
INJECTION, SOLUTION INTRAVENOUS CONTINUOUS
OUTPATIENT
Start: 2024-08-09

## 2024-08-09 RX ORDER — OXYTOCIN-SODIUM CHLORIDE 0.9% IV SOLN 30 UNIT/500ML 30-0.9/5 UT/ML-%
10 SOLUTION INTRAVENOUS ONCE
OUTPATIENT
Start: 2024-08-09 | End: 2024-08-09

## 2024-08-09 RX ORDER — MISOPROSTOL 200 UG/1
800 TABLET ORAL ONCE AS NEEDED
OUTPATIENT
Start: 2024-08-09

## 2024-08-09 RX ORDER — OXYTOCIN-SODIUM CHLORIDE 0.9% IV SOLN 30 UNIT/500ML 30-0.9/5 UT/ML-%
95 SOLUTION INTRAVENOUS ONCE
OUTPATIENT
Start: 2024-08-09 | End: 2024-08-09

## 2024-08-09 RX ORDER — OXYTOCIN-SODIUM CHLORIDE 0.9% IV SOLN 30 UNIT/500ML 30-0.9/5 UT/ML-%
95 SOLUTION INTRAVENOUS ONCE AS NEEDED
OUTPATIENT
Start: 2024-08-09 | End: 2036-01-06

## 2024-08-09 RX ORDER — LIDOCAINE HYDROCHLORIDE 10 MG/ML
10 INJECTION, SOLUTION INFILTRATION; PERINEURAL ONCE AS NEEDED
OUTPATIENT
Start: 2024-08-09 | End: 2036-01-06

## 2024-08-09 RX ORDER — OXYTOCIN 10 [USP'U]/ML
10 INJECTION, SOLUTION INTRAMUSCULAR; INTRAVENOUS ONCE AS NEEDED
OUTPATIENT
Start: 2024-08-09 | End: 2036-01-06

## 2024-08-09 RX ORDER — DIPHENOXYLATE HYDROCHLORIDE AND ATROPINE SULFATE 2.5; .025 MG/1; MG/1
2 TABLET ORAL EVERY 6 HOURS PRN
OUTPATIENT
Start: 2024-08-09

## 2024-08-09 RX ORDER — CALCIUM CARBONATE 200(500)MG
500 TABLET,CHEWABLE ORAL 3 TIMES DAILY PRN
OUTPATIENT
Start: 2024-08-09

## 2024-08-17 ENCOUNTER — ANESTHESIA (OUTPATIENT)
Dept: OBSTETRICS AND GYNECOLOGY | Facility: HOSPITAL | Age: 29
End: 2024-08-17
Payer: MEDICAID

## 2024-08-17 ENCOUNTER — ANESTHESIA EVENT (OUTPATIENT)
Dept: OBSTETRICS AND GYNECOLOGY | Facility: HOSPITAL | Age: 29
End: 2024-08-17
Payer: MEDICAID

## 2024-08-17 ENCOUNTER — HOSPITAL ENCOUNTER (INPATIENT)
Facility: HOSPITAL | Age: 29
LOS: 2 days | Discharge: HOME | End: 2024-08-19
Attending: OBSTETRICS & GYNECOLOGY | Admitting: OBSTETRICS & GYNECOLOGY
Payer: MEDICAID

## 2024-08-17 DIAGNOSIS — Z34.90 ENCOUNTER FOR ELECTIVE INDUCTION OF LABOR: ICD-10-CM

## 2024-08-17 PROBLEM — O34.33 PREMATURE CERVICAL DILATION, THIRD TRIMESTER: Status: RESOLVED | Noted: 2024-07-02 | Resolved: 2024-08-17

## 2024-08-17 PROBLEM — Z34.80 ENCOUNTER FOR SUPERVISION OF NORMAL PREGNANCY IN MULTIGRAVIDA: Status: RESOLVED | Noted: 2024-01-18 | Resolved: 2024-08-17

## 2024-08-17 PROBLEM — O23.43 URINARY TRACT INFECTION IN MOTHER DURING THIRD TRIMESTER OF PREGNANCY: Status: RESOLVED | Noted: 2024-07-03 | Resolved: 2024-08-17

## 2024-08-17 LAB
ABO + RH BLD: NORMAL
AMPHET+METHAMPHET UR QL: NEGATIVE
BARBITURATES UR QL SCN>200 NG/ML: NEGATIVE
BASOPHILS # BLD AUTO: 0.05 K/UL (ref 0–0.2)
BASOPHILS NFR BLD: 0.5 % (ref 0–1.9)
BENZODIAZ UR QL SCN>200 NG/ML: NEGATIVE
BLD GP AB SCN CELLS X3 SERPL QL: NORMAL
BZE UR QL SCN: NEGATIVE
CANNABINOIDS UR QL SCN: NEGATIVE
CREAT UR-MCNC: 132.7 MG/DL (ref 15–325)
DIFFERENTIAL METHOD BLD: ABNORMAL
EOSINOPHIL # BLD AUTO: 0.2 K/UL (ref 0–0.5)
EOSINOPHIL NFR BLD: 1.4 % (ref 0–8)
ERYTHROCYTE [DISTWIDTH] IN BLOOD BY AUTOMATED COUNT: 13 % (ref 11.5–14.5)
HCT VFR BLD AUTO: 30.8 % (ref 37–48.5)
HGB BLD-MCNC: 10.1 G/DL (ref 12–16)
HIV 1+2 AB+HIV1 P24 AG SERPL QL IA: NEGATIVE
IMM GRANULOCYTES # BLD AUTO: 0.21 K/UL (ref 0–0.04)
IMM GRANULOCYTES NFR BLD AUTO: 1.9 % (ref 0–0.5)
LYMPHOCYTES # BLD AUTO: 2.1 K/UL (ref 1–4.8)
LYMPHOCYTES NFR BLD: 18.9 % (ref 18–48)
MCH RBC QN AUTO: 29.3 PG (ref 27–31)
MCHC RBC AUTO-ENTMCNC: 32.8 G/DL (ref 32–36)
MCV RBC AUTO: 89 FL (ref 82–98)
METHADONE UR QL SCN>300 NG/ML: NEGATIVE
MONOCYTES # BLD AUTO: 0.8 K/UL (ref 0.3–1)
MONOCYTES NFR BLD: 7.1 % (ref 4–15)
NEUTROPHILS # BLD AUTO: 7.8 K/UL (ref 1.8–7.7)
NEUTROPHILS NFR BLD: 70.2 % (ref 38–73)
NRBC BLD-RTO: 0 /100 WBC
OPIATES UR QL SCN: NEGATIVE
PCP UR QL SCN>25 NG/ML: NEGATIVE
PLATELET # BLD AUTO: 237 K/UL (ref 150–450)
PMV BLD AUTO: 11.4 FL (ref 9.2–12.9)
RBC # BLD AUTO: 3.45 M/UL (ref 4–5.4)
TOXICOLOGY INFORMATION: NORMAL
TREPONEMA PALLIDUM IGG+IGM AB [PRESENCE] IN SERUM OR PLASMA BY IMMUNOASSAY: NONREACTIVE
WBC # BLD AUTO: 11.06 K/UL (ref 3.9–12.7)

## 2024-08-17 PROCEDURE — 27200710 HC EPIDURAL INFUSION PUMP SET: Performed by: ANESTHESIOLOGY

## 2024-08-17 PROCEDURE — 86901 BLOOD TYPING SEROLOGIC RH(D): CPT | Performed by: MIDWIFE

## 2024-08-17 PROCEDURE — 25000003 PHARM REV CODE 250: Performed by: NURSE ANESTHETIST, CERTIFIED REGISTERED

## 2024-08-17 PROCEDURE — 59025 FETAL NON-STRESS TEST: CPT

## 2024-08-17 PROCEDURE — 51702 INSERT TEMP BLADDER CATH: CPT

## 2024-08-17 PROCEDURE — 72100002 HC LABOR CARE, 1ST 8 HOURS

## 2024-08-17 PROCEDURE — 11000001 HC ACUTE MED/SURG PRIVATE ROOM

## 2024-08-17 PROCEDURE — 85025 COMPLETE CBC W/AUTO DIFF WBC: CPT | Performed by: MIDWIFE

## 2024-08-17 PROCEDURE — 25000003 PHARM REV CODE 250: Performed by: MIDWIFE

## 2024-08-17 PROCEDURE — 86593 SYPHILIS TEST NON-TREP QUANT: CPT | Performed by: MIDWIFE

## 2024-08-17 PROCEDURE — 87389 HIV-1 AG W/HIV-1&-2 AB AG IA: CPT | Performed by: MIDWIFE

## 2024-08-17 PROCEDURE — 3E033VJ INTRODUCTION OF OTHER HORMONE INTO PERIPHERAL VEIN, PERCUTANEOUS APPROACH: ICD-10-PCS | Performed by: OBSTETRICS & GYNECOLOGY

## 2024-08-17 PROCEDURE — 72200005 HC VAGINAL DELIVERY LEVEL II

## 2024-08-17 PROCEDURE — 63600175 PHARM REV CODE 636 W HCPCS: Performed by: MIDWIFE

## 2024-08-17 PROCEDURE — 0HQ9XZZ REPAIR PERINEUM SKIN, EXTERNAL APPROACH: ICD-10-PCS | Performed by: OBSTETRICS & GYNECOLOGY

## 2024-08-17 PROCEDURE — 27800516 HC TRAY, EPIDURAL COMBO: Performed by: ANESTHESIOLOGY

## 2024-08-17 PROCEDURE — 10907ZC DRAINAGE OF AMNIOTIC FLUID, THERAPEUTIC FROM PRODUCTS OF CONCEPTION, VIA NATURAL OR ARTIFICIAL OPENING: ICD-10-PCS | Performed by: OBSTETRICS & GYNECOLOGY

## 2024-08-17 PROCEDURE — 80307 DRUG TEST PRSMV CHEM ANLYZR: CPT | Performed by: MIDWIFE

## 2024-08-17 PROCEDURE — 86900 BLOOD TYPING SEROLOGIC ABO: CPT | Performed by: MIDWIFE

## 2024-08-17 PROCEDURE — 86850 RBC ANTIBODY SCREEN: CPT | Performed by: MIDWIFE

## 2024-08-17 PROCEDURE — 59409 OBSTETRICAL CARE: CPT | Mod: UC,GB,, | Performed by: MIDWIFE

## 2024-08-17 PROCEDURE — 63600175 PHARM REV CODE 636 W HCPCS: Performed by: NURSE ANESTHETIST, CERTIFIED REGISTERED

## 2024-08-17 PROCEDURE — 62326 NJX INTERLAMINAR LMBR/SAC: CPT | Performed by: NURSE ANESTHETIST, CERTIFIED REGISTERED

## 2024-08-17 RX ORDER — CARBOPROST TROMETHAMINE 250 UG/ML
250 INJECTION, SOLUTION INTRAMUSCULAR
Status: DISCONTINUED | OUTPATIENT
Start: 2024-08-17 | End: 2024-08-19 | Stop reason: HOSPADM

## 2024-08-17 RX ORDER — MISOPROSTOL 200 UG/1
800 TABLET ORAL ONCE AS NEEDED
Status: DISCONTINUED | OUTPATIENT
Start: 2024-08-17 | End: 2024-08-17

## 2024-08-17 RX ORDER — ONDANSETRON 8 MG/1
8 TABLET, ORALLY DISINTEGRATING ORAL EVERY 8 HOURS PRN
Status: DISCONTINUED | OUTPATIENT
Start: 2024-08-17 | End: 2024-08-17

## 2024-08-17 RX ORDER — MUPIROCIN 20 MG/G
OINTMENT TOPICAL
Status: DISCONTINUED | OUTPATIENT
Start: 2024-08-17 | End: 2024-08-17

## 2024-08-17 RX ORDER — METHYLERGONOVINE MALEATE 0.2 MG/ML
200 INJECTION INTRAVENOUS ONCE AS NEEDED
Status: DISCONTINUED | OUTPATIENT
Start: 2024-08-17 | End: 2024-08-19 | Stop reason: HOSPADM

## 2024-08-17 RX ORDER — OXYTOCIN-SODIUM CHLORIDE 0.9% IV SOLN 30 UNIT/500ML 30-0.9/5 UT/ML-%
0-32 SOLUTION INTRAVENOUS CONTINUOUS
Status: DISCONTINUED | OUTPATIENT
Start: 2024-08-17 | End: 2024-08-17

## 2024-08-17 RX ORDER — OXYTOCIN-SODIUM CHLORIDE 0.9% IV SOLN 30 UNIT/500ML 30-0.9/5 UT/ML-%
10 SOLUTION INTRAVENOUS ONCE AS NEEDED
Status: DISCONTINUED | OUTPATIENT
Start: 2024-08-17 | End: 2024-08-17

## 2024-08-17 RX ORDER — DIPHENOXYLATE HYDROCHLORIDE AND ATROPINE SULFATE 2.5; .025 MG/1; MG/1
2 TABLET ORAL EVERY 6 HOURS PRN
Status: DISCONTINUED | OUTPATIENT
Start: 2024-08-17 | End: 2024-08-19 | Stop reason: HOSPADM

## 2024-08-17 RX ORDER — ROPIVACAINE HYDROCHLORIDE 2 MG/ML
12 INJECTION, SOLUTION EPIDURAL; INFILTRATION CONTINUOUS
Status: DISCONTINUED | OUTPATIENT
Start: 2024-08-17 | End: 2024-08-17

## 2024-08-17 RX ORDER — DIPHENHYDRAMINE HYDROCHLORIDE 50 MG/ML
25 INJECTION INTRAMUSCULAR; INTRAVENOUS EVERY 4 HOURS PRN
Status: DISCONTINUED | OUTPATIENT
Start: 2024-08-17 | End: 2024-08-19 | Stop reason: HOSPADM

## 2024-08-17 RX ORDER — SODIUM CHLORIDE 9 MG/ML
INJECTION, SOLUTION INTRAVENOUS
Status: DISCONTINUED | OUTPATIENT
Start: 2024-08-17 | End: 2024-08-17

## 2024-08-17 RX ORDER — OXYTOCIN-SODIUM CHLORIDE 0.9% IV SOLN 30 UNIT/500ML 30-0.9/5 UT/ML-%
95 SOLUTION INTRAVENOUS ONCE AS NEEDED
Status: DISCONTINUED | OUTPATIENT
Start: 2024-08-17 | End: 2024-08-17

## 2024-08-17 RX ORDER — DIPHENHYDRAMINE HCL 25 MG
25 CAPSULE ORAL EVERY 4 HOURS PRN
Status: DISCONTINUED | OUTPATIENT
Start: 2024-08-17 | End: 2024-08-19 | Stop reason: HOSPADM

## 2024-08-17 RX ORDER — CALCIUM CARBONATE 200(500)MG
500 TABLET,CHEWABLE ORAL 3 TIMES DAILY PRN
Status: DISCONTINUED | OUTPATIENT
Start: 2024-08-17 | End: 2024-08-17

## 2024-08-17 RX ORDER — OXYTOCIN 10 [USP'U]/ML
10 INJECTION, SOLUTION INTRAMUSCULAR; INTRAVENOUS ONCE AS NEEDED
Status: DISCONTINUED | OUTPATIENT
Start: 2024-08-17 | End: 2024-08-17

## 2024-08-17 RX ORDER — CARBOPROST TROMETHAMINE 250 UG/ML
250 INJECTION, SOLUTION INTRAMUSCULAR
Status: DISCONTINUED | OUTPATIENT
Start: 2024-08-17 | End: 2024-08-17

## 2024-08-17 RX ORDER — TRANEXAMIC ACID 10 MG/ML
1000 INJECTION, SOLUTION INTRAVENOUS EVERY 30 MIN PRN
Status: DISCONTINUED | OUTPATIENT
Start: 2024-08-17 | End: 2024-08-17

## 2024-08-17 RX ORDER — ROPIVACAINE HYDROCHLORIDE 2 MG/ML
INJECTION, SOLUTION EPIDURAL; INFILTRATION
Status: DISCONTINUED | OUTPATIENT
Start: 2024-08-17 | End: 2024-08-17

## 2024-08-17 RX ORDER — FAMOTIDINE 10 MG/ML
20 INJECTION INTRAVENOUS ONCE
Status: DISCONTINUED | OUTPATIENT
Start: 2024-08-17 | End: 2024-08-17

## 2024-08-17 RX ORDER — ACETAMINOPHEN 325 MG/1
650 TABLET ORAL EVERY 6 HOURS PRN
Status: DISCONTINUED | OUTPATIENT
Start: 2024-08-17 | End: 2024-08-19 | Stop reason: HOSPADM

## 2024-08-17 RX ORDER — MISOPROSTOL 200 UG/1
800 TABLET ORAL ONCE AS NEEDED
Status: DISCONTINUED | OUTPATIENT
Start: 2024-08-17 | End: 2024-08-19 | Stop reason: HOSPADM

## 2024-08-17 RX ORDER — METHYLERGONOVINE MALEATE 0.2 MG/ML
200 INJECTION INTRAVENOUS ONCE AS NEEDED
Status: DISCONTINUED | OUTPATIENT
Start: 2024-08-17 | End: 2024-08-17

## 2024-08-17 RX ORDER — HYDROCORTISONE 25 MG/G
CREAM TOPICAL 3 TIMES DAILY PRN
Status: DISCONTINUED | OUTPATIENT
Start: 2024-08-17 | End: 2024-08-19 | Stop reason: HOSPADM

## 2024-08-17 RX ORDER — DIPHENOXYLATE HYDROCHLORIDE AND ATROPINE SULFATE 2.5; .025 MG/1; MG/1
2 TABLET ORAL EVERY 6 HOURS PRN
Status: DISCONTINUED | OUTPATIENT
Start: 2024-08-17 | End: 2024-08-17

## 2024-08-17 RX ORDER — OXYTOCIN-SODIUM CHLORIDE 0.9% IV SOLN 30 UNIT/500ML 30-0.9/5 UT/ML-%
95 SOLUTION INTRAVENOUS ONCE AS NEEDED
Status: DISCONTINUED | OUTPATIENT
Start: 2024-08-17 | End: 2024-08-19 | Stop reason: HOSPADM

## 2024-08-17 RX ORDER — SODIUM CHLORIDE 0.9 % (FLUSH) 0.9 %
10 SYRINGE (ML) INJECTION
Status: DISCONTINUED | OUTPATIENT
Start: 2024-08-17 | End: 2024-08-19 | Stop reason: HOSPADM

## 2024-08-17 RX ORDER — IBUPROFEN 600 MG/1
600 TABLET ORAL EVERY 6 HOURS
Status: DISCONTINUED | OUTPATIENT
Start: 2024-08-17 | End: 2024-08-19 | Stop reason: HOSPADM

## 2024-08-17 RX ORDER — OXYTOCIN-SODIUM CHLORIDE 0.9% IV SOLN 30 UNIT/500ML 30-0.9/5 UT/ML-%
10 SOLUTION INTRAVENOUS ONCE AS NEEDED
Status: DISCONTINUED | OUTPATIENT
Start: 2024-08-17 | End: 2024-08-19 | Stop reason: HOSPADM

## 2024-08-17 RX ORDER — LIDOCAINE HYDROCHLORIDE AND EPINEPHRINE 15; 5 MG/ML; UG/ML
INJECTION, SOLUTION EPIDURAL
Status: DISCONTINUED | OUTPATIENT
Start: 2024-08-17 | End: 2024-08-17

## 2024-08-17 RX ORDER — SODIUM CITRATE AND CITRIC ACID MONOHYDRATE 334; 500 MG/5ML; MG/5ML
30 SOLUTION ORAL ONCE
Status: DISCONTINUED | OUTPATIENT
Start: 2024-08-17 | End: 2024-08-17

## 2024-08-17 RX ORDER — ONDANSETRON 8 MG/1
8 TABLET, ORALLY DISINTEGRATING ORAL EVERY 8 HOURS PRN
Status: DISCONTINUED | OUTPATIENT
Start: 2024-08-17 | End: 2024-08-19 | Stop reason: HOSPADM

## 2024-08-17 RX ORDER — OXYTOCIN 10 [USP'U]/ML
10 INJECTION, SOLUTION INTRAMUSCULAR; INTRAVENOUS ONCE AS NEEDED
Status: DISCONTINUED | OUTPATIENT
Start: 2024-08-17 | End: 2024-08-19 | Stop reason: HOSPADM

## 2024-08-17 RX ORDER — LANOLIN ALCOHOL/MO/W.PET/CERES
1 CREAM (GRAM) TOPICAL DAILY
Status: DISCONTINUED | OUTPATIENT
Start: 2024-08-18 | End: 2024-08-19 | Stop reason: HOSPADM

## 2024-08-17 RX ORDER — SODIUM CHLORIDE, SODIUM LACTATE, POTASSIUM CHLORIDE, CALCIUM CHLORIDE 600; 310; 30; 20 MG/100ML; MG/100ML; MG/100ML; MG/100ML
INJECTION, SOLUTION INTRAVENOUS CONTINUOUS
Status: DISCONTINUED | OUTPATIENT
Start: 2024-08-17 | End: 2024-08-17

## 2024-08-17 RX ORDER — LIDOCAINE HYDROCHLORIDE 10 MG/ML
10 INJECTION, SOLUTION EPIDURAL; INFILTRATION; INTRACAUDAL; PERINEURAL ONCE AS NEEDED
Status: DISCONTINUED | OUTPATIENT
Start: 2024-08-17 | End: 2024-08-17

## 2024-08-17 RX ORDER — OXYTOCIN-SODIUM CHLORIDE 0.9% IV SOLN 30 UNIT/500ML 30-0.9/5 UT/ML-%
10 SOLUTION INTRAVENOUS ONCE
Status: DISCONTINUED | OUTPATIENT
Start: 2024-08-17 | End: 2024-08-17

## 2024-08-17 RX ORDER — TRANEXAMIC ACID 10 MG/ML
1000 INJECTION, SOLUTION INTRAVENOUS EVERY 30 MIN PRN
Status: DISCONTINUED | OUTPATIENT
Start: 2024-08-17 | End: 2024-08-19 | Stop reason: HOSPADM

## 2024-08-17 RX ORDER — OXYTOCIN-SODIUM CHLORIDE 0.9% IV SOLN 30 UNIT/500ML 30-0.9/5 UT/ML-%
95 SOLUTION INTRAVENOUS ONCE
Status: DISCONTINUED | OUTPATIENT
Start: 2024-08-17 | End: 2024-08-17

## 2024-08-17 RX ORDER — DOCUSATE SODIUM 100 MG/1
200 CAPSULE, LIQUID FILLED ORAL 2 TIMES DAILY PRN
Status: DISCONTINUED | OUTPATIENT
Start: 2024-08-17 | End: 2024-08-19 | Stop reason: HOSPADM

## 2024-08-17 RX ORDER — SIMETHICONE 80 MG
1 TABLET,CHEWABLE ORAL 4 TIMES DAILY PRN
Status: DISCONTINUED | OUTPATIENT
Start: 2024-08-17 | End: 2024-08-17

## 2024-08-17 RX ORDER — MIRTAZAPINE 15 MG/1
15 TABLET, FILM COATED ORAL NIGHTLY
Status: DISCONTINUED | OUTPATIENT
Start: 2024-08-17 | End: 2024-08-19 | Stop reason: HOSPADM

## 2024-08-17 RX ORDER — OXYTOCIN-SODIUM CHLORIDE 0.9% IV SOLN 30 UNIT/500ML 30-0.9/5 UT/ML-%
95 SOLUTION INTRAVENOUS ONCE
Status: DISCONTINUED | OUTPATIENT
Start: 2024-08-17 | End: 2024-08-19 | Stop reason: HOSPADM

## 2024-08-17 RX ORDER — HYDROCODONE BITARTRATE AND ACETAMINOPHEN 5; 325 MG/1; MG/1
1 TABLET ORAL EVERY 4 HOURS PRN
Status: DISCONTINUED | OUTPATIENT
Start: 2024-08-17 | End: 2024-08-19 | Stop reason: HOSPADM

## 2024-08-17 RX ADMIN — IBUPROFEN 600 MG: 600 TABLET ORAL at 06:08

## 2024-08-17 RX ADMIN — ROPIVACAINE HYDROCHLORIDE 12 ML/HR: 2 INJECTION, SOLUTION EPIDURAL; INFILTRATION at 11:08

## 2024-08-17 RX ADMIN — IBUPROFEN 600 MG: 600 TABLET ORAL at 11:08

## 2024-08-17 RX ADMIN — ROPIVACAINE HYDROCHLORIDE 6 ML: 2 INJECTION, SOLUTION EPIDURAL; INFILTRATION at 11:08

## 2024-08-17 RX ADMIN — SODIUM CHLORIDE, POTASSIUM CHLORIDE, SODIUM LACTATE AND CALCIUM CHLORIDE: 600; 310; 30; 20 INJECTION, SOLUTION INTRAVENOUS at 11:08

## 2024-08-17 RX ADMIN — Medication 4 MILLI-UNITS/MIN: at 12:08

## 2024-08-17 RX ADMIN — LIDOCAINE HYDROCHLORIDE,EPINEPHRINE BITARTRATE 3 ML: 15; .005 INJECTION, SOLUTION EPIDURAL; INFILTRATION; INTRACAUDAL; PERINEURAL at 11:08

## 2024-08-17 NOTE — LACTATION NOTE
This note was copied from a baby's chart.  Infant latched appropriately to breast for one hour. Still actively sucking. Mother requesting a formula bottle.  Reviewed risks of supplementation. Discussed adequacy of colostrum. Instructed mother on normal  feeding and sleeping patterns. Encouraged mother to breastfeed infant a minimum of 8 times in 24 hours prior to supplementation to promote appropriate breast stimulation for adequate milk supply.

## 2024-08-17 NOTE — ANESTHESIA PROCEDURE NOTES
Epidural    Patient location during procedure: OB   Reason for block: primary anesthetic   Reason for block: labor analgesia requested by patient and obstetrician  Diagnosis: IUP   Start time: 8/17/2024 11:10 AM  Timeout: 8/17/2024 11:10 AM  End time: 8/17/2024 11:22 AM  Surgery related to: Planned vaginal delivery    Staffing  Performing Provider: Mendoza Albarado CRNA  Authorizing Provider: Jeremy Samaniego MD    Staffing  Performed by: Mendoza Albarado CRNA  Authorized by: Jeremy Samaniego MD        Preanesthetic Checklist  Completed: patient identified, IV checked, site marked, risks and benefits discussed, surgical consent, monitors and equipment checked, pre-op evaluation, timeout performed, anesthesia consent given, hand hygiene performed and patient being monitored  Preparation  Patient position: sitting  Prep: ChloraPrep  Patient monitoring: Pulse Ox and Blood Pressure  Reason for block: primary anesthetic   Epidural  Skin Anesthetic: lidocaine 1%  Skin Wheal: 3 mL  Administration type: continuous  Approach: midline  Interspace: L3-4    Injection technique: CLARIBEL air  Needle and Epidural Catheter  Needle type: Tuohy   Needle gauge: 17  Needle length: 3.5 inches  Needle insertion depth: 7 cm  Catheter type: springwound and multi-orifice  Catheter size: 18 G  Catheter at skin depth: 14 cm  Insertion Attempts: 1  Test dose: 3 mL of lidocaine 1.5% with Epi 1-to-200,000  Additional Documentation: incremental injection, negative aspiration for heme and CSF, no paresthesia on injection, no signs/symptoms of IV or SA injection, no significant pain on injection and no significant complaints from patient  Needle localization: anatomical landmarks  Medications:  Volume per aspiration: 0 mL  Time between aspirations: 2 minutes   Assessment  Upper dermatomal levels - Left: T10  Right: T10   Dermatomal levels determined by pinch or prick  Ease of block: easy  Patient's tolerance of the procedure: comfortable  throughout block and no complaints No inadvertent dural puncture with Tuohy.  Dural puncture not performed with spinal needle

## 2024-08-17 NOTE — SUBJECTIVE & OBJECTIVE
Obstetric HPI:  Patient reports Intensity: moderate contractions, active fetal movement, No vaginal bleeding , No loss of fluid     This pregnancy has been complicated by:  Anemia  Bipolar depression  THC use  Back pain  Hx of PPROM  Insomnia  POTS  Premature cervical dilation  Smoker  UTI    OB History    Para Term  AB Living   3 2 1 1 0 2   SAB IAB Ectopic Multiple Live Births   0 0 0 0 2      # Outcome Date GA Lbr Naseem/2nd Weight Sex Type Anes PTL Lv   3 Current            2  17 32w1d  1.65 kg (3 lb 10.2 oz) F Vag-Spont EPI Y DAKOTA      Name: BOWEN, GIRL APRIL      Apgar1: 8  Apgar5: 9   1 Term 14 40w0d  3.43 kg (7 lb 9 oz) M Vag-Spont   DAKOTA     Past Medical History:   Diagnosis Date    ADD (attention deficit disorder)     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 hyperactivity     Depression     OCD (obsessive compulsive disorder)     PTSD (post-traumatic stress disorder)     Sciatica     Scoliosis     Vaginal laceration 2014     Past Surgical History:   Procedure Laterality Date    Pressure equalizer tubes         PTA Medications   Medication Sig    ferrous sulfate 324 mg (65 mg iron) TbEC Take 1 tablet (324 mg total) by mouth once daily.    mirtazapine (REMERON) 15 MG tablet Take 15 mg by mouth every evening.    prenatal vit/iron fum/folic ac (RIGHT STEP PRENATAL VITAMINS ORAL) Take by mouth. Taking gummies    pyridoxine, vitamin B6, (VITAMIN B-6) 50 MG Tab Take 50 mg by mouth once daily.       Review of patient's allergies indicates:  No Known Allergies     Family History       Problem Relation (Age of Onset)    Diabetes Paternal Grandmother, Maternal Grandmother    Hypertension Paternal Grandmother, Maternal Grandmother          Tobacco Use    Smoking status: Every Day     Current packs/day: 1.00     Types: Cigarettes    Smokeless tobacco: Current    Tobacco comments:     20 cigarettes daily    Substance and Sexual Activity    Alcohol use: Not Currently    Drug use: No     "Sexual activity: Yes     Partners: Male     Review of Systems   Gastrointestinal:  Positive for abdominal pain (moderate contractions).   Genitourinary:  Negative for vaginal bleeding and vaginal discharge.   All other systems reviewed and are negative.     Objective:     Vital Signs (Most Recent):    Vital Signs (24h Range):           There is no height or weight on file to calculate BMI.    FHT: Cat 1 (reassuring)  TOCO:  Irregular, palpate moderate     Physical Exam:   Constitutional: She is oriented to person, place, and time. She appears well-developed and well-nourished.    HENT:   Head: Normocephalic and atraumatic.    Eyes: Conjunctivae and EOM are normal.     Cardiovascular:  Normal rate.             Pulmonary/Chest: Effort normal. No respiratory distress.        Abdominal: Soft. She exhibits no distension. There is no abdominal tenderness.     Genitourinary:    Vagina and uterus normal.             Musculoskeletal: Normal range of motion and moves all extremeties.       Neurological: She is alert and oriented to person, place, and time.    Skin: Skin is warm and dry.    Psychiatric: She has a normal mood and affect. Her behavior is normal. Judgment and thought content normal.        Cervix:  Dilation:  6  Effacement:  80%  Station: -1  Presentation: Vertex    AROM per pt request. AROM moderate amount of blood-tinged fluid, tolerated well.      Significant Labs:  Lab Results   Component Value Date    GROUPTRH A POS 07/02/2024    HEPBSAG Non-reactive 01/03/2024    STREPBCULT No Group B Streptococcus isolated 07/02/2024       CBC:   Recent Labs   Lab 08/17/24  1042   WBC 11.06   RBC 3.45*   HGB 10.1*   HCT 30.8*      MCV 89   MCH 29.3   MCHC 32.8     CMP: No results for input(s): "GLU", "CALCIUM", "ALBUMIN", "PROT", "NA", "K", "CO2", "CL", "BUN", "CREATININE", "ALKPHOS", "ALT", "AST", "BILITOT" in the last 48 hours.  "

## 2024-08-17 NOTE — HOSPITAL COURSE
AROM, epidural, Pitocin    8/18/24: PPD1, Routine PP Care  8/19/24-PPD2, discharge instructions reviewed

## 2024-08-17 NOTE — ANESTHESIA PREPROCEDURE EVALUATION
2024  Kristina Khoury is a 29 y.o., female.      Pre-op Assessment    I have reviewed the Patient Summary Reports.     I have reviewed the Nursing Notes.    I have reviewed the Medications.     Review of Systems  Anesthesia Hx:   Neg history of prior surgery.          Denies Family Hx of Anesthesia complications.    Denies Personal Hx of Anesthesia complications.                    Musculoskeletal:     Scoliosis              OB/GYN/PEDS:    Planned Vaginal Delivery                     Neurological:    Neuromuscular Disease,                                 Neuromuscular Disease   Psych:  Psychiatric History                  Physical Exam  General: Well nourished, Cooperative and Alert    Airway:  Mallampati: II   Mouth Opening: Normal  TM Distance: Normal  Tongue: Normal  Neck ROM: Normal ROM    Dental:  Intact    Chest/Lungs:  Clear to auscultation, Normal Respiratory Rate    Heart:  Rate: Normal  Rhythm: Regular Rhythm  Sounds: Normal      Lab Results   Component Value Date    WBC 12.91 (H) 2024    HGB 9.8 (L) 2024    HCT 28.8 (L) 2024    MCV 93 2024     2024       Patient Active Problem List   Diagnosis    Smoker    History of  premature rupture of membranes (PPROM)    Tetrahydrocannabinol (THC) use disorder, mild, abuse    Other insomnia    Encounter for supervision of normal pregnancy in multigravida    Bipolar depression    POTS (postural orthostatic tachycardia syndrome)    Anemia in pregnancy, third trimester    Decreased ROM of lumbar spine    General weakness    Chronic midline low back pain    Premature cervical dilation, third trimester    Urinary tract infection in mother during third trimester of pregnancy    Non-seasonal allergic rhinitis    Encounter for elective induction of labor     OB History          3    Para   2    Term   1        1    AB        Living   2         SAB        IAB        Ectopic        Multiple   0    Live Births   2                   Anesthesia Plan  Type of Anesthesia, risks & benefits discussed:    Anesthesia Type: Epidural  Intra-op Monitoring Plan: Standard ASA Monitors  Post Op Pain Control Plan: epidural analgesia  Informed Consent: Informed consent signed with the Patient and all parties understand the risks and agree with anesthesia plan.  All questions answered.   ASA Score: 2  Day of Surgery Review of History & Physical: H&P Update referred to the surgeon/provider.    Ready For Surgery From Anesthesia Perspective.     .

## 2024-08-17 NOTE — H&P
[facility]  Obstetrics  History & Physical    Patient Name: April Pearl Khoury  MRN: [id number]  Admission Date: 2024  Primary Care Provider: Alycia Packer FNP    Subjective:     Principal Problem:Encounter for elective induction of labor    History of Present Illness:   39w here for EIOL    Obstetric HPI:  Patient reports Intensity: moderate contractions, active fetal movement, No vaginal bleeding , No loss of fluid     This pregnancy has been complicated by:  Anemia  Bipolar depression  THC use  Back pain  Hx of PPROM  Insomnia  POTS  Premature cervical dilation  Smoker  UTI    OB History    Para Term  AB Living   3 2 1 1 0 2   SAB IAB Ectopic Multiple Live Births   0 0 0 0 2      # Outcome Date GA Lbr Naseem/2nd Weight Sex Type Anes PTL Lv   3 Current            2  17 32w1d  1.65 kg (3 lb 10.2 oz) F Vag-Spont EPI Y DAKOTA      Name: BOWEN, GIRL APRIL      Apgar1: 8  Apgar5: 9   1 Term 14 40w0d  3.43 kg (7 lb 9 oz) M Vag-Spont   DAKOTA     Past Medical History:   Diagnosis Date    ADD (attention deficit disorder)     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 hyperactivity     Depression     OCD (obsessive compulsive disorder)     PTSD (post-traumatic stress disorder)     Sciatica     Scoliosis     Vaginal laceration 2014     Past Surgical History:   Procedure Laterality Date    Pressure equalizer tubes         PTA Medications   Medication Sig    ferrous sulfate 324 mg (65 mg iron) TbEC Take 1 tablet (324 mg total) by mouth once daily.    mirtazapine (REMERON) 15 MG tablet Take 15 mg by mouth every evening.    prenatal vit/iron fum/folic ac (RIGHT STEP PRENATAL VITAMINS ORAL) Take by mouth. Taking gummies    pyridoxine, vitamin B6, (VITAMIN B-6) 50 MG Tab Take 50 mg by mouth once daily.       Review of patient's allergies indicates:  No Known Allergies     Family History       Problem Relation (Age of Onset)    Diabetes Paternal Grandmother, Maternal  Grandmother    Hypertension Paternal Grandmother, Maternal Grandmother          Tobacco Use    Smoking status: Every Day     Current packs/day: 1.00     Types: Cigarettes    Smokeless tobacco: Current    Tobacco comments:     20 cigarettes daily    Substance and Sexual Activity    Alcohol use: Not Currently    Drug use: No    Sexual activity: Yes     Partners: Male     Review of Systems   Gastrointestinal:  Positive for abdominal pain (moderate contractions).   Genitourinary:  Negative for vaginal bleeding and vaginal discharge.   All other systems reviewed and are negative.     Objective:     Vital Signs (Most Recent):    Vital Signs (24h Range):           There is no height or weight on file to calculate BMI.    FHT: Cat 1 (reassuring)  TOCO:  Irregular, palpate moderate     Physical Exam:   Constitutional: She is oriented to person, place, and time. She appears well-developed and well-nourished.    HENT:   Head: Normocephalic and atraumatic.    Eyes: Conjunctivae and EOM are normal.     Cardiovascular:  Normal rate.             Pulmonary/Chest: Effort normal. No respiratory distress.        Abdominal: Soft. She exhibits no distension. There is no abdominal tenderness.     Genitourinary:    Vagina and uterus normal.             Musculoskeletal: Normal range of motion and moves all extremeties.       Neurological: She is alert and oriented to person, place, and time.    Skin: Skin is warm and dry.    Psychiatric: She has a normal mood and affect. Her behavior is normal. Judgment and thought content normal.        Cervix:  Dilation:  6  Effacement:  80%  Station: -1  Presentation: Vertex    AROM per pt request. AROM moderate amount of blood-tinged fluid, tolerated well.      Significant Labs:  Lab Results   Component Value Date    GROUPTRH A POS 07/02/2024    HEPBSAG Non-reactive 01/03/2024    STREPBCULT No Group B Streptococcus isolated 07/02/2024       CBC:   Recent Labs   Lab 08/17/24  1042   WBC 11.06   RBC  "3.45*   HGB 10.1*   HCT 30.8*      MCV 89   MCH 29.3   MCHC 32.8     CMP: No results for input(s): "GLU", "CALCIUM", "ALBUMIN", "PROT", "NA", "K", "CO2", "CL", "BUN", "CREATININE", "ALKPHOS", "ALT", "AST", "BILITOT" in the last 48 hours.  Assessment/Plan:     29 y.o. female  at 39w0d for:    * Encounter for elective induction of labor  AROM at 1052  Bolus for epidural  Start Pitocin once comfortable  Baby 48%tile, on . EFW 7.5lb by Leopold's.  Monitor maternal/fetal status closely  Anticipate progress and vaginal birth    Anemia in pregnancy, third trimester  H&H 10.1/30.8 on admit  Continue PO iron PP    Bipolar depression  Remeron, monitor PP    Tetrahydrocannabinol (THC) use disorder, mild, abuse  Uds on admit        Stacey Hernández CNM  Obstetrics  [facility]        "

## 2024-08-17 NOTE — L&D DELIVERY NOTE
"[facility] - [facility] Delivery  Vaginal Delivery   Operative Note    SUMMARY     Normal spontaneous vaginal delivery of live infant "Anum Baxter", was placed on mothers abdomen for skin to skin and bulb suctioning performed. Baby brought to warmer briefly for deep suctioning after delayed cord clamping observed.   Infant delivered position SUNG over perineum.  Loose nuchal cord: Yes, cord reduced at perineum.    Spontaneous delivery of placenta and IV pitocin given noting good uterine tone. Trailing membranes teased out with ring forceps. Sweep X2 in sterile fashion done and small piece of membrane removed from uterus. Uterine cavity free from debris after sweep.   1st degree laceration noted & repaired with 2.0 Chromic SH suture. Hemostasis and approximation obtained.  Patient tolerated delivery well. Sponge needle and lap counted correctly x2. QBL 200ml.     Indications:  (spontaneous vaginal delivery)  Pregnancy complicated by:   Patient Active Problem List   Diagnosis    Smoker    History of  premature rupture of membranes (PPROM)    Tetrahydrocannabinol (THC) use disorder, mild, abuse    Other insomnia    Bipolar depression    POTS (postural orthostatic tachycardia syndrome)    Anemia in pregnancy, third trimester    Decreased ROM of lumbar spine    General weakness    Chronic midline low back pain    Non-seasonal allergic rhinitis     (spontaneous vaginal delivery)    Single live birth    First degree laceration of perineum, delivered, current hospitalization     Admitting GA: 39w0d    Delivery Information for Girl April Brown    Birth information:  YOB: 2025   Time of birth: 4:06 PM   Sex: female   Head Delivery Date/Time: 2024  4:06 PM   Delivery type: Vaginal, Spontaneous   Gestational Age: 39w0d        Delivery Providers    Delivering clinician: Stacey Hernández CNM   Provider Role    Alize Reveles, RN Registered Nurse    Abby Vicente RN Registered Nurse      "         Measurements    Weight: 4090 g  Weight (lbs): 9 lb 0.3 oz  Length:          Apgars    Living status:   Apgar Component Scores:  1 min.:  5 min.:  10 min.:  15 min.:  20 min.:    Skin color:  0  1       Heart rate:  2  2       Reflex irritability:  2  2       Muscle tone:  2  2       Respiratory effort:  2  2       Total:  8  9       Apgars assigned by: MARKELL MANN RN         Operative Delivery    Forceps attempted?: No  Vacuum extractor attempted?: No         Shoulder Dystocia    Shoulder dystocia present?: No           Presentation    Presentation: Vertex  Position: Right Occiput Anterior           Interventions/Resuscitation    Method: Bulb Suctioning, Tactile Stimulation, Deep Suctioning       Cord    Vessels: 3 vessels  Complications: Nuchal  Nuchal Intervention: reduced  Nuchal Cord Description: loose nuchal cord  Number of Loops: 1  Delayed Cord Clamping?: Yes  Cord Clamped Date/Time: 2024  4:10 PM  Cord Blood Disposition: Discarded  Gases Sent?: No  Stem Cell Collection (by MD): No       Placenta    Placenta delivery date/time: 2024 1614  Placenta removal: Spontaneous  Placenta appearance: Intact  Placenta disposition: Discarded           Labor Events:       labor: No     Labor Onset Date/Time:         Dilation Complete Date/Time:         Start Pushing Date/Time:         Start Pushing Date/Time:       Rupture Date/Time: 24  105         Rupture type: ARM (Artificial Rupture)         Fluid Amount:       Fluid Color: Clear               steroids: None     Antibiotics given for GBS: No     Induction: amniotomy;oxytocin     Indications for induction:  Elective     Augmentation:       Indications for augmentation:       Labor complications: None     Additional complications:          Cervical ripening:                     Delivery:      Episiotomy: None     Indication for Episiotomy:       Perineal Lacerations: 1st Repaired:  Yes   Periurethral Laceration:   Repaired:      Labial Laceration:   Repaired:     Sulcus Laceration:   Repaired:     Vaginal Laceration:   Repaired:     Cervical Laceration:   Repaired:     Repair suture:       Repair # of packets: 1     Last Value - EBL - Nursing (mL):       Sum - EBL - Nursing (mL): 0     Last Value - EBL - Anesthesia (mL):      Calculated QBL (mL):       Running total QBL (mL):       Vaginal Sweep Performed: No     Surgicount Correct: Yes     Vaginal Packing: No Quantity:       Other providers:       Anesthesia    Method: Epidural          Details (if applicable):  Trial of Labor      Categorization:      Priority:     Indications for :     Incision Type:       Additional  information:  Forceps:    Vacuum:    Breech:    Observed anomalies    Other (Comments):

## 2024-08-18 PROCEDURE — 25000003 PHARM REV CODE 250: Performed by: MIDWIFE

## 2024-08-18 PROCEDURE — 11000001 HC ACUTE MED/SURG PRIVATE ROOM

## 2024-08-18 PROCEDURE — 99231 SBSQ HOSP IP/OBS SF/LOW 25: CPT | Mod: ,,, | Performed by: ADVANCED PRACTICE MIDWIFE

## 2024-08-18 RX ADMIN — MIRTAZAPINE 15 MG: 15 TABLET, FILM COATED ORAL at 09:08

## 2024-08-18 RX ADMIN — IBUPROFEN 600 MG: 600 TABLET ORAL at 09:08

## 2024-08-18 RX ADMIN — HYDROCODONE BITARTRATE AND ACETAMINOPHEN 1 TABLET: 5; 325 TABLET ORAL at 10:08

## 2024-08-18 RX ADMIN — FERROUS SULFATE TAB 325 MG (65 MG ELEMENTAL FE) 1 EACH: 325 (65 FE) TAB at 08:08

## 2024-08-18 RX ADMIN — IBUPROFEN 600 MG: 600 TABLET ORAL at 03:08

## 2024-08-18 RX ADMIN — IBUPROFEN 600 MG: 600 TABLET ORAL at 05:08

## 2024-08-18 NOTE — PLAN OF CARE
Patient afebrile and had no falls this shift. Fundus firm without massage and below umbilicus. Bleeding light, no clots passed this shift. Voids spontaneously. Ambulates independently. Pain well controlled with oral pain medication. Vital signs stable at this time. Bonding well with infant; responds to infant cues and participates in infant care.       Problem: Adult Inpatient Plan of Care  Goal: Plan of Care Review  Outcome: Progressing  Goal: Patient-Specific Goal (Individualized)  Outcome: Progressing  Goal: Absence of Hospital-Acquired Illness or Injury  Outcome: Progressing  Goal: Optimal Comfort and Wellbeing  Outcome: Progressing  Goal: Readiness for Transition of Care  Outcome: Progressing     Problem: Infection  Goal: Absence of Infection Signs and Symptoms  Outcome: Progressing     Problem:  Fall Injury Risk  Goal: Absence of Fall, Infant Drop and Related Injury  Outcome: Progressing

## 2024-08-18 NOTE — ASSESSMENT & PLAN NOTE
AROM at 1052  Bolus for epidural  Start Pitocin once comfortable  Baby 48%tile, on 7/5. EFW 7.5lb by Leopold's.  Monitor maternal/fetal status closely  Anticipate progress and vaginal birth  
Care per peds  Formula feeding female  
H&H 10.1/30.8 on admit  Continue PO iron PP  
H&H 10.1/30.8 on admit  Continue PO iron PP  
Remeron, monitor PP  
Remeron, monitor PP  
Routine PP Care  
Routine Pericare  
Uds on admit  
Uds on admit  
No

## 2024-08-18 NOTE — ANESTHESIA POSTPROCEDURE EVALUATION
Anesthesia Post Evaluation    Patient: Kristina Khoury    Procedure(s) Performed: * No procedures listed *    Final Anesthesia Type: epidural      Patient location during evaluation: labor & delivery  Patient participation: Yes- Able to Participate  Level of consciousness: awake and alert, awake and oriented  Post-procedure vital signs: reviewed and stable  Pain management: adequate  Airway patency: patent    PONV status at discharge: No PONV  Anesthetic complications: no      Cardiovascular status: blood pressure returned to baseline and hemodynamically stable  Respiratory status: unassisted and spontaneous ventilation  Hydration status: euvolemic  Follow-up not needed.              Vitals Value Taken Time   /78 08/17/24 1637   Temp 36.7 °C (98.1 °F) 08/17/24 1330   Pulse 87 08/17/24 1711   Resp 18 08/17/24 1900   SpO2 98 % 08/17/24 1711   Vitals shown include unfiled device data.      No case tracking events are documented in the log.      Pain/Blaire Score: Pain Rating Prior to Med Admin: 0 (8/17/2024  6:28 PM)

## 2024-08-18 NOTE — PLAN OF CARE
Patient afebrile and had no falls this shift. Fundus firm without massage and below umbilicus. Bleeding light, no clots passed this shift. Voids spontaneously. Ambulates independently. Pain well controlled with oral pain medication. Vital signs stable at this time. Bonding well with infant; responds to infant cues and participates in infant care. Care plan ongoing.

## 2024-08-18 NOTE — SUBJECTIVE & OBJECTIVE
Interval History:     She is doing well this morning. She is tolerating a regular diet without nausea or vomiting. She is voiding spontaneously. She is ambulating. Vaginal bleeding is mild. She denies fever or chills. Abdominal pain is mild and controlled with oral medications. She Is not breastfeeding. She desires circumcision for her male baby: not applicable.    Objective:     Vital Signs (Most Recent):  Temp: 97.9 °F (36.6 °C) (08/18/24 0737)  Pulse: 82 (08/18/24 0737)  Resp: 18 (08/18/24 0737)  BP: (!) 95/51 (08/18/24 0737)  SpO2: 98 % (08/18/24 0737) Vital Signs (24h Range):  Temp:  [97.9 °F (36.6 °C)-98.8 °F (37.1 °C)] 97.9 °F (36.6 °C)  Pulse:  [] 82  Resp:  [18] 18  SpO2:  [98 %-100 %] 98 %  BP: ()/(40-81) 95/51        There is no height or weight on file to calculate BMI.      Intake/Output Summary (Last 24 hours) at 8/18/2024 0959  Last data filed at 8/18/2024 0755  Gross per 24 hour   Intake 793.16 ml   Output 1500 ml   Net -706.84 ml         Significant Labs:  Lab Results   Component Value Date    GROUPTRH A POS 08/17/2024    HEPBSAG Non-reactive 01/03/2024    STREPBCULT No Group B Streptococcus isolated 07/02/2024     Recent Labs   Lab 08/17/24  1042   HGB 10.1*   HCT 30.8*       I have personallly reviewed all pertinent lab results from the last 24 hours.    Physical Exam:   Constitutional: She is oriented to person, place, and time. She appears well-developed and well-nourished.    HENT:   Head: Normocephalic.      Cardiovascular:  Normal rate.             Pulmonary/Chest: Effort normal.        Abdominal: Soft. There is no abdominal tenderness.             Musculoskeletal: Normal range of motion and moves all extremeties.       Neurological: She is alert and oriented to person, place, and time.    Skin: Skin is warm and dry.    Psychiatric: She has a normal mood and affect. Her behavior is normal. Judgment and thought content normal.       Review of Systems

## 2024-08-18 NOTE — PROGRESS NOTES
[facility] ([facility])  Obstetrics  Postpartum Progress Note    Patient Name: Kristina Khoury  MRN: [id number]  Admission Date: 2024  Hospital Length of Stay: 1 days  Attending Physician: Feroz Ortega MD  Primary Care Provider: Alycia Packer FNP    Subjective:     Principal Problem: (spontaneous vaginal delivery)    Hospital Course:  AROM, epidural, Pitocin    24: PPD1, Routine PP Care    Interval History:     She is doing well this morning. She is tolerating a regular diet without nausea or vomiting. She is voiding spontaneously. She is ambulating. Vaginal bleeding is mild. She denies fever or chills. Abdominal pain is mild and controlled with oral medications. She Is not breastfeeding. She desires circumcision for her male baby: not applicable.    Objective:     Vital Signs (Most Recent):  Temp: 97.9 °F (36.6 °C) (24)  Pulse: 82 (24)  Resp: 18 (24)  BP: (!) 95/51 (24)  SpO2: 98 % (24) Vital Signs (24h Range):  Temp:  [97.9 °F (36.6 °C)-98.8 °F (37.1 °C)] 97.9 °F (36.6 °C)  Pulse:  [] 82  Resp:  [18] 18  SpO2:  [98 %-100 %] 98 %  BP: ()/(40-81) 95/51        There is no height or weight on file to calculate BMI.      Intake/Output Summary (Last 24 hours) at 2024 0959  Last data filed at 2024 0755  Gross per 24 hour   Intake 793.16 ml   Output 1500 ml   Net -706.84 ml         Significant Labs:  Lab Results   Component Value Date    GROUPTRH A POS 2024    HEPBSAG Non-reactive 2024    STREPBCULT No Group B Streptococcus isolated 2024     Recent Labs   Lab 24  1042   HGB 10.1*   HCT 30.8*       I have personallly reviewed all pertinent lab results from the last 24 hours.    Physical Exam:   Constitutional: She is oriented to person, place, and time. She appears well-developed and well-nourished.    HENT:   Head: Normocephalic.      Cardiovascular:  Normal rate.              Pulmonary/Chest: Effort normal.        Abdominal: Soft. There is no abdominal tenderness.             Musculoskeletal: Normal range of motion and moves all extremeties.       Neurological: She is alert and oriented to person, place, and time.    Skin: Skin is warm and dry.    Psychiatric: She has a normal mood and affect. Her behavior is normal. Judgment and thought content normal.       Review of Systems  Assessment/Plan:     29 y.o. female  for:    *  (spontaneous vaginal delivery)  Routine PP Care    First degree laceration of perineum, delivered, current hospitalization  Routine Pericare    Single live birth  Care per peds  Formula feeding female    Anemia in pregnancy, third trimester  H&H 10.130.8 on admit  Continue PO iron PP    Bipolar depression  Remeron, monitor PP    Tetrahydrocannabinol (THC) use disorder, mild, abuse  Uds on admit        Disposition: As patient meets milestones, will plan to discharge tomorrow.    Marco Greenberg CNM  Obstetrics  [facility] ([facility])

## 2024-08-19 VITALS
DIASTOLIC BLOOD PRESSURE: 59 MMHG | OXYGEN SATURATION: 97 % | RESPIRATION RATE: 17 BRPM | SYSTOLIC BLOOD PRESSURE: 116 MMHG | TEMPERATURE: 99 F | HEART RATE: 91 BPM

## 2024-08-19 PROCEDURE — 25000003 PHARM REV CODE 250: Performed by: MIDWIFE

## 2024-08-19 PROCEDURE — 99238 HOSP IP/OBS DSCHRG MGMT 30/<: CPT | Mod: UC,,, | Performed by: MIDWIFE

## 2024-08-19 RX ORDER — IBUPROFEN 600 MG/1
600 TABLET ORAL EVERY 6 HOURS PRN
Qty: 30 TABLET | Refills: 0 | Status: SHIPPED | OUTPATIENT
Start: 2024-08-19

## 2024-08-19 RX ADMIN — FERROUS SULFATE TAB 325 MG (65 MG ELEMENTAL FE) 1 EACH: 325 (65 FE) TAB at 08:08

## 2024-08-19 NOTE — DISCHARGE INSTRUCTIONS
"Mother Self Care:    Activity: Avoid strenuous exercise and get adequate rest.  No driving until the physician consent given.  Emotional Changes: Most women find birth to be a time of great emotional upheaval.  Sense of loss, mood swings, fatigue, anxiety, and feeling "let down" are common.  If feelings worsen or last more than a week, call your physician.  Breast Care/Breastfeeding: Wear a bra for comfort.  Keep nipples dry and apply your own breast milk or lanolin cream as needed for soreness.  Engorgement can be relieved with warm, moist heat before feedings.  You may also take Ibuprofen.  Breast Care/Bottle Feeding: Wear support bra 24 hours a day for one week.  Avoid stimulation to breasts.  You may use ice packs for discomfort.  Blane-Care/Vaginal Bleeding: Remember to use your blane-bottle after urinating.  Your flow will change from red, to pink, to yellow/white color over a period of 2 weeks.  Menstruation will return in 3-8 weeks, or longer if breastfeeding.  Episiotomy Vaginal Delivery: Stitches will dissolve within 10 days to 3 weeks.  Warm baths, tucks, and dermoplast will promote healing.  Avoid bubble baths or strong soaps.   Section/Tubal Ligation: Keep incision clean and dry. You may shower, but avoid baths. Please continue to use Nozin twice a day for 7 days after surgery. Ensure you attend your 1 week post op visit to have your dressing removed. Use antibacterial soap to wash your entire body until directed otherwise by a Physician, it is very important to shower daily. If you become concerned about the way your incision site looks, please contact your providers office.   Sexual Activity/Pelvic Rest: No sexual activity, tampons, or douching until your physician gives you consent.  Diet: Continue to eat from the five basic food groups, including plenty of protein, fruits, vegetables, and whole grains.  Limit empty calories and high fat foods.  Drink enough fluids to satisfy thirst and add an " "extra 500 calories for breastfeeding.  Constipation/Hemorrhoids: Drink plenty of water.  You may take a stool softener or natural laxative (Metamucil). You may use tucks or hemorrhoid ointment and soak in a warm tub.    "What does help look like to you when you go home?"  "Is there any need that you anticipate that we may be able to assist with?"    CALL YOUR OB DOCTOR IF ANY OF THE FOLLOWING OCCURS:  *Heavy bleeding - saturating a pad an hour or passing any large (2-3 inches in size) blood clots.  *Any pain, redness, or tenderness in lower leg.  *You cannot care for yourself or your baby.  *Any signs of infection-      - Temperature greater than 100.5 degrees F      - Foul smelling vaginal discharge and/or incisional drainage      - Increased episiotomy or incisional pain      - Hot, hard, red or sore area on breast      - Flu-like symptoms      - Any urgency, frequency or burning with urination    Return To the Hospital for further Evaluation:  Headache not relieved by tylenol or ibuprofen  Blurry vision, double vision, seeing spots, or flashing lights  Feeling faint or passing out  Right epigastric pain  Difficulty breathing  Swelling in hands, face, or feet  Any of these symptoms accompanied by nausea/vomiting  Gaining more than 5 pounds in one week  Seizures  These symptoms could be an indication of elevated blood pressure.     For patients that were treated for high blood pressure during pregnancy and or your hospital stay you will need a blood pressure check three days after you leave the hospital. Your nursing staff will assist you in an appointment if needed. If you have Connected Mom you may use your blood pressure cuff and report any readings 140/90 to your provider immediately.       If you have any questions that need to be answered immediately please call the Labor & Delivery Unit at [phone] and ask to speak to a nurse.      Please see [facility] BLUE folder for additional information and handouts. "

## 2024-08-19 NOTE — PLAN OF CARE
,D/c instructions taught to mom. Mom verbalized understanding. No c/o pain. Mom and baby bracelets matched. Mom signed identification sheet.

## 2024-08-19 NOTE — PLAN OF CARE
[facility] (Hospital)  Discharge Assessment    Primary Care Provider: Alycia Packer FNP     OB Screen (most recent)       OB Screen - 24 0804          OB SCREEN    Assessment Type Discharge Planning Assessment     Source of Information patient;health record     Received Prenatal Care Yes     Any indications/suspicions for None     Is this a teen pregnancy No     Is the baby in NICU No     Indication for adoption/Safe Haven No     Indication for DME/post-acute needs No     HIV (+) No     Any congenital  disorders No     Fetal demise/ death No

## 2024-08-19 NOTE — DISCHARGE SUMMARY
[facility] ([facility])  Obstetrics  Discharge Summary      Patient Name: Kristina Khoury  MRN: [id number]  Admission Date: 2024  Hospital Length of Stay: 2 days  Discharge Date and Time:  2024 9:25 AM  Attending Physician: Feroz Ortega MD   Discharging Provider: Abril Hale CNM   Primary Care Provider: Alycia Packer FNP    HPI:  39w here for EIOL        * No surgery found *     Hospital Course:   AROM, epidural, Pitocin    24: PPD1, Routine PP Care  24-PPD2, discharge instructions reviewed          Final Active Diagnoses:    Diagnosis Date Noted POA    PRINCIPAL PROBLEM:   (spontaneous vaginal delivery) [O80] 2024 Not Applicable    Single live birth [Z37.0] 2024 Not Applicable    First degree laceration of perineum, delivered, current hospitalization [O70.0] 2024 No    Anemia in pregnancy, third trimester [O99.013] 2024 Yes    Bipolar depression [F31.9] 2024 Yes    Tetrahydrocannabinol (THC) use disorder, mild, abuse [F12.10] 2024 Yes      Problems Resolved During this Admission:    Diagnosis Date Noted Date Resolved POA    Encounter for elective induction of labor [Z34.90] 2024 Not Applicable        Significant Diagnostic Studies: Labs: All labs within the past 24 hours have been reviewed      Feeding Method: bottle    Immunizations       Date Immunization Status Dose Route/Site Given by    09 0000 Tdap Given  Intramuscular/Left arm     10/08/04 0000 MMR Given  Subcutaneous/Left arm     02/15/00 0000 MMR Given       94 0000 DTP Given       02/15/00 0000 DTP Given       10/24/94 0000 DTP Given       94 0000 DTP Given       10/24/94 0000 Hepatitis B, Pediatric/Adolescent Given       94 0000 Hepatitis B, Pediatric/Adolescent Given       94 0000 Hepatitis B, Pediatric/Adolescent Given       94 0000 HIB Given       94 0000 HIB Given       10/24/94 0000 HIB Given        "10/19/11 0000 HPV Quadrivalent Given       09 0000 Meningococcal Conjugate (MCV4P) Given  Intramuscular/Right arm     94 0000 OPV Given       02/15/00 0000 OPV Given       10/24/94 0000 OPV Given       94 0000 OPV Given       24 1732 MMR Incomplete 0.5 mL Subcutaneous/     24 1732 Tdap Incomplete 0.5 mL Intramuscular/             Delivery:    Episiotomy: None   Lacerations: 1st   Repair suture:     Repair # of packets: 1   Blood loss (ml):       Birth information:  YOB: 2025   Time of birth: 4:06 PM   Sex: female   Delivery type: Vaginal, Spontaneous   Gestational Age: 39w0d     Measurements    Weight: 4090 g  Weight (lbs): 9 lb 0.3 oz  Length: 52.1 cm  Length (in): 20.5"  Head circumference: 36.2 cm  Chest circumference: 36.2 cm  Abdominal girth: 36.8 cm         Delivery Clinician: Delivery Providers    Delivering clinician: Stacey Hernández CNM   Provider Role    Alize Reveles RN Registered Nurse    Abby Vicente RN Registered Nurse             Additional  information:  Forceps:    Vacuum:    Breech:    Observed anomalies      Living?:     Apgars    Living status: Living  Apgar Component Scores:  1 min.:  5 min.:  10 min.:  15 min.:  20 min.:    Skin color:  0  1       Heart rate:  2  2       Reflex irritability:  2  2       Muscle tone:  2  2       Respiratory effort:  2  2       Total:  8  9       Apgars assigned by: MARKELL VICENTE RN         Placenta: Delivered:       appearance  Pending Diagnostic Studies:       None            Discharged Condition: good    Disposition: Home or Self Care    Follow Up:    Patient Instructions:      Diet Adult Regular     Pelvic Rest     Notify your health care provider if you experience any of the following:  temperature >100.4     Notify your health care provider if you experience any of the following:  persistent nausea and vomiting or diarrhea     Notify your health care provider if you experience any of the following:  " severe uncontrolled pain     Notify your health care provider if you experience any of the following:  difficulty breathing or increased cough     Notify your health care provider if you experience any of the following:  severe persistent headache     Notify your health care provider if you experience any of the following:  persistent dizziness, light-headedness, or visual disturbances     Notify your health care provider if you experience any of the following:  increased confusion or weakness     Notify your health care provider if you experience any of the following:   Order Comments: Increased vaginal bleeding, dizziness, headache, blurred vision, and/or signs of postpartum depression     Medications:  Current Discharge Medication List        START taking these medications    Details   ibuprofen (ADVIL,MOTRIN) 600 MG tablet Take 1 tablet (600 mg total) by mouth every 6 (six) hours as needed for Pain.  Qty: 30 tablet, Refills: 0    Comments: Please deliver to bedside [phone]           CONTINUE these medications which have NOT CHANGED    Details   ferrous sulfate 324 mg (65 mg iron) TbEC Take 1 tablet (324 mg total) by mouth once daily.  Qty: 30 tablet, Refills: 5    Associated Diagnoses: Anemia in pregnancy, third trimester      mirtazapine (REMERON) 15 MG tablet Take 15 mg by mouth every evening.      prenatal vit/iron fum/folic ac (RIGHT STEP PRENATAL VITAMINS ORAL) Take by mouth. Taking gummies      pyridoxine, vitamin B6, (VITAMIN B-6) 50 MG Tab Take 50 mg by mouth once daily.             Abril Hale CNM  Obstetrics  [facility] ([facility])

## 2024-08-19 NOTE — PLAN OF CARE
Patient afebrile and had no falls this shift. Fundus firm without massage and below umbilicus. Bleeding light, no clots passed this shift. Voids spontaneously. Ambulates independently. Pain well controlled with oral pain medication. Vital signs stable at this time. Bonding well with infant; responds to infant cues and participates in infant care. Will continue to monitor.         Problem: Adult Inpatient Plan of Care  Goal: Plan of Care Review  Outcome: Progressing  Goal: Patient-Specific Goal (Individualized)  Outcome: Progressing  Goal: Absence of Hospital-Acquired Illness or Injury  Outcome: Progressing  Goal: Optimal Comfort and Wellbeing  Outcome: Progressing  Goal: Readiness for Transition of Care  Outcome: Progressing     Problem: Infection  Goal: Absence of Infection Signs and Symptoms  Outcome: Progressing     Problem:  Fall Injury Risk  Goal: Absence of Fall, Infant Drop and Related Injury  Outcome: Progressing

## 2024-09-10 NOTE — PLAN OF CARE
BABY'S MECONIUM POSITIVE FOR THC.     A REPORT CALLED INTO [facility] HOTLINE [phone]. WORKER CATA TOOK REPORT. REPORT NUMBER IS [id number].    ONLINE REPORT FILED AS WELL.

## 2024-09-19 ENCOUNTER — TELEPHONE (OUTPATIENT)
Dept: OBSTETRICS AND GYNECOLOGY | Facility: CLINIC | Age: 29
End: 2024-09-19
Payer: MEDICAID

## 2024-09-19 NOTE — TELEPHONE ENCOUNTER
----- Message from Yola Melvin sent at 9/19/2024  7:58 AM CDT -----  States her kids school bus was late, so she is not going to make her 8:15 appt this morning. She would like to get a morning appt scheduled for today. Nothing coming up on the schedule. Please call pt [phone]. Thank you

## 2024-09-19 NOTE — TELEPHONE ENCOUNTER
Pt called in regards to kids bus being late and unable to make appt today. Pt states she lives in [place], and prefer mid morning appts.  Appt scheduled for 9/25       Lou CUEVAS LPN  OB/GYN

## 2024-09-24 ENCOUNTER — POSTPARTUM VISIT (OUTPATIENT)
Dept: OBSTETRICS AND GYNECOLOGY | Facility: CLINIC | Age: 29
End: 2024-09-24
Payer: MEDICAID

## 2024-09-24 VITALS
BODY MASS INDEX: 31.87 KG/M2 | DIASTOLIC BLOOD PRESSURE: 68 MMHG | SYSTOLIC BLOOD PRESSURE: 118 MMHG | WEIGHT: 179.88 LBS

## 2024-09-24 DIAGNOSIS — M25.552 BILATERAL HIP PAIN: ICD-10-CM

## 2024-09-24 DIAGNOSIS — R10.2 VAGINAL PAIN: ICD-10-CM

## 2024-09-24 DIAGNOSIS — M25.551 BILATERAL HIP PAIN: ICD-10-CM

## 2024-09-24 PROCEDURE — 99999 PR PBB SHADOW E&M-EST. PATIENT-LVL III: CPT | Mod: PBBFAC,,, | Performed by: MIDWIFE

## 2024-09-24 PROCEDURE — 99213 OFFICE O/P EST LOW 20 MIN: CPT | Mod: PBBFAC | Performed by: MIDWIFE

## 2024-09-24 RX ORDER — CARIPRAZINE 1.5 MG/1
1.5 CAPSULE, GELATIN COATED ORAL
COMMUNITY
Start: 2024-09-13

## 2024-09-24 RX ORDER — FLUCONAZOLE 100 MG/1
100 TABLET ORAL DAILY
Qty: 3 TABLET | Refills: 0 | Status: SHIPPED | OUTPATIENT
Start: 2024-09-24 | End: 2024-09-27

## 2024-09-24 RX ORDER — ATOMOXETINE 40 MG/1
40 CAPSULE ORAL
COMMUNITY
Start: 2024-09-13

## 2024-09-27 ENCOUNTER — CLINICAL SUPPORT (OUTPATIENT)
Dept: REHABILITATION | Facility: HOSPITAL | Age: 29
End: 2024-09-27
Payer: MEDICAID

## 2024-09-27 DIAGNOSIS — M25.551 BILATERAL HIP PAIN: ICD-10-CM

## 2024-09-27 DIAGNOSIS — M25.552 BILATERAL HIP PAIN: ICD-10-CM

## 2024-09-27 DIAGNOSIS — M62.81 WEAKNESS OF TRUNK MUSCULATURE: ICD-10-CM

## 2024-09-27 DIAGNOSIS — M53.3 SI (SACROILIAC) JOINT DYSFUNCTION: Primary | ICD-10-CM

## 2024-09-27 DIAGNOSIS — R10.2 VAGINAL PAIN: ICD-10-CM

## 2024-09-27 PROBLEM — O99.013 ANEMIA IN PREGNANCY, THIRD TRIMESTER: Status: RESOLVED | Noted: 2024-06-06 | Resolved: 2024-09-27

## 2024-09-27 PROCEDURE — 97161 PT EVAL LOW COMPLEX 20 MIN: CPT | Mod: PN

## 2024-09-27 PROCEDURE — 97110 THERAPEUTIC EXERCISES: CPT | Mod: PN

## 2024-09-27 NOTE — PLAN OF CARE
[facility]   Pelvic Health Physical Therapy Initial Evaluation     Date: 2024   Name: Kristina Khoury  Clinic Number: [id number]    Therapy Diagnosis:   Encounter Diagnoses   Name Primary?    Bilateral hip pain     Vaginal pain     SI (sacroiliac) joint dysfunction Yes    Weakness of trunk musculature      Physician: Abril Hale CNM    Physician Orders: PT Eval and Treat   Medical Diagnosis from Referral: Bilateral hip pain [M25.551, M25.552], Vaginal pain [R10.2]   Evaluation Date: 2024  Authorization Period Expiration: 2024  Plan of Care Expiration: 2024  Progress Note Due: 10/27/2024  Visit # / Visits authorized:  eval   FOTO: Issued Visit #: 1 /3    Precautions: Standard    Time In: 12:00  Time Out: 12:40  Total Appointment Time (timed & untimed codes): 40 minutes    SUBJECTIVE     Date of onset: postpartum as well as during pregnancy    History of current condition - April reports: pain in both hips where her dimples are located. She also reported left sided sciatic pains in left buttock. No numbness or tingling reported. Patient has pain in both feet and hips with prolonged standing and walking.Not currently breastfeeding.       OB/GYN History: , vaginal delivery, and perineal laceration      No bladder or bowel issues reported.     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     Surgical History: Kristina Khoury  has a past surgical history that includes Pressure equalizer tubes.    Medications: April has a current medication list which includes the following prescription(s): atomoxetine, fluconazole, mirtazapine, and vraylar.    Allergies:   Review of patient's allergies indicates:  No Known Allergies     OBJECTIVE     ORTHO SCREEN  Posture in sitting: slouched   Posture in standing: WNL  Pelvic alignment: no sign of deviations noted in supine   SI Joint Palpation: Tenderness to the right SI joint palpation. Tenderness to the left SI joint palpation.  Adductor Palpation: Not assessed today     ABDOMINAL WALL ASSESSMENT  Palpation: boggy and hypotonic   Abdominal strength: Transverse abdominus: poor to fair  Scarring: none noted  Diastasis: absent with curl up test      BREATHING MECHANICS ASSESSMENT   Thorax Assessment During Deep Respiration: Decreased excursion bilaterally of lateral ribs       Lumbar Active Range of Motion:      ROM    Percentage  Pain    Flexion  75%    Extension  50% Yes   Left Side Bending  Past knee joint    Right Side Bending  Past knee joint    Left Rotation  WNL    Right Rotation  WNL       LE Strength Testing:   Hip Left Right   Flexion 4+/5 4/5   Abduction 4/5 4/5   Adduction 4/5 4/5   External Rot 4+/5 4+/5   Internal Rot 4+/5 4+/5     Knee Left Right   Extension 5/5 5/5   Flexion 5/5 5/5     Ankle  Left Right   Dorsiflexion 5/5 5/5     Special Tests for Load Transfer Assessment Between the Trunk and Lower Extremities:    Active Straight Leg Test:    Right: sense of heaviness or weakness    Left: sense of heaviness or weakness    Active Straight Leg Test with Pelvic Overpressure:   Right: decreased sense of heaviness or weakness    Left: decreased sense of heaviness or weakness        Limitation/Restriction for FOTO Pelvic Floor Survey    Therapist reviewed FOTO scores for Kristina Khoury on 9/27/2024.   FOTO documents entered into [facility] - see Media section.    Limitation Score: PFDI Pain 42       TREATMENT     Total Treatment time (time-based codes) separate from Evaluation: 10 minutes     Therapeutic Activity to improve dynamic functional performance for 10 minutes including:    Reviewed HEP:  TA contraction   Shotgun MET  Posterior pelvic tilts  Seated sciatic nerve glides     Education on benefits of SI joint belt    PATIENT EDUCATION AND HOME EXERCISES     Education provided:   general anatomy/physiology of urinary/ bowel  system and benefits of treatment were discussed with the patient. Additionally, anatomy/physiology of pelvic floor, posture/body mechanices, diaphragmatic breathing, isometric abdominal exercises, and SI joint belt were reviewed.     Written Home Exercises provided: yes.  Exercises were reviewed and April was able to demonstrate them prior to the end of the session. April demonstrated good  understanding of the education provided. See EMR under Patient Instructions for exercises provided during therapy sessions.    ASSESSMENT     April is a 29 y.o. female referred to outpatient Physical Therapy with a medical diagnosis of Bilateral hip pain [M25.551, M25.552], Vaginal pain [R10.2] . Pt presents with altered posture, poor knowledge of body mechanics and posture, poor trunk stability, and impairments in strength, endurance, mobility, functional movement.       Patient prognosis is  Good.   Patient will benefit from skilled outpatient Physical Therapy to address the deficits stated above and in the chart below, provide patient/family education, and to maximize patient's level of independence.     Plan of care discussed with patient: Yes  Patient's spiritual, cultural and educational needs considered and patient is agreeable to the plan of care and goals as stated below:     Anticipated Barriers for therapy: none    Medical Necessity is demonstrated by the following:    History  Co-morbidities and personal factors that may impact the plan of care [] LOW: no personal factors / co-morbidities  [x] MODERATE: 1-2 personal factors / co-morbidities  [] HIGH: 3+ personal factors / co-morbidities    Moderate / High Support Documentation:   Co-morbidities affecting plan of care: April  has a past medical history of ADD (attention deficit disorder), 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 hyperactivity, Depression, OCD (obsessive compulsive disorder), PTSD (post-traumatic stress disorder), Sciatica, Scoliosis, and Vaginal laceration (12/23/2014).     Personal Factors:   no deficits     Examination  Body Structures and Functions, activity limitations and participation restrictions that may impact the plan of care [] LOW: addressing 1-2 elements  [x] MODERATE: 3+ elements  [] HIGH: 4+ elements (please support below)    Moderate / High Support Documentation: altered posture, poor knowledge of body mechanics and posture, poor trunk stability, and impairments in strength, endurance, mobility, functional movement     Clinical Presentation [x] LOW: stable  [] MODERATE: Evolving  [] HIGH: Unstable     Decision Making/ Complexity Score: low        Goals:  Short Term Goals:  4 weeks  HEP: Patient will demonstrate 50% independence with HEP   Pain: Pt will demonstrate improved pain less than or equal to 4/10   Mobility: Patient will improve active lumbar range of motion to within normal limits without pain.     Long  Term Goals:  8 weeks  HEP: Pt will be independent with advanced HEP.  Pain: Pt will demonstrate improved pain less than or equal to 2/10 in order to progress toward maximal functional ability and improve QOL.  Function: Patient will decrease functional limitation score to less than or equal to 15 out of 100 on FOTO to improve functional independence and quality of life.   Mobility: Patient will demonstrate ability to walking 1/4 of a mile without an increase in lower back pain to return to prior level of function.   Strength: Patient will improve impaired strength to greater or equal to 4+/5 in order to perform a squat without difficulty or pain.        PLAN     Plan of care Certification: 9/27/2024 to 11/22/2024.    Outpatient Physical Therapy 1-2 time(s) every week(s) for 8 weeks to include the following interventions: Cervical/Lumbar Traction, Electrical Stimulation TENS/IFC, Gait Training, Manual Therapy, Moist Heat/ Ice, Neuromuscular Re-ed, Patient Education, Self Care, Therapeutic Activities, Therapeutic Exercise, and ASTYM, and FDN.     Binu Patel, PT      I CERTIFY THE NEED FOR THESE SERVICES FURNISHED UNDER THIS PLAN OF TREATMENT AND WHILE UNDER MY CARE   Physician's comments:     Physician's Signature: ___________________________________________________

## 2024-09-27 NOTE — PROGRESS NOTES
Subjective:       Kristina Khoury is a 29 y.o. female who presents for a postpartum visit. She is 5 weeks postpartum following a spontaneous vaginal delivery. I have fully reviewed the prenatal and intrapartum course. The delivery was at 39w0d gestational weeks. Outcome: spontaneous vaginal delivery. Anesthesia: epidural. Postpartum course has been uncomplicated. Baby's course has been uncomplicated. Baby is feeding by formula. Bleeding no bleeding. Bowel function is normal. Bladder function is normal. Patient is not sexually active. Contraception method is none. Postpartum depression screening: negative. C/o vaginal odor and hip pain today.     The following portions of the patient's history were reviewed and updated as appropriate: allergies, current medications, past family history, past medical history, past social history, past surgical history, and problem list.    Review of Systems  A comprehensive review of systems was negative.     Objective:      /68   Wt 81.6 kg (179 lb 14.3 oz)   LMP 11/15/2023   Breastfeeding No   BMI 31.87 kg/m²    General:  alert, appears stated age, and cooperative               Abdomen: soft, non-tender; bowel sounds normal; no masses,  no organomegaly    Vulva:  normal   Vagina: normal vagina and thin yellow tinged discharge noted. Laceration completely healed with no evidence of suture                          Assessment:      Routine postpartum exam. Pap smear not done at today's visit.     Plan:      1. Contraception: none, declines. Patient planning on BTL in the future but not at this time.   2. Wet prep + yeast hyphae, Diflucan sent to pharmacy   3. PT referral for hip pain placed   3. Follow up in: 1  year for annual  or as needed.

## 2024-09-27 NOTE — PROGRESS NOTES
[facility]   Pelvic Health Physical Therapy Initial Evaluation     Date: 2024   Name: Kristina Khoury  Clinic Number: [id number]    Therapy Diagnosis:   Encounter Diagnoses   Name Primary?    Bilateral hip pain     Vaginal pain     SI (sacroiliac) joint dysfunction Yes    Weakness of trunk musculature      Physician: Abril Hale CNM    Physician Orders: PT Eval and Treat   Medical Diagnosis from Referral: Bilateral hip pain [M25.551, M25.552], Vaginal pain [R10.2]   Evaluation Date: 2024  Authorization Period Expiration: 2024  Plan of Care Expiration: 2024  Progress Note Due: 10/27/2024  Visit # / Visits authorized:  eval   FOTO: Issued Visit #: 1 /3    Precautions: Standard    Time In: 12:00  Time Out: 12:40  Total Appointment Time (timed & untimed codes): 40 minutes    SUBJECTIVE     Date of onset: postpartum as well as during pregnancy    History of current condition - April reports: pain in both hips where her dimples are located. She also reported left sided sciatic pains in left buttock. No numbness or tingling reported. Patient has pain in both feet and hips with prolonged standing and walking.Not currently breastfeeding.       OB/GYN History: , vaginal delivery, and perineal laceration      No bladder or bowel issues reported.     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     Surgical History: Kristina Khoury  has a past surgical history that includes Pressure equalizer tubes.    Medications: April has a current medication list which includes the following prescription(s): atomoxetine, fluconazole, mirtazapine, and vraylar.    Allergies:   Review of patient's allergies indicates:  No Known Allergies     OBJECTIVE     ORTHO SCREEN  Posture in sitting: slouched   Posture in standing: WNL  Pelvic alignment: no sign of deviations noted in supine   SI Joint Palpation: Tenderness to the right SI joint palpation. Tenderness to the left SI joint palpation.  Adductor Palpation: Not assessed today     ABDOMINAL WALL ASSESSMENT  Palpation: boggy and hypotonic   Abdominal strength: Transverse abdominus: poor to fair  Scarring: none noted  Diastasis: absent with curl up test      BREATHING MECHANICS ASSESSMENT   Thorax Assessment During Deep Respiration: Decreased excursion bilaterally of lateral ribs       Lumbar Active Range of Motion:      ROM    Percentage  Pain    Flexion  75%    Extension  50% Yes   Left Side Bending  Past knee joint    Right Side Bending  Past knee joint    Left Rotation  WNL    Right Rotation  WNL       LE Strength Testing:   Hip Left Right   Flexion 4+/5 4/5   Abduction 4/5 4/5   Adduction 4/5 4/5   External Rot 4+/5 4+/5   Internal Rot 4+/5 4+/5     Knee Left Right   Extension 5/5 5/5   Flexion 5/5 5/5     Ankle  Left Right   Dorsiflexion 5/5 5/5     Special Tests for Load Transfer Assessment Between the Trunk and Lower Extremities:    Active Straight Leg Test:    Right: sense of heaviness or weakness    Left: sense of heaviness or weakness    Active Straight Leg Test with Pelvic Overpressure:   Right: decreased sense of heaviness or weakness    Left: decreased sense of heaviness or weakness        Limitation/Restriction for FOTO Pelvic Floor Survey    Therapist reviewed FOTO scores for Kristina Khoury on 9/27/2024.   FOTO documents entered into [employer] - see Media section.    Limitation Score: PFDI Pain 42       TREATMENT     Total Treatment time (time-based codes) separate from Evaluation: 10 minutes     Therapeutic Activity to improve dynamic functional performance for 10 minutes including:    Reviewed HEP:  TA contraction   Shotgun MET  Posterior pelvic tilts  Seated sciatic nerve glides     Education on benefits of SI joint belt    PATIENT EDUCATION AND HOME EXERCISES     Education provided:   general anatomy/physiology of urinary/ bowel  system and benefits of treatment were discussed with the patient. Additionally, anatomy/physiology of pelvic floor, posture/body mechanices, diaphragmatic breathing, isometric abdominal exercises, and SI joint belt  were reviewed.     Written Home Exercises provided: yes.  Exercises were reviewed and April was able to demonstrate them prior to the end of the session. April demonstrated good  understanding of the education provided. See EMR under Patient Instructions for exercises provided during therapy sessions.    ASSESSMENT     April is a 29 y.o. female referred to outpatient Physical Therapy with a medical diagnosis of Bilateral hip pain [M25.551, M25.552], Vaginal pain [R10.2] . Pt presents with altered posture, poor knowledge of body mechanics and posture, poor trunk stability, and impairments in strength, endurance, mobility, functional movement .       Patient prognosis is  Good.   Patient will benefit from skilled outpatient Physical Therapy to address the deficits stated above and in the chart below, provide patient/family education, and to maximize patient's level of independence.     Plan of care discussed with patient: Yes  Patient's spiritual, cultural and educational needs considered and patient is agreeable to the plan of care and goals as stated below:     Anticipated Barriers for therapy: none    Medical Necessity is demonstrated by the following:    History  Co-morbidities and personal factors that may impact the plan of care [] LOW: no personal factors / co-morbidities  [x] MODERATE: 1-2 personal factors / co-morbidities  [] HIGH: 3+ personal factors / co-morbidities    Moderate / High Support Documentation:   Co-morbidities affecting plan of care: April  has a past medical history of ADD (attention deficit disorder), 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 hyperactivity, Depression, OCD (obsessive compulsive disorder), PTSD (post-traumatic stress disorder), Sciatica, Scoliosis, and Vaginal laceration (12/23/2014).     Personal Factors:   no deficits     Examination  Body Structures and Functions, activity limitations and participation restrictions that may impact the plan of care [] LOW: addressing 1-2 elements  [x] MODERATE: 3+ elements  [] HIGH: 4+ elements (please support below)    Moderate / High Support Documentation: altered posture, poor knowledge of body mechanics and posture, poor trunk stability, and impairments in strength, endurance, mobility, functional movement     Clinical Presentation [x] LOW: stable  [] MODERATE: Evolving  [] HIGH: Unstable     Decision Making/ Complexity Score: low        Goals:  Short Term Goals:  4 weeks  HEP: Patient will demonstrate 50% independence with HEP   Pain: Pt will demonstrate improved pain less than or equal to 4/10   Mobility: Patient will improve active lumbar range of motion to within normal limits without pain.     Long  Term Goals:  8 weeks  HEP: Pt will be independent with advanced HEP.  Pain: Pt will demonstrate improved pain less than or equal to 2/10 in order to progress toward maximal functional ability and improve QOL.  Function: Patient will decrease functional limitation score to less than or equal to 15 out of 100 on FOTO to improve functional independence and quality of life.   Mobility: Patient will demonstrate ability to walking 1/4 of a mile without an increase in lower back pain to return to prior level of function.   Strength: Patient will improve impaired strength to greater or equal to 4+/5 in order to perform a squat without difficulty or pain.        PLAN     Plan of care Certification: 9/27/2024 to 11/22/2024.    Outpatient Physical Therapy 1-2 time(s) every week(s) for 8 weeks to include the following interventions: Cervical/Lumbar Traction, Electrical Stimulation TENS/IFC, Gait Training, Manual Therapy, Moist Heat/ Ice, Neuromuscular Re-ed, Patient Education, Self Care, Therapeutic Activities, Therapeutic Exercise, and ASTYM, and FDN .     Binu Patel, PT      I CERTIFY THE NEED FOR THESE SERVICES FURNISHED UNDER THIS PLAN OF TREATMENT AND WHILE UNDER MY CARE   Physician's comments:     Physician's Signature: ___________________________________________________

## 2024-09-30 ENCOUNTER — CLINICAL SUPPORT (OUTPATIENT)
Dept: REHABILITATION | Facility: HOSPITAL | Age: 29
End: 2024-09-30
Payer: MEDICAID

## 2024-09-30 DIAGNOSIS — M53.3 SI (SACROILIAC) JOINT DYSFUNCTION: Primary | ICD-10-CM

## 2024-09-30 DIAGNOSIS — M62.81 WEAKNESS OF TRUNK MUSCULATURE: ICD-10-CM

## 2024-09-30 PROCEDURE — 97110 THERAPEUTIC EXERCISES: CPT | Mod: PN,CQ

## 2024-09-30 NOTE — PROGRESS NOTES
Pelvic Health Physical Therapy   Treatment Note     Name: Kristina Khoury  Clinic Number: [id number]    Therapy Diagnosis:   Encounter Diagnoses   Name Primary?    SI (sacroiliac) joint dysfunction Yes    Weakness of trunk musculature      Physician: Abril Hale CNM    Visit Date: 9/30/2024      Physician Orders: PT Eval and Treat   Medical Diagnosis from Referral: Bilateral hip pain [M25.551, M25.552], Vaginal pain [R10.2]   Evaluation Date: 9/27/2024  Authorization Period Expiration: 12/31/2024  Plan of Care Expiration: 11/22/2024  Progress Note Due: 10/27/2024  Visit # / Visits authorized: 1/ 20 + eval   FOTO: Issued Visit #: 1 /3     Precautions: Standard     Time In: 10:35  Time Out: 11:30  Total Appointment Time (timed & untimed codes): 55 minutes    Cancelled Visits: 0  No Show Visits: 0    Subjective     Pt reports: exercises have been going well. Sciatic nerve pain has been improving. Transferring in bed has been difficult.     She was compliant with home exercise program.  Response to previous treatment: no adverse symptoms  Functional change: last visit was initial evaluation    Pain: 6/10  Location: bilateral hips      Objective     April received therapeutic exercises to develop  strength, endurance, ROM, flexibility, posture, and core stabilization for 55 minutes including:     Leaning hip extension 2x10 LLE only   Sit to stand holding daughter with exhale x5 reps  Hip thrusters 2x10  BKFO 3x10  Seated adductor isometric  Seated abduction isometric    Reviewed HEP:  TA contraction   Posterior pelvic tilts deferred due to pain  Seated sciatic nerve glides     April received the following manual therapy techniques: to develop  ---  for --- minutes including:   ---    Therapeutic Activity to improve dynamic functional performance for 0 minutes including:     Education on benefits of SI joint belt     PATIENT EDUCATION AND HOME EXERCISES      Education provided:   general anatomy/physiology of  urinary/ bowel  system and benefits of treatment were discussed with the patient. Additionally, anatomy/physiology of pelvic floor, posture/body mechanices, diaphragmatic breathing, isometric abdominal exercises, and SI joint belt were reviewed.      Written Home Exercises provided: yes.  Exercises were reviewed and April was able to demonstrate them prior to the end of the session. April demonstrated good  understanding of the education provided. See EMR under Patient Instructions for exercises provided during therapy sessions.    Assessment     Patient presents to therapy with pain reported when transitioning with bed mobility. Discussed keeping knees together with transfers which haven't changed pain. She participated in functional sit to stands holding baby with focus on deep core and pelvic floor activation through exhale. Patient has good tolerance to hip and core stabilization exercises with notable weakness in hips. Performed leaning hip extension exercise with LLE only due to opposite SI joint pain when kicking right leg backwards. Hip thrusters performed with less pain than pelvic tilts. She will continue HEP provided on evaluation and work on TA activation during functional activities with daughter. Will progress as tolerated and update HEP next visit.    April Is progressing well towards her goals.   Pt prognosis is Good.     Pt will continue to benefit from skilled outpatient physical therapy to address the deficits listed in the problem list box on initial evaluation, provide pt/family education and to maximize pt's level of independence in the home and community environment.     Pt's spiritual, cultural and educational needs considered and pt agreeable to plan of care and goals.     Anticipated barriers to physical therapy: none    Goals:  Short Term Goals:  4 weeks  HEP: Patient will demonstrate 50% independence with HEP   Pain: Pt will demonstrate improved pain less than or equal to 4/10   Mobility:  Patient will improve active lumbar range of motion to within normal limits without pain.      Long Term Goals:  8 weeks  HEP: Pt will be independent with advanced HEP.  Pain: Pt will demonstrate improved pain less than or equal to 2/10 in order to progress toward maximal functional ability and improve QOL.  Function: Patient will decrease functional limitation score to less than or equal to 15 out of 100 on FOTO to improve functional independence and quality of life.   Mobility: Patient will demonstrate ability to walking 1/4 of a mile without an increase in lower back pain to return to prior level of function.   Strength: Patient will improve impaired strength to greater or equal to 4+/5 in order to perform a squat without difficulty or pain.           PLAN      Plan of care Certification: 9/27/2024 to 11/22/2024.     Outpatient Physical Therapy 1-2 time(s) every week(s) for 8 weeks to include the following interventions: Cervical/Lumbar Traction, Electrical Stimulation TENS/IFC, Gait Training, Manual Therapy, Moist Heat/ Ice, Neuromuscular Re-ed, Patient Education, Self Care, Therapeutic Activities, Therapeutic Exercise, and ASTYM, and FDN.     Jeanne Echeverria, PTA

## 2024-10-04 ENCOUNTER — DOCUMENTATION ONLY (OUTPATIENT)
Dept: REHABILITATION | Facility: HOSPITAL | Age: 29
End: 2024-10-04
Payer: MEDICAID

## 2024-10-04 NOTE — PROGRESS NOTES
PT/PTA met face to face to discuss pt's treatment plan and progress towards established goals. Pt will be seen by a physical therapist minimally every 6th visit or every 30 days.    Jeanne Echeverria PTA

## 2024-10-08 ENCOUNTER — DOCUMENTATION ONLY (OUTPATIENT)
Dept: REHABILITATION | Facility: HOSPITAL | Age: 29
End: 2024-10-08
Payer: MEDICAID